# Patient Record
Sex: MALE | Race: WHITE | NOT HISPANIC OR LATINO | Employment: FULL TIME | URBAN - NONMETROPOLITAN AREA
[De-identification: names, ages, dates, MRNs, and addresses within clinical notes are randomized per-mention and may not be internally consistent; named-entity substitution may affect disease eponyms.]

---

## 2017-01-03 ENCOUNTER — OFFICE VISIT (OUTPATIENT)
Dept: INTERNAL MEDICINE | Facility: CLINIC | Age: 27
End: 2017-01-03

## 2017-01-03 VITALS
SYSTOLIC BLOOD PRESSURE: 122 MMHG | RESPIRATION RATE: 12 BRPM | HEIGHT: 66 IN | WEIGHT: 175 LBS | DIASTOLIC BLOOD PRESSURE: 80 MMHG | HEART RATE: 94 BPM | OXYGEN SATURATION: 97 % | BODY MASS INDEX: 28.12 KG/M2

## 2017-01-03 DIAGNOSIS — N45.1 EPIDIDYMITIS, RIGHT: ICD-10-CM

## 2017-01-03 DIAGNOSIS — F98.8 ATTENTION DEFICIT DISORDER: ICD-10-CM

## 2017-01-03 DIAGNOSIS — N50.819 EPIDIDYMAL PAIN: Primary | ICD-10-CM

## 2017-01-03 PROCEDURE — 99214 OFFICE O/P EST MOD 30 MIN: CPT | Performed by: FAMILY MEDICINE

## 2017-01-03 NOTE — MR AVS SNAPSHOT
Colin CISNEROS Bryon   1/3/2017 4:15 PM   Office Visit    Provider:  Lauren Gold MD   Department:  [facility] PRIMARY CARE   Dept Phone:  [phone]                Your Full Care Plan              Today's Medication Changes          These changes are accurate as of: 1/3/17  5:43 PM.  If you have any questions, ask your nurse or doctor.               Medication(s)that have changed:     lisdexamfetamine 40 MG capsule   Commonly known as:  VYVANSE   Take 1 capsule by mouth Every Morning.   What changed:    - medication strength  - how much to take   Changed by:  Lauren Gold MD            Where to Get Your Medications      You can get these medications from any pharmacy     Bring a paper prescription for each of these medications     lisdexamfetamine 40 MG capsule                  Your Updated Medication List          This list is accurate as of: 1/3/17  5:43 PM.  Always use your most recent med list.                lisdexamfetamine 40 MG capsule   Commonly known as:  VYVANSE   Take 1 capsule by mouth Every Morning.               We Performed the Following     Ambulatory Referral to Urology       You Were Diagnosed With        Codes Comments    Epididymal pain    -  Primary ICD-10-CM: N50.819  ICD-9-CM: 608.9     Epididymitis, right     ICD-10-CM: N45.1  ICD-9-CM: 604.90     Attention deficit disorder     ICD-10-CM: F98.8  ICD-9-CM: 314.00       Instructions     None    Patient Instructions History      [employer] Signup     Our records indicate that you have an active Intrinsity account.    You can view your After Visit Summary by going to KnowFu and logging in with your [employer] username and password.  If you don't have a [employer] username and password but a parent or guardian has access to your record, the parent or guardian should login with their own [employer] username and password and access your record to view the After Visit Summary.    If you  "have questions, you can email [email or web address] or call [phone] to talk to our MyChart staff.  Remember, [employer] is NOT to be used for urgent needs.  For medical emergencies, dial 911.               Other Info from Your Visit           Allergies     No Known Allergies      Reason for Visit     Med Management     epididymitis           Vital Signs     Blood Pressure Pulse Respirations Height Weight Oxygen Saturation    122/80 94 12 66\" (167.6 cm) 175 lb (79.4 kg) 97%    Body Mass Index Smoking Status                28.25 kg/m2 Never Smoker          Problems and Diagnoses Noted     ADD (attention deficit disorder)    Disorder of male genital organs    -  Primary    Epididymitis, right          "

## 2017-01-03 NOTE — PROGRESS NOTES
SUBJECTIVE: Colin Slater is a 26 y.o. male seen for a follow up visit;        Follow up visit. Went to [facility] in the summer for epididymitis. Took antibx, it got better, flared up again about 2 weeks after finishing antibx. 2nd episode was not as bad as the first, took another round of antibx, resolved. Since then, occas will have soreness of right testicle that lasts a few days, no swelling. Will take ibuprofen and it helps. He used to skateboard, had multiple injuries to the groin. Exercise makes the soreness better. He does not have any masses or lumps, just feels uncomfortable, doesn't feel right.     ADD: Discuss lowering Vyvanse dosage. Thinks the tension headaches he had been describing may be motion sickness, not room spinning dizziness - more vertigo feeling.  The medicine is working for the most part but he is having some trouble sleeping - has weird work schedule that sometimes interferes with his medication dose and his sleep.     The following portions of the patient's history were reviewed and updated as appropriate: He  has a past medical history of Asthma.  He has Attention deficit disorder on his pertinent problem list.  He  has a past surgical history that includes Myringotomy w/ tubes; Septoplasty; and [place] tooth extraction.  His family history includes Cancer in his paternal grandmother; Diabetes in his father; Early death in his maternal grandfather; Heart disease in his father and paternal grandmother; Hyperlipidemia in his father and paternal grandmother; Hypertension in his father; Lung cancer in his paternal grandmother; No Known Problems in his paternal grandfather; Thyroid disease in his sister.  He  reports that he has never smoked. He has never used smokeless tobacco. He reports that he drinks about 7.2 oz of alcohol per week  He reports that he does not use illicit drugs..    Review of Systems   Constitutional: Negative.    Genitourinary: Positive for testicular  "pain. Negative for decreased urine volume, discharge, genital sores, hematuria, penile pain, penile swelling, scrotal swelling and urgency.         OBJECTIVE:  Visit Vitals   • /80   • Pulse 94   • Resp 12   • Ht 66\" (167.6 cm)   • Wt 175 lb (79.4 kg)   • SpO2 97%   • BMI 28.25 kg/m2        Physical Exam   Constitutional: He appears well-developed and well-nourished. No distress.   Psychiatric: His speech is normal and behavior is normal. Thought content normal. His mood appears anxious.           ASSESSMENT:   Diagnosis Plan   1. Epididymal pain  Ambulatory Referral to Urology   2. Epididymitis, right  Ambulatory Referral to Urology   3. Attention deficit disorder  lisdexamfetamine (VYVANSE) 40 MG capsule                 "

## 2017-01-12 ENCOUNTER — TELEPHONE (OUTPATIENT)
Dept: INTERNAL MEDICINE | Facility: CLINIC | Age: 27
End: 2017-01-12

## 2017-01-12 NOTE — TELEPHONE ENCOUNTER
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 States that if possible, he would need this by 1pm tomorrow. Patient would like to pick this up, if approved. Any other questions or concerns, please contact patient. Thank you.

## 2017-01-12 NOTE — TELEPHONE ENCOUNTER
Spoke with pt. He just needs a short letter as supporting documentation that he is starting new meds, and there may be dose changes until a stable dose is reached. Because of new med and dosage changes, it could affect him academically. He would like a call back when letter is ready.     He is also working with vocational rehab at [facility], they need copies of office notes to all the visits relating to ADD and meds. I advised him to talk to the  about getting those records. He will do this.

## 2017-01-20 ENCOUNTER — OFFICE VISIT (OUTPATIENT)
Dept: UROLOGY | Facility: CLINIC | Age: 27
End: 2017-01-20

## 2017-01-20 VITALS
OXYGEN SATURATION: 99 % | WEIGHT: 175 LBS | BODY MASS INDEX: 28.12 KG/M2 | TEMPERATURE: 97.9 F | HEART RATE: 95 BPM | HEIGHT: 66 IN | DIASTOLIC BLOOD PRESSURE: 80 MMHG | SYSTOLIC BLOOD PRESSURE: 125 MMHG

## 2017-01-20 DIAGNOSIS — N45.1 EPIDIDYMITIS: Primary | ICD-10-CM

## 2017-01-20 LAB
BILIRUB BLD-MCNC: NEGATIVE MG/DL
CLARITY, POC: CLEAR
COLOR UR: YELLOW
GLUCOSE UR STRIP-MCNC: NEGATIVE MG/DL
KETONES UR QL: NEGATIVE
LEUKOCYTE EST, POC: NEGATIVE
NITRITE UR-MCNC: NEGATIVE MG/ML
PH UR: 6 [PH] (ref 5–8)
PROT UR STRIP-MCNC: ABNORMAL MG/DL
RBC # UR STRIP: NEGATIVE /UL
SP GR UR: 1.02 (ref 1–1.03)
UROBILINOGEN UR QL: NORMAL

## 2017-01-20 PROCEDURE — 81003 URINALYSIS AUTO W/O SCOPE: CPT | Performed by: UROLOGY

## 2017-01-20 PROCEDURE — 99204 OFFICE O/P NEW MOD 45 MIN: CPT | Performed by: UROLOGY

## 2017-01-20 NOTE — PROGRESS NOTES
Chief Complaint  Consult ( SENT PATIENT TO BE SEEN FOR EPIDIDYMITITS.)        MAI Slater is a 26 y.o. male who is referred for evaluation of his testicles.  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  He was also treated in July for epididymitis thought secondary to a sexually transmitted disease since he had had unprotected intercourse and a slight urethral discharge.  He is in the [employer] and states they have checked him for STDs subsequently.  Early he continues to have a minor nagging pain in the right testicle that he is concerned about.  He has numerous other questions such as side effects on his libido and erectile function from medicine being used to treat his attention deficit disorder.  He is concerned about his fertility as well as the possibility of testicular torsion.  He's informed about the possibility of sterile reflux epididymitis from sudden increase in his abdominal pressure associated with a full bladder.  He denies any voiding difficulties and describes an AUA symptom index today of only 4.    Vitals:    01/20/17 1428   BP: 125/80   Pulse: 95   Temp: 97.9 °F (36.6 °C)   SpO2: 99%       Past Medical History  Past Medical History   Diagnosis Date   • Asthma        Past Surgical History  Past Surgical History   Procedure Laterality Date   • Myringotomy w/ tubes     • Septoplasty     • [place] tooth extraction         Medications  has a current medication list which includes the following prescription(s): lisdexamfetamine.      Allergies  No Known Allergies    Social History  Social History     Social History Narrative       Family History  He has no family history of bladder or kidney cancer  He has no family history of kidney stones      AUA Symptom Score:      Review of Systems  Review of Systems  Positive for fatigue and dizziness appetite changes but neg in other categories.  Physical Exam  Physical Exam   Constitutional: He is  oriented to person, place, and time. He appears well-developed and well-nourished.   HENT:   Head: Normocephalic and atraumatic.   Neck: Normal range of motion.   Pulmonary/Chest: Effort normal. No respiratory distress.   Abdominal: Soft. He exhibits no mass. There is no tenderness. No hernia.   Genitourinary: Rectum normal, prostate normal and penis normal.   Musculoskeletal: Normal range of motion.   Neurological: He is alert and oriented to person, place, and time.   Skin: Skin is warm and dry.   Psychiatric: He has a normal mood and affect. His behavior is normal.   Vitals reviewed.      Labs Recent and today in the office:  Results for orders placed or performed in visit on 01/20/17   POC Urinalysis Dipstick, Automated   Result Value Ref Range    Color Yellow Yellow, Straw, Dark Yellow, Dinah    Clarity, UA Clear Clear    Glucose, UA Negative Negative, 1000 mg/dL (3+) mg/dL    Bilirubin Negative Negative    Ketones, UA Negative Negative    Specific Gravity  1.025 1.005 - 1.030    Blood, UA Negative Negative    pH, Urine 6.0 5.0 - 8.0    Protein, POC 1+ (A) Negative mg/dL    Urobilinogen, UA Normal Normal    Leukocytes Negative Negative    Nitrite, UA Negative Negative         Assessment & Plan  His urine today is clear and negative for both white cells and blood.  His physical exam is normal including no masses or tenderness in the Testicles or epididymis on either side.  The globus minor of the right epididymis is identified as the source of his discomfort but it is objectively normal today.    A vigorous massage of the prostate produces no prosthetic secretions that I had hoped to examine under the microscope.    In short there is no evidence of pathology today but I have recommended a scrotal ultrasound for further evaluation.  Anytime he has a swelling or other pathology that are objectively present of recommend he return immediately for repeat exam and it 6 weeks regardless.  He's instructed in ways to  avoid sterile reflux epididymitis and obviously needs to avoid unprotected intercourse with new sexual contacts.  He is concerned about infertility and is informed there is a slightly increased risk with bouts of epididymitis.    I note he is on Vyvanse which should be used with caution with patient's with a history of substance abuse.

## 2017-01-20 NOTE — MR AVS SNAPSHOT
Colin CISNEROS Bryon   1/20/2017 2:30 PM   Office Visit    Dept Phone:  [phone]   Encounter #:  [id number]    Provider:  Jasiel Husain MD   Department:  [facility] UROLOGY                Your Full Care Plan              Your Updated Medication List          This list is accurate as of: 1/20/17  2:49 PM.  Always use your most recent med list.                lisdexamfetamine 40 MG capsule   Commonly known as:  VYVANSE   Take 1 capsule by mouth Every Morning.               We Performed the Following     POC Urinalysis Dipstick, Automated       You Were Diagnosed With        Codes Comments    Epididymitis    -  Primary ICD-10-CM: N45.1  ICD-9-CM: 604.90       Instructions     None    Patient Instructions History      Upcoming Appointments     Visit Type Date Time Department    NEW PATIENT 1/20/2017  2:30 PM [facility] UROLOGY [place]    FOLLOW UP 3/6/2017  3:15 PM E UROLOGY [place]    FOLLOW UP 4/3/2017  3:45 PM E [facility]      WigWag Signup     Our records indicate that you have an active [employer] account.    You can view your After Visit Summary by going to [employer] and logging in with your [employer] username and password.  If you don't have a W[employer] username and password but a parent or guardian has access to your record, the parent or guardian should login with their own W[employer] username and password and access your record to view the After Visit Summary.    If you have questions, you can email [email or web address] or call [phone] to talk to our WigW[employer] staff.  Remember, W[employer] is NOT to be used for urgent needs.  For medical emergencies, dial 911.               Other Info from Your Visit           Your Appointments     Mar 06, 2017  3:15 PM EST   Follow Up with Jasiel Husain MD   [facility] UROLOGY (--)    [address] [address] [phone]           Arrive 15 minutes  "prior to appointment.            Apr 03, 2017  3:45 PM EDT   Follow Up with Lauren Gold MD   [facility] PRIMARY CARE (--)    [address]  [facility] [address] [phone]           Arrive 15 minutes prior to appointment.              Allergies     No Known Allergies      Reason for Visit     Consult  SENT PATIENT TO BE SEEN FOR EPIDIDYMITITS.      Vital Signs     Blood Pressure Pulse Temperature Height Weight Oxygen Saturation    125/80 95 97.9 °F (36.6 °C) (Temporal Artery ) 66\" (167.6 cm) 175 lb (79.4 kg) 99%    Body Mass Index Smoking Status                28.25 kg/m2 Never Smoker          Problems and Diagnoses Noted     Epididymitis    -  Primary      Results     POC Urinalysis Dipstick, Automated      Component Value Standard Range & Units    Color Yellow Yellow, Straw, Dark Yellow, Dinah    Clarity, UA Clear Clear    Glucose, UA Negative Negative, 1000 mg/dL (3+) mg/dL    Bilirubin Negative Negative    Ketones, UA Negative Negative    Specific Gravity  1.025 1.005 - 1.030    Blood, UA Negative Negative    pH, Urine 6.0 5.0 - 8.0    Protein, POC 1+ Negative mg/dL    Urobilinogen, UA Normal Normal    Leukocytes Negative Negative    Nitrite, UA Negative Negative                    "

## 2017-01-25 ENCOUNTER — HOSPITAL ENCOUNTER (OUTPATIENT)
Dept: ULTRASOUND IMAGING | Facility: HOSPITAL | Age: 27
Discharge: HOME | End: 2017-01-25
Attending: UROLOGY

## 2017-01-27 ENCOUNTER — HOSPITAL ENCOUNTER (OUTPATIENT)
Dept: ULTRASOUND IMAGING | Facility: HOSPITAL | Age: 27
Discharge: HOME | End: 2017-01-27
Attending: UROLOGY | Admitting: UROLOGY

## 2017-01-27 PROCEDURE — 76870 US EXAM SCROTUM: CPT

## 2017-02-03 DIAGNOSIS — F98.8 ATTENTION DEFICIT DISORDER: ICD-10-CM

## 2017-02-03 NOTE — TELEPHONE ENCOUNTER
----- Message from Tanya Edge sent at 2/3/2017  9:24 AM EST -----  THE PATIENT NEEDS A REFILL OF VYVANSE. HE CAN BE REACHED [phone].

## 2017-03-09 DIAGNOSIS — F98.8 ATTENTION DEFICIT DISORDER: ICD-10-CM

## 2017-03-27 ENCOUNTER — OFFICE VISIT (OUTPATIENT)
Dept: INTERNAL MEDICINE | Facility: CLINIC | Age: 27
End: 2017-03-27

## 2017-03-27 VITALS
DIASTOLIC BLOOD PRESSURE: 80 MMHG | BODY MASS INDEX: 30.37 KG/M2 | OXYGEN SATURATION: 98 % | SYSTOLIC BLOOD PRESSURE: 132 MMHG | HEART RATE: 91 BPM | HEIGHT: 66 IN | WEIGHT: 189 LBS | RESPIRATION RATE: 12 BRPM

## 2017-03-27 DIAGNOSIS — F32.1 MODERATE SINGLE CURRENT EPISODE OF MAJOR DEPRESSIVE DISORDER (HCC): ICD-10-CM

## 2017-03-27 DIAGNOSIS — F98.8 ATTENTION DEFICIT DISORDER: Primary | ICD-10-CM

## 2017-03-27 PROCEDURE — 99214 OFFICE O/P EST MOD 30 MIN: CPT | Performed by: FAMILY MEDICINE

## 2017-03-27 RX ORDER — DEXTROAMPHETAMINE SACCHARATE, AMPHETAMINE ASPARTATE MONOHYDRATE, DEXTROAMPHETAMINE SULFATE AND AMPHETAMINE SULFATE 6.25; 6.25; 6.25; 6.25 MG/1; MG/1; MG/1; MG/1
25 CAPSULE, EXTENDED RELEASE ORAL EVERY MORNING
Qty: 30 CAPSULE | Refills: 0 | Status: SHIPPED | OUTPATIENT
Start: 2017-03-27 | End: 2017-03-27 | Stop reason: SDUPTHER

## 2017-03-27 RX ORDER — DEXTROAMPHETAMINE SACCHARATE, AMPHETAMINE ASPARTATE MONOHYDRATE, DEXTROAMPHETAMINE SULFATE AND AMPHETAMINE SULFATE 6.25; 6.25; 6.25; 6.25 MG/1; MG/1; MG/1; MG/1
25 CAPSULE, EXTENDED RELEASE ORAL EVERY MORNING
Qty: 30 CAPSULE | Refills: 0 | Status: SHIPPED | OUTPATIENT
Start: 2017-03-27 | End: 2017-04-03 | Stop reason: SDUPTHER

## 2017-03-27 RX ORDER — ESCITALOPRAM OXALATE 5 MG/1
5 TABLET ORAL DAILY
Qty: 30 TABLET | Refills: 1 | Status: SHIPPED | OUTPATIENT
Start: 2017-03-27 | End: 2017-04-13

## 2017-03-31 ENCOUNTER — TELEPHONE (OUTPATIENT)
Dept: INTERNAL MEDICINE | Facility: CLINIC | Age: 27
End: 2017-03-31

## 2017-03-31 DIAGNOSIS — F98.8 ATTENTION DEFICIT DISORDER: ICD-10-CM

## 2017-03-31 NOTE — TELEPHONE ENCOUNTER
Patient states he thinks the Adderall is working fine but is causing him to be a little jittery. Would like to know if the dose could be lowered a little or if this is something that could be lowered quite a bit and taken twice a day.

## 2017-03-31 NOTE — TELEPHONE ENCOUNTER
----- Message from Magdalena Rodriguez sent at 3/31/2017  1:28 PM EDT -----  PLEASE CALL PATIENT HE HAS SOME ISSUES WITH HIS MEDICATION AND WOULD LIKE TO DECREASE THE DOSE.     [phone]

## 2017-04-03 RX ORDER — DEXTROAMPHETAMINE SACCHARATE, AMPHETAMINE ASPARTATE MONOHYDRATE, DEXTROAMPHETAMINE SULFATE AND AMPHETAMINE SULFATE 3.75; 3.75; 3.75; 3.75 MG/1; MG/1; MG/1; MG/1
14 CAPSULE, EXTENDED RELEASE ORAL EVERY MORNING
Qty: 14 CAPSULE | Refills: 0 | Status: SHIPPED | OUTPATIENT
Start: 2017-04-03 | End: 2017-04-24 | Stop reason: DRUGHIGH

## 2017-04-03 RX ORDER — DEXTROAMPHETAMINE SACCHARATE, AMPHETAMINE ASPARTATE MONOHYDRATE, DEXTROAMPHETAMINE SULFATE AND AMPHETAMINE SULFATE 5; 5; 5; 5 MG/1; MG/1; MG/1; MG/1
20 CAPSULE, EXTENDED RELEASE ORAL EVERY MORNING
Qty: 14 CAPSULE | Refills: 0 | Status: SHIPPED | OUTPATIENT
Start: 2017-04-03 | End: 2017-04-03 | Stop reason: SDUPTHER

## 2017-04-12 ENCOUNTER — OFFICE VISIT (OUTPATIENT)
Dept: INTERNAL MEDICINE | Facility: CLINIC | Age: 27
End: 2017-04-12

## 2017-04-12 VITALS
TEMPERATURE: 98.1 F | BODY MASS INDEX: 29.83 KG/M2 | HEIGHT: 66 IN | OXYGEN SATURATION: 97 % | SYSTOLIC BLOOD PRESSURE: 134 MMHG | HEART RATE: 77 BPM | DIASTOLIC BLOOD PRESSURE: 82 MMHG | WEIGHT: 185.6 LBS

## 2017-04-12 DIAGNOSIS — F98.8 ATTENTION DEFICIT DISORDER: ICD-10-CM

## 2017-04-12 PROCEDURE — 99213 OFFICE O/P EST LOW 20 MIN: CPT | Performed by: FAMILY MEDICINE

## 2017-04-12 RX ORDER — VENLAFAXINE HYDROCHLORIDE 37.5 MG/1
37.5 CAPSULE, EXTENDED RELEASE ORAL DAILY
Qty: 30 CAPSULE | Refills: 0 | Status: SHIPPED | OUTPATIENT
Start: 2017-04-12 | End: 2017-04-24 | Stop reason: SDUPTHER

## 2017-04-12 RX ORDER — DEXTROAMPHETAMINE SACCHARATE, AMPHETAMINE ASPARTATE MONOHYDRATE, DEXTROAMPHETAMINE SULFATE AND AMPHETAMINE SULFATE 5; 5; 5; 5 MG/1; MG/1; MG/1; MG/1
20 CAPSULE, EXTENDED RELEASE ORAL EVERY MORNING
Qty: 14 CAPSULE | Refills: 0 | Status: SHIPPED | OUTPATIENT
Start: 2017-04-12 | End: 2017-04-24 | Stop reason: SDUPTHER

## 2017-04-13 NOTE — PROGRESS NOTES
Subjective   Colin Slater is a 27 y.o. male.     History of Present Illness     Patient normally sees Dr. Gold for his anxiety and ADHD.  They have been working together on medicines for ADHD.  Recently his Adderall XR dose was decreased from 25 mg to 15 mg which he thinks this may been too big of a drop.  Having more fatigue.  Mood is also still down, does not have interest in doing activities.  Anhedonia.  No suicidal thoughts.  Has been on Lexapro for about 3 weeks, has not been on other medications.  For his ADHD, he has failed Vyvanse and Ritalin.  He has been researching medicines for depression and anxiety and he asked if a norepinephrine modulator would be an option.      Review of Systems   Constitutional: Positive for fatigue.   Cardiovascular: Negative for chest pain.   Psychiatric/Behavioral: Positive for decreased concentration and sleep disturbance. Negative for suicidal ideas. The patient is nervous/anxious.        Objective   Physical Exam   Constitutional: He is oriented to person, place, and time. Vital signs are normal. He appears well-developed and well-nourished. He is active. He does not appear ill.   HENT:   Head: Normocephalic and atraumatic.   Right Ear: Hearing normal.   Left Ear: Hearing normal.   Nose: Nose normal.   Eyes: EOM and lids are normal. Pupils are equal, round, and reactive to light.   Cardiovascular: Normal rate, regular rhythm and normal heart sounds.    Pulmonary/Chest: Effort normal and breath sounds normal.   Neurological: He is alert and oriented to person, place, and time. No cranial nerve deficit. Gait normal.   Skin: Skin is warm. He is not diaphoretic. No cyanosis. Nails show no clubbing.   Psychiatric: He has a normal mood and affect. His behavior is normal. Judgment and thought content normal. His speech is not delayed and not tangential.   Pressured speech but not rapid He is attentive.   Nursing note and vitals reviewed.      Assessment/Plan   Colin was seen  today for adhd and depression.    Diagnoses and all orders for this visit:    Attention deficit disorder  -     amphetamine-dextroamphetamine XR (ADDERALL XR) 20 MG 24 hr capsule; Take 1 capsule by mouth Every Morning.    Other orders  -     venlafaxine XR (EFFEXOR XR) 37.5 MG 24 hr capsule; Take 1 capsule by mouth Daily.     stop Lexapro, start Effexor.  I agree to increase his Adderall dose back up a bit.  He will follow-up with his primary care provider within 2 weeks to discuss how he's doing.  Stop Effexor for any thoughts of suicide or homicide, call office.

## 2017-04-24 ENCOUNTER — OFFICE VISIT (OUTPATIENT)
Dept: INTERNAL MEDICINE | Facility: CLINIC | Age: 27
End: 2017-04-24

## 2017-04-24 VITALS
DIASTOLIC BLOOD PRESSURE: 80 MMHG | HEIGHT: 66 IN | RESPIRATION RATE: 12 BRPM | HEART RATE: 80 BPM | SYSTOLIC BLOOD PRESSURE: 122 MMHG | WEIGHT: 186 LBS | BODY MASS INDEX: 29.89 KG/M2

## 2017-04-24 DIAGNOSIS — F98.8 ATTENTION DEFICIT DISORDER: ICD-10-CM

## 2017-04-24 DIAGNOSIS — F32.1 MODERATE SINGLE CURRENT EPISODE OF MAJOR DEPRESSIVE DISORDER (HCC): Primary | ICD-10-CM

## 2017-04-24 PROCEDURE — 99213 OFFICE O/P EST LOW 20 MIN: CPT | Performed by: FAMILY MEDICINE

## 2017-04-24 RX ORDER — VENLAFAXINE HYDROCHLORIDE 75 MG/1
75 CAPSULE, EXTENDED RELEASE ORAL DAILY
Qty: 30 CAPSULE | Refills: 1 | Status: SHIPPED | OUTPATIENT
Start: 2017-04-24 | End: 2017-05-25 | Stop reason: SDUPTHER

## 2017-04-24 RX ORDER — DEXTROAMPHETAMINE SACCHARATE, AMPHETAMINE ASPARTATE MONOHYDRATE, DEXTROAMPHETAMINE SULFATE AND AMPHETAMINE SULFATE 5; 5; 5; 5 MG/1; MG/1; MG/1; MG/1
20 CAPSULE, EXTENDED RELEASE ORAL EVERY MORNING
Qty: 30 CAPSULE | Refills: 0 | Status: SHIPPED | OUTPATIENT
Start: 2017-04-24 | End: 2017-05-26 | Stop reason: SDUPTHER

## 2017-05-19 ENCOUNTER — TELEPHONE (OUTPATIENT)
Dept: INTERNAL MEDICINE | Facility: CLINIC | Age: 27
End: 2017-05-19

## 2017-05-24 DIAGNOSIS — F32.1 MODERATE SINGLE CURRENT EPISODE OF MAJOR DEPRESSIVE DISORDER (HCC): ICD-10-CM

## 2017-05-24 DIAGNOSIS — F98.8 ATTENTION DEFICIT DISORDER: ICD-10-CM

## 2017-05-24 RX ORDER — DEXTROAMPHETAMINE SACCHARATE, AMPHETAMINE ASPARTATE MONOHYDRATE, DEXTROAMPHETAMINE SULFATE AND AMPHETAMINE SULFATE 5; 5; 5; 5 MG/1; MG/1; MG/1; MG/1
20 CAPSULE, EXTENDED RELEASE ORAL EVERY MORNING
Qty: 30 CAPSULE | Refills: 0 | Status: CANCELLED | OUTPATIENT
Start: 2017-05-24

## 2017-05-25 ENCOUNTER — TELEPHONE (OUTPATIENT)
Dept: INTERNAL MEDICINE | Facility: CLINIC | Age: 27
End: 2017-05-25

## 2017-05-25 DIAGNOSIS — F32.1 MODERATE SINGLE CURRENT EPISODE OF MAJOR DEPRESSIVE DISORDER (HCC): ICD-10-CM

## 2017-05-25 RX ORDER — VENLAFAXINE HYDROCHLORIDE 75 MG/1
75 CAPSULE, EXTENDED RELEASE ORAL DAILY
Qty: 90 CAPSULE | Refills: 1 | Status: SHIPPED | OUTPATIENT
Start: 2017-05-25 | End: 2019-02-22

## 2017-05-26 DIAGNOSIS — F98.8 ATTENTION DEFICIT DISORDER: ICD-10-CM

## 2017-05-26 RX ORDER — DEXTROAMPHETAMINE SACCHARATE, AMPHETAMINE ASPARTATE MONOHYDRATE, DEXTROAMPHETAMINE SULFATE AND AMPHETAMINE SULFATE 5; 5; 5; 5 MG/1; MG/1; MG/1; MG/1
20 CAPSULE, EXTENDED RELEASE ORAL EVERY MORNING
Qty: 30 CAPSULE | Refills: 0 | Status: SHIPPED | OUTPATIENT
Start: 2017-05-26 | End: 2017-07-03 | Stop reason: SDUPTHER

## 2017-05-30 DIAGNOSIS — F98.8 ATTENTION DEFICIT DISORDER: ICD-10-CM

## 2017-05-30 RX ORDER — VENLAFAXINE HYDROCHLORIDE 75 MG/1
75 CAPSULE, EXTENDED RELEASE ORAL DAILY
Qty: 30 CAPSULE | Refills: 1 | OUTPATIENT
Start: 2017-05-30

## 2017-06-01 NOTE — TELEPHONE ENCOUNTER
Spoke with pt about Adderall. He has not filled the 20 mg XR that was written last week. He is requesting 25 mg XR because he has discovered when he takes the 25 mg along with the Venlafaxine, he can achieve orgasm. He will bring in 20 mg XR and give it to us.

## 2017-06-02 ENCOUNTER — TELEPHONE (OUTPATIENT)
Dept: INTERNAL MEDICINE | Facility: CLINIC | Age: 27
End: 2017-06-02

## 2017-06-02 DIAGNOSIS — F32.1 MODERATE SINGLE CURRENT EPISODE OF MAJOR DEPRESSIVE DISORDER (HCC): Primary | ICD-10-CM

## 2017-06-02 DIAGNOSIS — F98.8 ATTENTION DEFICIT DISORDER: ICD-10-CM

## 2017-06-02 NOTE — TELEPHONE ENCOUNTER
Patient called today regarding message he sent in my chart on 5/30, it would not let me addend the message. He would like a call back when possible.     He also went on to state that he apologized for being difficult, he knows that he can be hard to deal with when there is change involved. But that he has had consistent communication problems and if this persisted, he may look into changing to another office.

## 2017-06-02 NOTE — TELEPHONE ENCOUNTER
"On 5/19 patient requested a greater than 30 day prescription of his Schedule II medication adderal XR 20 mg.  He was informed via telephone by my MA that I had refused this request due to controlled prescription state law.  When he came to the office to  the prescription on 5/26 he refused to leave because his request was not agreed to.  During my 3:15 new patient office visit I was interrupted 4 different times due to his various demands and continued refusal to leave, despite my MA having already explained to him prior to his coming that I would not be making a \"special permission\" for more than 30 days of his controlled medication.  The  had to intercede because he would not accept my refusal.  He finally accepted the 30 day prescription and left.      Two days ago he sent a message stating that he had taken some leftover 25 mg dosage of the same medication and that because of a side effect of one medication and this pills ability to overcome that side effect (my phrasing - not his) he wanted to switch back to this past dose.  The past dose was changed at his request in April because it was making him \"too jittery\".     In all, his ADD medications have required medication dose adjustment or medication switches nine times in the past year, this would be the tenth.      I wrote Mr. Slater a letter that he will be referred to psychiatry for his depression and ADD as I do not think I am successfully treating his conditions.  I advised him I will continue to prescribe his current medications and doses with no further adjustments.    "

## 2017-06-05 RX ORDER — DEXTROAMPHETAMINE SACCHARATE, AMPHETAMINE ASPARTATE MONOHYDRATE, DEXTROAMPHETAMINE SULFATE AND AMPHETAMINE SULFATE 6.25; 6.25; 6.25; 6.25 MG/1; MG/1; MG/1; MG/1
25 CAPSULE, EXTENDED RELEASE ORAL EVERY MORNING
Qty: 30 CAPSULE | Refills: 0 | OUTPATIENT
Start: 2017-06-05

## 2017-06-05 NOTE — TELEPHONE ENCOUNTER
Advised that this is actually a concerning side effect of a dose that is too high, cannot increase dose.

## 2017-07-03 ENCOUNTER — TELEPHONE (OUTPATIENT)
Dept: INTERNAL MEDICINE | Facility: CLINIC | Age: 27
End: 2017-07-03

## 2017-07-03 DIAGNOSIS — F98.8 ATTENTION DEFICIT DISORDER: ICD-10-CM

## 2017-07-03 RX ORDER — DEXTROAMPHETAMINE SACCHARATE, AMPHETAMINE ASPARTATE MONOHYDRATE, DEXTROAMPHETAMINE SULFATE AND AMPHETAMINE SULFATE 5; 5; 5; 5 MG/1; MG/1; MG/1; MG/1
20 CAPSULE, EXTENDED RELEASE ORAL EVERY MORNING
Qty: 30 CAPSULE | Refills: 0 | Status: SHIPPED | OUTPATIENT
Start: 2017-07-03 | End: 2020-02-24

## 2017-07-05 ENCOUNTER — TELEPHONE (OUTPATIENT)
Dept: INTERNAL MEDICINE | Facility: CLINIC | Age: 27
End: 2017-07-05

## 2017-07-05 DIAGNOSIS — F98.8 ATTENTION DEFICIT DISORDER: ICD-10-CM

## 2017-07-05 RX ORDER — DEXTROAMPHETAMINE SACCHARATE, AMPHETAMINE ASPARTATE MONOHYDRATE, DEXTROAMPHETAMINE SULFATE AND AMPHETAMINE SULFATE 5; 5; 5; 5 MG/1; MG/1; MG/1; MG/1
20 CAPSULE, EXTENDED RELEASE ORAL EVERY MORNING
Qty: 30 CAPSULE | Refills: 0 | Status: CANCELLED | OUTPATIENT
Start: 2017-07-05

## 2017-07-05 NOTE — TELEPHONE ENCOUNTER
Pt called Monday 7-3-17 for refill. Rx was signed this morning by Dr. Gold. 3Jam message sent to pt this morning letting him know rx is ready to be picked up.

## 2018-01-16 ENCOUNTER — APPOINTMENT (OUTPATIENT)
Dept: ULTRASOUND IMAGING | Facility: HOSPITAL | Age: 28
End: 2018-01-16

## 2018-01-16 ENCOUNTER — HOSPITAL ENCOUNTER (EMERGENCY)
Facility: HOSPITAL | Age: 28
Discharge: HOME | End: 2018-01-16
Attending: EMERGENCY MEDICINE | Admitting: EMERGENCY MEDICINE

## 2018-01-16 VITALS
BODY MASS INDEX: 28.25 KG/M2 | RESPIRATION RATE: 20 BRPM | TEMPERATURE: 98.3 F | DIASTOLIC BLOOD PRESSURE: 91 MMHG | SYSTOLIC BLOOD PRESSURE: 141 MMHG | OXYGEN SATURATION: 98 % | HEART RATE: 88 BPM | WEIGHT: 180 LBS | HEIGHT: 67 IN

## 2018-01-16 DIAGNOSIS — N45.1 EPIDIDYMITIS, LEFT: Primary | ICD-10-CM

## 2018-01-16 LAB
BACTERIA UR QL AUTO: ABNORMAL /HPF
BILIRUB UR QL STRIP: NEGATIVE
CLARITY UR: ABNORMAL
COLOR UR: YELLOW
GLUCOSE UR STRIP-MCNC: NEGATIVE MG/DL
HGB UR QL STRIP.AUTO: ABNORMAL
HYALINE CASTS UR QL AUTO: ABNORMAL /LPF
KETONES UR QL STRIP: NEGATIVE
LEUKOCYTE ESTERASE UR QL STRIP.AUTO: ABNORMAL
NITRITE UR QL STRIP: NEGATIVE
PH UR STRIP.AUTO: 6 [PH] (ref 5–8)
PROT UR QL STRIP: ABNORMAL
RBC # UR: ABNORMAL /HPF
REF LAB TEST METHOD: ABNORMAL
SP GR UR STRIP: 1.02 (ref 1–1.03)
SQUAMOUS #/AREA URNS HPF: ABNORMAL /HPF
UROBILINOGEN UR QL STRIP: ABNORMAL
WBC UR QL AUTO: ABNORMAL /HPF

## 2018-01-16 PROCEDURE — 87086 URINE CULTURE/COLONY COUNT: CPT | Performed by: PHYSICIAN ASSISTANT

## 2018-01-16 PROCEDURE — 99283 EMERGENCY DEPT VISIT LOW MDM: CPT

## 2018-01-16 PROCEDURE — 96372 THER/PROPH/DIAG INJ SC/IM: CPT

## 2018-01-16 PROCEDURE — 76870 US EXAM SCROTUM: CPT

## 2018-01-16 PROCEDURE — 25010000002 CEFTRIAXONE PER 250 MG: Performed by: PHYSICIAN ASSISTANT

## 2018-01-16 PROCEDURE — 87591 N.GONORRHOEAE DNA AMP PROB: CPT | Performed by: PHYSICIAN ASSISTANT

## 2018-01-16 PROCEDURE — 81001 URINALYSIS AUTO W/SCOPE: CPT | Performed by: PHYSICIAN ASSISTANT

## 2018-01-16 PROCEDURE — 87491 CHLMYD TRACH DNA AMP PROBE: CPT | Performed by: PHYSICIAN ASSISTANT

## 2018-01-16 RX ORDER — DOXYCYCLINE HYCLATE 50 MG/1
100 CAPSULE ORAL ONCE
Status: DISCONTINUED | OUTPATIENT
Start: 2018-01-16 | End: 2018-01-16

## 2018-01-16 RX ORDER — LIDOCAINE HYDROCHLORIDE 10 MG/ML
2.1 INJECTION, SOLUTION EPIDURAL; INFILTRATION; INTRACAUDAL; PERINEURAL ONCE
Status: COMPLETED | OUTPATIENT
Start: 2018-01-16 | End: 2018-01-16

## 2018-01-16 RX ORDER — DOXYCYCLINE 100 MG/1
100 CAPSULE ORAL 2 TIMES DAILY
Qty: 28 CAPSULE | Refills: 0 | OUTPATIENT
Start: 2018-01-16 | End: 2018-12-15 | Stop reason: HOSPADM

## 2018-01-16 RX ORDER — NAPROXEN 500 MG/1
500 TABLET ORAL 2 TIMES DAILY PRN
Qty: 14 TABLET | Refills: 0 | OUTPATIENT
Start: 2018-01-16 | End: 2018-12-15 | Stop reason: HOSPADM

## 2018-01-16 RX ORDER — CEFTRIAXONE 1 G/1
1000 INJECTION, POWDER, FOR SOLUTION INTRAMUSCULAR; INTRAVENOUS ONCE
Status: COMPLETED | OUTPATIENT
Start: 2018-01-16 | End: 2018-01-16

## 2018-01-16 RX ORDER — DOXYCYCLINE 100 MG/1
100 CAPSULE ORAL ONCE
Status: COMPLETED | OUTPATIENT
Start: 2018-01-16 | End: 2018-01-16

## 2018-01-16 RX ORDER — NAPROXEN 500 MG/1
500 TABLET ORAL ONCE
Status: COMPLETED | OUTPATIENT
Start: 2018-01-16 | End: 2018-01-16

## 2018-01-16 RX ADMIN — CEFTRIAXONE SODIUM 1000 MG: 1 INJECTION, POWDER, FOR SOLUTION INTRAMUSCULAR; INTRAVENOUS at 22:37

## 2018-01-16 RX ADMIN — NAPROXEN 500 MG: 500 TABLET ORAL at 21:17

## 2018-01-16 RX ADMIN — DOXYCYCLINE 100 MG: 100 CAPSULE ORAL at 22:37

## 2018-01-16 RX ADMIN — LIDOCAINE HYDROCHLORIDE 2.1 ML: 10 INJECTION, SOLUTION EPIDURAL; INFILTRATION; INTRACAUDAL; PERINEURAL at 22:37

## 2018-01-17 NOTE — ED PROVIDER NOTES
Subjective   HPI Comments: 27-year-old male with history of recurrent epididymitis.  Had 2 cases in 2017.  He is here with progressively worsening moderate to severe achy type pain to his left testicle over the past week.  He also notices progressive swelling to the left testicle as well.  Also notices some tender lymph nodes in his left inguinal region.  No penile discharge or urinary complaints.  Some radiation of the pain to the left lower quadrant.  No fever, nausea or vomiting.  No history of inguinal hernias.    Aggravating factors: Palpation of the testicle.  Alleviating factors and treatment prior to arrival: Ibuprofen.  The patient states that he has seen Dr. Husain in the past.  No STD exposure.      History provided by:  Patient  History limited by: nothing.   used: No        Review of Systems   Constitutional: Negative.    HENT: Negative.    Eyes: Negative.    Respiratory: Negative.    Cardiovascular: Negative.  Negative for chest pain.   Gastrointestinal: Positive for abdominal pain.   Endocrine: Negative.    Genitourinary: Positive for scrotal swelling and testicular pain. Negative for dysuria.   Musculoskeletal: Negative.    Skin: Negative.    Allergic/Immunologic: Negative.    Neurological: Negative.    Hematological: Negative.    Psychiatric/Behavioral: Negative.    All other systems reviewed and are negative.      Past Medical History:   Diagnosis Date   • ADD (attention deficit disorder)    • ADHD (attention deficit hyperactivity disorder)    • Asthma        No Known Allergies    Past Surgical History:   Procedure Laterality Date   • EAR TUBES     • MYRINGOTOMY W/ TUBES     • NASAL SEPTUM SURGERY     • SEPTOPLASTY     • WISDOM TOOTH EXTRACTION     • WISDOM TOOTH EXTRACTION         Family History   Problem Relation Age of Onset   • Diabetes Father    • Heart disease Father    • Hyperlipidemia Father    • Hypertension Father    • Thyroid disease Sister    • Early death Maternal  Grandfather    • Lung cancer Paternal Grandmother    • Cancer Paternal Grandmother    • Heart disease Paternal Grandmother    • Hyperlipidemia Paternal Grandmother    • No Known Problems Paternal Grandfather        Social History     Social History   • Marital status: Single     Spouse name: N/A   • Number of children: N/A   • Years of education: N/A     Social History Main Topics   • Smoking status: Never Smoker   • Smokeless tobacco: Never Used   • Alcohol use 7.2 oz/week     6 Cans of beer, 6 Shots of liquor per week   • Drug use: No   • Sexual activity: Yes     Partners: Female     Other Topics Concern   • None     Social History Narrative           Objective   Physical Exam   Constitutional: He is oriented to person, place, and time. He appears well-developed and well-nourished. No distress.   HENT:   Head: Normocephalic and atraumatic.   Right Ear: External ear normal.   Left Ear: External ear normal.   Eyes: EOM are normal. Pupils are equal, round, and reactive to light.   Neck: Normal range of motion. Neck supple.   Cardiovascular: Normal rate, regular rhythm and normal heart sounds.    Pulmonary/Chest: Effort normal and breath sounds normal. No stridor. He has no wheezes. He exhibits no tenderness.   Abdominal: Soft. He exhibits no distension. There is no rebound and no guarding.   Minimal tenderness of the left lower quadrant   Genitourinary:   Genitourinary Comments: The left testicle is moderately swollen and tender to palpation.  The overlying skin does not appear to be infected.  Normal-appearing circumcised penis.  The left inguinal region has some tender, mildly swollen lymph nodes.  No inguinal hernias present.   Musculoskeletal: Normal range of motion. He exhibits no edema.   Neurological: He is alert and oriented to person, place, and time.   Skin: Skin is warm and dry. No rash noted. He is not diaphoretic.   Psychiatric: He has a normal mood and affect. His behavior is normal. Judgment and  thought content normal.   Nursing note and vitals reviewed.      Procedures         ED Course  ED Course                  MDM  Number of Diagnoses or Management Options  Epididymitis, left: new and requires workup     Amount and/or Complexity of Data Reviewed  Clinical lab tests: ordered and reviewed  Tests in the radiology section of CPT®: ordered and reviewed  Discuss the patient with other providers: yes    Risk of Complications, Morbidity, and/or Mortality  Presenting problems: moderate  Diagnostic procedures: low  Management options: moderate  General comments: We will treat for epididymitis with STD antibiotics.  The patient understands to refrain or abstain from sex until antibiotics are complete.  Return to the ER if worse.  Follow-up with Dr. Husain.  Dr. Chavez aware of and agrees with treatment plan.    Patient Progress  Patient progress: stable      Final diagnoses:   Epididymitis, left            Yinka Serrano PA-C  01/16/18 7238

## 2018-01-17 NOTE — DISCHARGE INSTRUCTIONS
Abstain from sex or use condoms until antibiotics are complete.    Return to the ER for markedly worsening testicular swelling, high fevers, vomiting, new or worsening symptoms.  Follow-up with your doctor and Dr. Husain, urologist, in 2-3 days for reexamination, further workup, treatment and evaluation.

## 2018-01-18 ENCOUNTER — OFFICE VISIT (OUTPATIENT)
Dept: UROLOGY | Facility: CLINIC | Age: 28
End: 2018-01-18

## 2018-01-18 VITALS
TEMPERATURE: 98.2 F | HEART RATE: 87 BPM | DIASTOLIC BLOOD PRESSURE: 89 MMHG | BODY MASS INDEX: 28.25 KG/M2 | HEIGHT: 67 IN | OXYGEN SATURATION: 98 % | SYSTOLIC BLOOD PRESSURE: 139 MMHG | WEIGHT: 180 LBS

## 2018-01-18 DIAGNOSIS — N45.1 EPIDIDYMITIS: Primary | ICD-10-CM

## 2018-01-18 LAB
BACTERIA SPEC AEROBE CULT: NO GROWTH
C TRACH RRNA SPEC DONR QL NAA+PROBE: POSITIVE
N GONORRHOEA DNA SPEC QL NAA+PROBE: NEGATIVE

## 2018-01-18 PROCEDURE — 99214 OFFICE O/P EST MOD 30 MIN: CPT | Performed by: UROLOGY

## 2018-01-18 NOTE — PROGRESS NOTES
Chief Complaint  epididmyitis (seen in the ER two days ago.)        MAI Slater is a 27 y.o. male who was recently in the emergency room for left epididymitis and returns today for follow-up.  One year ago he had an episode of epididymitis on the right side but today is on the left.  He does a lot of physical training trying to get excited to  school.  He was treated for sexual transmitted disease last year and again through the emergency room although there is no history of unusual exposure or other  signs and symptoms of STD.  Last year he had a normal scrotal ultrasound at the time of his complaint but today his ultrasound showed an obvious left epididymal orchitis.    Vitals:    01/18/18 1511   BP: 139/89   Pulse: 87   Temp: 98.2 °F (36.8 °C)   SpO2: 98%       Past Medical History  Past Medical History:   Diagnosis Date   • ADD (attention deficit disorder)    • ADHD (attention deficit hyperactivity disorder)    • Asthma        Past Surgical History  Past Surgical History:   Procedure Laterality Date   • EAR TUBES     • MYRINGOTOMY W/ TUBES     • NASAL SEPTUM SURGERY     • SEPTOPLASTY     • WISDOM TOOTH EXTRACTION     • WISDOM TOOTH EXTRACTION         Medications  has a current medication list which includes the following prescription(s): amphetamine-dextroamphetamine, amphetamine-dextroamphetamine xr, doxycycline, naproxen, venlafaxine xr, and zolpidem.      Allergies  No Known Allergies    Social History  Social History     Social History Narrative       Family History  He has no family history of bladder or kidney cancer  He has no family history of kidney stones      AUA Symptom Score:      Review of Systems  Review of Systems    Physical Exam  Physical Exam   Constitutional: He is oriented to person, place, and time. He appears well-developed and well-nourished.   HENT:   Head: Normocephalic and atraumatic.   Neck: Normal range of motion.   Pulmonary/Chest: Effort normal. No respiratory  distress.   Abdominal: Soft. He exhibits no distension and no mass. There is no tenderness. No hernia.   Genitourinary: Testes normal and penis normal.         Musculoskeletal: Normal range of motion.   Lymphadenopathy:     He has no cervical adenopathy.   Neurological: He is alert and oriented to person, place, and time.   Skin: Skin is warm and dry.   Psychiatric: He has a normal mood and affect. His behavior is normal.   Vitals reviewed.      Labs Recent and today in the office:  Results for orders placed or performed during the hospital encounter of 01/16/18   Urine Culture - Urine, Urine, Clean Catch   Result Value Ref Range    Urine Culture No growth    Urinalysis With / Culture If Indicated - Urine, Clean Catch   Result Value Ref Range    Color, UA Yellow Yellow, Straw    Appearance, UA Cloudy (A) Clear    pH, UA 6.0 5.0 - 8.0    Specific Gravity, UA 1.025 1.005 - 1.030    Glucose, UA Negative Negative    Ketones, UA Negative Negative    Bilirubin, UA Negative Negative    Blood, UA Moderate (2+) (A) Negative    Protein, UA 30 mg/dL (1+) (A) Negative    Leuk Esterase, UA Trace (A) Negative    Nitrite, UA Negative Negative    Urobilinogen, UA 0.2 E.U./dL 0.2 - 1.0 E.U./dL   Urinalysis, Microscopic Only - Urine, Clean Catch   Result Value Ref Range    RBC, UA 6-12 (A) None Seen /HPF    WBC, UA Too Numerous to Count (A) None Seen /HPF    Bacteria, UA None Seen None Seen /HPF    Squamous Epithelial Cells, UA None Seen None Seen, 0-2 /HPF    Hyaline Casts, UA None Seen None Seen /LPF    Methodology Manual Light Microscopy          Assessment & Plan  Left epididymitis: He is currently taking Naprosyn and doxycycline to the emergency room.  I've encouraged him to limit his physical activity is much bed rest as possible and take sitz baths.  He has a physical fitness test coming up and this should be postponed until the pain and swelling in his scrotum has diminished considerably.  He is encouraged to always practice  safe sex to avoid STD.

## 2019-02-22 ENCOUNTER — OFFICE VISIT (OUTPATIENT)
Dept: INTERNAL MEDICINE | Facility: CLINIC | Age: 29
End: 2019-02-22

## 2019-02-22 VITALS
TEMPERATURE: 98.4 F | HEIGHT: 67 IN | BODY MASS INDEX: 29.19 KG/M2 | SYSTOLIC BLOOD PRESSURE: 156 MMHG | OXYGEN SATURATION: 98 % | WEIGHT: 186 LBS | HEART RATE: 92 BPM | DIASTOLIC BLOOD PRESSURE: 89 MMHG

## 2019-02-22 DIAGNOSIS — Z86.19 HISTORY OF MONONUCLEOSIS: ICD-10-CM

## 2019-02-22 DIAGNOSIS — R03.0 ELEVATED BP WITHOUT DIAGNOSIS OF HYPERTENSION: ICD-10-CM

## 2019-02-22 DIAGNOSIS — R53.82 CHRONIC FATIGUE: Primary | ICD-10-CM

## 2019-02-22 DIAGNOSIS — Z83.438 FAMILY HISTORY OF HYPERLIPIDEMIA: ICD-10-CM

## 2019-02-22 PROCEDURE — 99214 OFFICE O/P EST MOD 30 MIN: CPT | Performed by: PHYSICIAN ASSISTANT

## 2019-02-22 RX ORDER — VENLAFAXINE HYDROCHLORIDE 37.5 MG/1
37.5 CAPSULE, EXTENDED RELEASE ORAL DAILY
COMMUNITY
End: 2019-05-30 | Stop reason: ALTCHOICE

## 2019-02-22 NOTE — PROGRESS NOTES
"Subjective     Chief Complaint   Patient presents with   • Fatigue     hard to wake up incoherent        History of Present Illness     Colin Slater is a 28 y.o. male presenting with complaints of fatigue, worsening over the past few weeks.  States he has been sleeping very hard at nighttime and it has been very hard for him to wake up in the mornings.  Has been sleeping through alarms and feels very confused and \"out of it\" when he finally does wake up.  He has been tired the rest of the day as well.  He denies any changes to medication, is on both Adderall and Effexor to help with depression and ADD.  Sees psych.  Denies any changes to his eating habits other than may be eating a little bit healthier recently.  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  He does snore some but not every night.  He does not wake himself up snoring.  Of note his blood pressure was mildly elevated today.  Father has hypertension as well.    Overall mood is doing well on the Effexor.  His Adderall mostly controls his ADD symptoms and allows him to get through his everyday life pretty well.  He denies any SI or HI, AVH.  Denies anxiety.  Denies trouble actually falling asleep at bedtime.    The following portions of the patient's history were reviewed and updated as appropriate: current medications, allergies, PMH.    Review of Systems   Constitutional: Positive for fatigue. Negative for appetite change, chills, fever and unexpected weight change.   HENT: Negative for congestion, ear pain, hearing loss, nosebleeds, sinus pressure, sore throat, tinnitus and trouble swallowing.    Eyes: Negative for photophobia, discharge and visual disturbance.   Respiratory: Negative for cough, chest tightness, shortness of breath and wheezing.    Cardiovascular: Negative for chest pain, palpitations and leg swelling.   Gastrointestinal: Negative for abdominal distention, abdominal pain, blood in stool, constipation, " "diarrhea, nausea and vomiting.   Endocrine: Negative for cold intolerance, heat intolerance, polydipsia, polyphagia and polyuria.   Genitourinary: Negative for difficulty urinating, dysuria, flank pain, frequency, hematuria and urgency.   Musculoskeletal: Negative.  Negative for arthralgias, back pain, joint swelling, myalgias, neck pain and neck stiffness.   Skin: Negative for color change, pallor, rash and wound.   Allergic/Immunologic: Negative for environmental allergies, food allergies and immunocompromised state.   Neurological: Negative for dizziness, weakness, numbness and headaches.   Hematological: Negative for adenopathy. Does not bruise/bleed easily.   Psychiatric/Behavioral: Positive for sleep disturbance. Negative for dysphoric mood, hallucinations and suicidal ideas. The patient is not nervous/anxious.        Objective     Vitals:    02/22/19 1721   BP: 156/89   Pulse: 92   Temp: 98.4 °F (36.9 °C)   SpO2: 98%   Weight: 84.4 kg (186 lb)   Height: 170.2 cm (67.01\")       Physical Exam   Constitutional: Appears well-developed and well-nourished.   HENT:   Right Ear: Tympanic membrane and external ear normal.   Left Ear: Tympanic membrane and external ear normal.   Nose: Nose normal.   Mouth/Throat: Oropharynx is clear and moist.   Eyes: EOM are normal. Pupils are equal, round, and reactive to light.   Neck: Normal range of motion. Neck supple.   Cardiovascular: Normal rate, regular rhythm and normal heart sounds.    Pulmonary/Chest: Effort normal and breath sounds normal.   Abdominal: Soft. Bowel sounds are normal. There is no CVA tenderness.   Musculoskeletal: Normal range of motion.   Lymphadenopathy: No cervical adenopathy noted.   Neurological: Alert and oriented to person, place, and time.   Skin: Skin is warm and dry.   Psychiatric: Exhibits a normal mood and affect.     Assessment/Plan     Diagnoses and all orders for this visit:    Chronic fatigue  -     CBC & Differential  -     Comprehensive " Metabolic Panel  -     TSH  -     T4, free  -     Vitamin D 25 hydroxy  -     Vitamin B12  -     Alexa-Barr Virus VCA Antibody Panel    We will start with labs today.  If labs are unrevealing and problem persists, I recommend a sleep study to assess for PRINCESS.  Patient's blood pressure is mildly elevated today and he does snore on occasion.    History of mononucleosis  -     Alexa-Barr Virus VCA Antibody Panel    Family history of hyperlipidemia  -     Lipid panel    Elevated BP without diagnosis of hypertension    Monitor at this time.  Patient is very active at his job and eats a fairly healthy diet.  Does have family history of high blood pressure.    Xin Hinojosa PA-C  02/22/2019         Please note that portions of this note were completed with a voice recognition program. Efforts were made to edit dictation, but occasionally words are mistranscribed.

## 2019-02-22 NOTE — PATIENT INSTRUCTIONS
Remind them multiple labs- may have multiple orders.    We will start with labs. If labs aren't helpful, we will pursue sleep study to look for sleep apnea.

## 2019-03-04 LAB
CHOLEST SERPL-MCNC: 170 MG/DL (ref 0–199)
HDLC SERPL-MCNC: 25 MG/DL (ref 40–60)
LDLC SERPL CALC-MCNC: 110 MG/DL (ref 0–99)
TRIGL SERPL-MCNC: 175 MG/DL
VLDLC SERPL CALC-MCNC: 35 MG/DL

## 2019-03-05 LAB
25(OH)D3+25(OH)D2 SERPL-MCNC: 23.4 NG/ML (ref 30–100)
ALBUMIN SERPL-MCNC: 4.6 G/DL (ref 3.5–5.5)
ALBUMIN/GLOB SERPL: 1.7 {RATIO} (ref 1.2–2.2)
ALP SERPL-CCNC: 100 IU/L (ref 39–117)
ALT SERPL-CCNC: 21 IU/L (ref 0–44)
AST SERPL-CCNC: 20 IU/L (ref 0–40)
BASOPHILS # BLD AUTO: 0 X10E3/UL (ref 0–0.2)
BASOPHILS NFR BLD AUTO: 0 %
BILIRUB SERPL-MCNC: 0.4 MG/DL (ref 0–1.2)
BUN SERPL-MCNC: 11 MG/DL (ref 6–20)
BUN/CREAT SERPL: 12 (ref 9–20)
CALCIUM SERPL-MCNC: 9.3 MG/DL (ref 8.7–10.2)
CHLORIDE SERPL-SCNC: 108 MMOL/L (ref 96–106)
CO2 SERPL-SCNC: 19 MMOL/L (ref 20–29)
CREAT SERPL-MCNC: 0.93 MG/DL (ref 0.76–1.27)
EBV EA IGG SER-ACNC: 12.3 U/ML (ref 0–8.9)
EBV NA IGG SER IA-ACNC: 136 U/ML (ref 0–17.9)
EBV VCA IGG SER IA-ACNC: 122 U/ML (ref 0–17.9)
EBV VCA IGM SER IA-ACNC: <36 U/ML (ref 0–35.9)
EOSINOPHIL # BLD AUTO: 0.2 X10E3/UL (ref 0–0.4)
EOSINOPHIL NFR BLD AUTO: 4 %
ERYTHROCYTE [DISTWIDTH] IN BLOOD BY AUTOMATED COUNT: 13.1 % (ref 12.3–15.4)
GLOBULIN SER CALC-MCNC: 2.7 G/DL (ref 1.5–4.5)
GLUCOSE SERPL-MCNC: 84 MG/DL (ref 65–99)
HBA1C MFR BLD: 5.1 % (ref 4.8–5.6)
HCT VFR BLD AUTO: 47.7 % (ref 37.5–51)
HGB BLD-MCNC: 16 G/DL (ref 13–17.7)
IMM GRANULOCYTES # BLD AUTO: 0 X10E3/UL (ref 0–0.1)
IMM GRANULOCYTES NFR BLD AUTO: 0 %
LYMPHOCYTES # BLD AUTO: 2.2 X10E3/UL (ref 0.7–3.1)
LYMPHOCYTES NFR BLD AUTO: 39 %
MCH RBC QN AUTO: 29.6 PG (ref 26.6–33)
MCHC RBC AUTO-ENTMCNC: 33.5 G/DL (ref 31.5–35.7)
MCV RBC AUTO: 88 FL (ref 79–97)
MONOCYTES # BLD AUTO: 0.6 X10E3/UL (ref 0.1–0.9)
MONOCYTES NFR BLD AUTO: 11 %
NEUTROPHILS # BLD AUTO: 2.6 X10E3/UL (ref 1.4–7)
NEUTROPHILS NFR BLD AUTO: 46 %
PLATELET # BLD AUTO: 239 X10E3/UL (ref 150–379)
POTASSIUM SERPL-SCNC: 4.5 MMOL/L (ref 3.5–5.2)
PROT SERPL-MCNC: 7.3 G/DL (ref 6–8.5)
RBC # BLD AUTO: 5.4 X10E6/UL (ref 4.14–5.8)
SERVICE CMNT-IMP: ABNORMAL
SODIUM SERPL-SCNC: 144 MMOL/L (ref 134–144)
T4 FREE SERPL-MCNC: 1.25 NG/DL (ref 0.82–1.77)
TSH SERPL DL<=0.005 MIU/L-ACNC: 1.74 UIU/ML (ref 0.45–4.5)
VIT B12 SERPL-MCNC: 462 PG/ML (ref 232–1245)
WBC # BLD AUTO: 5.6 X10E3/UL (ref 3.4–10.8)

## 2019-03-06 ENCOUNTER — OFFICE VISIT (OUTPATIENT)
Dept: INTERNAL MEDICINE | Facility: CLINIC | Age: 29
End: 2019-03-06

## 2019-03-06 VITALS
HEART RATE: 107 BPM | OXYGEN SATURATION: 97 % | SYSTOLIC BLOOD PRESSURE: 130 MMHG | WEIGHT: 183 LBS | BODY MASS INDEX: 28.66 KG/M2 | TEMPERATURE: 98.2 F | DIASTOLIC BLOOD PRESSURE: 90 MMHG

## 2019-03-06 DIAGNOSIS — S39.012A STRAIN OF LUMBAR REGION, INITIAL ENCOUNTER: Primary | ICD-10-CM

## 2019-03-06 DIAGNOSIS — J35.8 CRYPTIC TONSIL: ICD-10-CM

## 2019-03-06 DIAGNOSIS — R06.83 SNORING: ICD-10-CM

## 2019-03-06 PROCEDURE — 99213 OFFICE O/P EST LOW 20 MIN: CPT | Performed by: PHYSICIAN ASSISTANT

## 2019-03-06 RX ORDER — CYCLOBENZAPRINE HCL 10 MG
10 TABLET ORAL 3 TIMES DAILY PRN
Qty: 30 TABLET | Refills: 0 | Status: SHIPPED | OUTPATIENT
Start: 2019-03-06 | End: 2019-05-30

## 2019-03-06 NOTE — PROGRESS NOTES
Subjective     Chief Complaint   Patient presents with   • Back Injury     yesterday while moving       History of Present Illness     Colin AMELIA Slater is a 28 y.o. male presenting with complaints of lower back discomfort.  Patient has been moving so he has been doing a lot of heavy lifting.  His neck and back feel very stiff.  Did not sleep well last night.  Did receive some relief with aspirin.  Patient denies any saddle anesthesia, issue with bowel or bladder, radiation of pain down legs or any numbness tingling or burning.    We had also discussed his chronic fatigue at his last visit.  Labs were unrevealing other than mild vitamin D deficiency.  Patient does have a history of an injury to the nose and a surgery to his nose.  He also feels he has large tonsils and frequently deals with tonsil stones.  I am concerned that this could be contributing to a degree of obstructive sleep apnea.    The following portions of the patient's history were reviewed and updated as appropriate: current medications, allergies, PMH.    Review of Systems   Constitutional: Positive for fatigue. Negative for appetite change, chills, fever and unexpected weight change.   HENT: Negative for congestion, ear pain, hearing loss, nosebleeds, sinus pressure, sore throat, tinnitus and trouble swallowing.    Eyes: Negative for photophobia, discharge and visual disturbance.   Respiratory: Negative for cough, chest tightness, shortness of breath and wheezing.    Cardiovascular: Negative for chest pain, palpitations and leg swelling.   Gastrointestinal: Negative for abdominal distention, abdominal pain, blood in stool, constipation, diarrhea, nausea and vomiting.   Endocrine: Negative for cold intolerance, heat intolerance, polydipsia, polyphagia and polyuria.   Genitourinary: Negative for difficulty urinating, dysuria, flank pain, frequency, hematuria and urgency.   Musculoskeletal: Positive for back pain. Negative for arthralgias, joint swelling,  myalgias, neck pain and neck stiffness.   Skin: Negative for color change, pallor, rash and wound.   Allergic/Immunologic: Negative for environmental allergies, food allergies and immunocompromised state.   Neurological: Negative for dizziness, weakness, numbness and headaches.   Hematological: Negative for adenopathy. Does not bruise/bleed easily.   Psychiatric/Behavioral: Negative for dysphoric mood, hallucinations, sleep disturbance and suicidal ideas. The patient is not nervous/anxious.        Objective     Vitals:    03/06/19 1332   BP: 130/90   Pulse: 107   Temp: 98.2 °F (36.8 °C)   SpO2: 97%   Weight: 83 kg (183 lb)     Physical Exam   Constitutional: He is oriented to person, place, and time. He appears well-developed and well-nourished.   HENT:   Right Ear: Tympanic membrane normal.   Left Ear: Tympanic membrane normal.   Eyes: EOM are normal. Pupils are equal, round, and reactive to light.   Neck: Normal range of motion. Neck supple.   Cardiovascular: Normal rate, regular rhythm and normal heart sounds.   Pulmonary/Chest: Effort normal and breath sounds normal.   Abdominal: Soft. Bowel sounds are normal. There is no CVA tenderness.   Musculoskeletal: Normal range of motion.   Lymphadenopathy:     He has no cervical adenopathy.   Neurological: He is alert and oriented to person, place, and time.   Skin: Skin is warm and dry.   Psychiatric: He has a normal mood and affect.         Assessment/Plan     Diagnoses and all orders for this visit:    Strain of lumbar region, initial encounter  -     cyclobenzaprine (FLEXERIL) 10 MG tablet; Take 1 tablet by mouth 3 (Three) Times a Day As Needed for Muscle Spasms.    Heat TID, gentle ROM and stretching.   Continue NSAIDs, we will add on prn muscle relaxer.    Snoring  -     Ambulatory Referral to ENT (Otolaryngology)    Concern for sleep apnea. Patient would like to see an ENT due to history of surgery to nose, large tonsils, etc.  Consider sleep study.    Cryptic  tonsil  -     Ambulatory Referral to ENT (Otolaryngology)      Xin Hinojosa PA-C  03/06/2019         Please note that portions of this note were completed with a voice recognition program. Efforts were made to edit dictation, but occasionally words are mistranscribed.

## 2019-03-06 NOTE — PATIENT INSTRUCTIONS
Vitamin D and Vitamin B12    Heat a few times a day and stretching     May continue with aspirin as needed

## 2019-03-14 ENCOUNTER — TELEPHONE (OUTPATIENT)
Dept: INTERNAL MEDICINE | Facility: CLINIC | Age: 29
End: 2019-03-14

## 2019-03-14 NOTE — TELEPHONE ENCOUNTER
Patient was seen by Xin on 3/6/19. He states he was seen for back pain. He would  like to see if he could get a work note that could release him from work for a couple of days. He states that his back pain hasn't gotten any better. Patient would like a call back. Please advise.

## 2019-03-14 NOTE — TELEPHONE ENCOUNTER
He may have a note if needed x 2 days. Anything more I'd prefer he come in for an appointment to discuss other options like PT or imaging, etc.

## 2019-05-30 ENCOUNTER — OFFICE VISIT (OUTPATIENT)
Dept: INTERNAL MEDICINE | Facility: CLINIC | Age: 29
End: 2019-05-30

## 2019-05-30 ENCOUNTER — TELEPHONE (OUTPATIENT)
Dept: INTERNAL MEDICINE | Facility: CLINIC | Age: 29
End: 2019-05-30

## 2019-05-30 VITALS
OXYGEN SATURATION: 95 % | TEMPERATURE: 98.3 F | SYSTOLIC BLOOD PRESSURE: 120 MMHG | DIASTOLIC BLOOD PRESSURE: 90 MMHG | BODY MASS INDEX: 29.55 KG/M2 | HEIGHT: 67 IN | WEIGHT: 188.3 LBS | HEART RATE: 97 BPM

## 2019-05-30 DIAGNOSIS — J35.8 CRYPTIC TONSIL: ICD-10-CM

## 2019-05-30 DIAGNOSIS — R06.83 SNORING: Primary | ICD-10-CM

## 2019-05-30 DIAGNOSIS — J01.90 ACUTE NON-RECURRENT SINUSITIS, UNSPECIFIED LOCATION: Primary | ICD-10-CM

## 2019-05-30 PROCEDURE — 99213 OFFICE O/P EST LOW 20 MIN: CPT | Performed by: FAMILY MEDICINE

## 2019-05-30 RX ORDER — AMOXICILLIN AND CLAVULANATE POTASSIUM 875; 125 MG/1; MG/1
1 TABLET, FILM COATED ORAL EVERY 12 HOURS SCHEDULED
Qty: 20 TABLET | Refills: 0 | Status: SHIPPED | OUTPATIENT
Start: 2019-05-30 | End: 2019-06-07 | Stop reason: SINTOL

## 2019-05-30 RX ORDER — METHYLPREDNISOLONE 4 MG/1
TABLET ORAL
Qty: 21 EACH | Refills: 0 | Status: SHIPPED | OUTPATIENT
Start: 2019-05-30 | End: 2019-06-07

## 2019-05-30 NOTE — TELEPHONE ENCOUNTER
Patient needs new referral for ENT and would like for us to schedule it. Last one was closed saying he would but he would not like to.

## 2019-06-03 PROBLEM — J01.90 ACUTE NON-RECURRENT SINUSITIS: Status: ACTIVE | Noted: 2019-06-03

## 2019-06-03 NOTE — ASSESSMENT & PLAN NOTE
Patient's diffuse symptoms are likely secondary to sinusitis. Will treat with augmentin and medrol.  He has been instructed to take motrin/tylenol for fever/pain.

## 2019-06-07 ENCOUNTER — OFFICE VISIT (OUTPATIENT)
Dept: INTERNAL MEDICINE | Facility: CLINIC | Age: 29
End: 2019-06-07

## 2019-06-07 VITALS
SYSTOLIC BLOOD PRESSURE: 143 MMHG | HEART RATE: 108 BPM | OXYGEN SATURATION: 100 % | HEIGHT: 67 IN | RESPIRATION RATE: 16 BRPM | TEMPERATURE: 97.9 F | BODY MASS INDEX: 28.88 KG/M2 | WEIGHT: 184 LBS | DIASTOLIC BLOOD PRESSURE: 79 MMHG

## 2019-06-07 DIAGNOSIS — G47.9 SLEEP DISORDER: ICD-10-CM

## 2019-06-07 DIAGNOSIS — J01.90 ACUTE BACTERIAL SINUSITIS: Primary | ICD-10-CM

## 2019-06-07 DIAGNOSIS — B96.89 ACUTE BACTERIAL SINUSITIS: Primary | ICD-10-CM

## 2019-06-07 DIAGNOSIS — R06.83 SNORING: ICD-10-CM

## 2019-06-07 DIAGNOSIS — R53.82 CHRONIC FATIGUE: ICD-10-CM
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
## 2019-06-07 PROCEDURE — 99214 OFFICE O/P EST MOD 30 MIN: CPT | Performed by: INTERNAL MEDICINE

## 2019-06-07 RX ORDER — METHYLPREDNISOLONE 4 MG/1
TABLET ORAL
Qty: 21 EACH | Refills: 0 | Status: SHIPPED | OUTPATIENT
Start: 2019-06-07 | End: 2020-02-24

## 2019-06-07 RX ORDER — DOXYCYCLINE 100 MG/1
100 CAPSULE ORAL 2 TIMES DAILY
Qty: 14 CAPSULE | Refills: 0 | Status: SHIPPED | OUTPATIENT
Start: 2019-06-07 | End: 2020-02-24

## 2019-06-07 NOTE — PATIENT INSTRUCTIONS
Fatigue  If you have fatigue, you feel tired all the time and have a lack of energy or a lack of motivation. Fatigue may make it difficult to start or complete tasks because of exhaustion. In general, occasional or mild fatigue is often a normal response to activity or life. However, long-lasting (chronic) or extreme fatigue may be a symptom of a medical condition.  Follow these instructions at home:  General instructions  · Watch your fatigue for any changes.  · Go to bed and get up at the same time every day.  · Avoid fatigue by pacing yourself during the day and getting enough sleep at night.  · Maintain a healthy weight.  Medicines  · Take over-the-counter and prescription medicines only as told by your health care provider.  · Take a multivitamin, if told by your health care provider.   · Do not use herbal or dietary supplements unless they are approved by your health care provider.  Activity  · Exercise regularly, as told by your health care provider.  · Use or practice techniques to help you relax, such as yoga, ella chi, meditation, or massage therapy.  Eating and drinking  · Avoid heavy meals in the evening.  · Eat a well-balanced diet, which includes lean proteins, whole grains, plenty of fruits and vegetables, and low-fat dairy products.  · Avoid consuming too much caffeine.  · Avoid the use of alcohol.  · Drink enough fluid to keep your urine pale yellow.  Lifestyle  · Change situations that cause you stress. Try to keep your work and personal schedule in balance.  · Do not use any products that contain nicotine or tobacco, such as cigarettes and e-cigarettes. If you need help quitting, ask your health care provider.  · Do not use drugs.  Contact a health care provider if:  · Your fatigue does not get better.  · You have a fever.  · You suddenly lose or gain weight.  · You have headaches.  · You have trouble falling asleep or sleeping through the night.  · You feel angry, guilty, anxious, or sad.  · You  are unable to have a bowel movement (constipation).  · Your skin is dry.  · You have swelling in your legs or another part of your body.  Get help right away if:  · You feel confused.  · Your vision is blurry.  · You feel faint or you pass out.  · You have a severe headache.  · You have severe pain in your abdomen, your back, or the area between your waist and hips (pelvis).  · You have chest pain, shortness of breath, or an irregular or fast heartbeat.  · You are unable to urinate, or you urinate less than normal.  · You have abnormal bleeding, such as bleeding from the rectum, vagina, nose, lungs, or nipples.  · You vomit blood.  · You have thoughts about hurting yourself or others.  If you ever feel like you may hurt yourself or others, or have thoughts about taking your own life, get help right away. You can go to your nearest emergency department or call:  · Your local emergency services (911 in the [place]  · A suicide crisis helpline, such as the National Suicide Prevention Lifeline at 1-769.214.9412. This is open 24 hours a day.    Summary  · If you have fatigue, you feel tired all the time and have a lack of energy or a lack of motivation.  · Fatigue may make it difficult to start or complete tasks because of exhaustion.  · Long-lasting (chronic) or extreme fatigue may be a symptom of a medical condition.  · Exercise regularly, as told by your health care provider.  · Change situations that cause you stress. Try to keep your work and personal schedule in balance.  This information is not intended to replace advice given to you by your health care provider. Make sure you discuss any questions you have with your health care provider.  Document Released: 10/14/2008 Document Revised: 09/12/2018 Document Reviewed: 09/12/2018  [employer] Interactive Patient Education © 2019 [employer]

## 2019-06-07 NOTE — PROGRESS NOTES
Chief Complaint   Patient presents with   • Sinusitis     patient states he is feeling worse, still having chest congestion, but symtoms have moved more toward his head, patiient also having abdominal bloating since starting the Augmentin, fewer bowel movement, and lots of gas and burping   • Fatigue     patient having chronic fatigue x 2 years, worse the last few months, wakes up some days feeling like he is drunk   • Nose issues     patient had a Rhinoplasty done in 2015 after receiving a  broken nose, now patient having difficulty breathing       Subjective     History of Present Illness   Colin Slater is a 29 y.o. male presenting for follow up.  Patient continues to have sinus symptoms noted on recent visit last week.  He mentioned that he had a rhinoplasty in 2015 by Arthur Patrick - plastic surgeon in [place] and has had sinus issues and difficulty breathing since that procedure.  He was treated recently with augmentin and steroids but feel bloated and constipated as a result.  He stopped the augmentin.     Patient mentions that he has been having persistent fatigue for the last few months. Frequently wakes up fatigued.  He used to be in the  and has been used to waking up and being ready for the day immediately.  Has been feeling sleepy through the day.  This feeling makes his memory seem to be worse.  Symptoms are affecting his ability to function in the  at the risk of a dishonorable discharge. Upon further questioning, he mentioned that he did have 5-6 tick bites in march and April.       The following portions of the patient's history were reviewed and updated as appropriate: allergies, current medications, past family history, past medical history, past social history, past surgical history and problem list.    Review of Systems   Constitutional: Negative for chills, fatigue and fever.   HENT: Positive for congestion and sinus pressure. Negative for ear pain, rhinorrhea and sore throat.     Eyes: Negative for visual disturbance.   Respiratory: Positive for cough. Negative for chest tightness, shortness of breath and wheezing.    Cardiovascular: Negative for chest pain, palpitations and leg swelling.   Gastrointestinal: Negative for abdominal pain, blood in stool, constipation, diarrhea, nausea and vomiting.   Endocrine: Negative for polydipsia and polyuria.   Genitourinary: Negative for dysuria and hematuria.   Musculoskeletal: Negative for arthralgias and back pain.   Skin: Negative for rash.   Neurological: Negative for dizziness, light-headedness, numbness and headaches.   Psychiatric/Behavioral: Negative for dysphoric mood and sleep disturbance. The patient is not nervous/anxious.        No Known Allergies    Past Medical History:   Diagnosis Date   • ADD (attention deficit disorder)    • ADHD (attention deficit hyperactivity disorder)    • Asthma    • Depression        Social History     Socioeconomic History   • Marital status: Single     Spouse name: Not on file   • Number of children: Not on file   • Years of education: Not on file   • Highest education level: Not on file   Tobacco Use   • Smoking status: Never Smoker   • Smokeless tobacco: Never Used   Substance and Sexual Activity   • Alcohol use: Yes     Alcohol/week: 7.2 oz     Types: 6 Cans of beer, 6 Shots of liquor per week     Comment: rarely   • Drug use: No   • Sexual activity: Yes     Partners: Female        Past Surgical History:   Procedure Laterality Date   • EAR TUBES     • MYRINGOTOMY W/ TUBES     • NASAL SEPTUM SURGERY     • SEPTOPLASTY     • WISDOM TOOTH EXTRACTION     • WISDOM TOOTH EXTRACTION         Family History   Problem Relation Age of Onset   • Diabetes Mother    • Diabetes Father    • Heart disease Father    • Hyperlipidemia Father    • Hypertension Father    • Thyroid disease Sister    • Early death Maternal Grandfather    • Lung cancer Paternal Grandmother    • Cancer Paternal Grandmother    • Heart disease  "Paternal Grandmother    • Hyperlipidemia Paternal Grandmother    • No Known Problems Paternal Grandfather          Current Outpatient Medications:   •  amphetamine-dextroamphetamine XR (ADDERALL XR) 20 MG 24 hr capsule, Take 1 capsule by mouth Every Morning. (Patient taking differently: Take 25 mg by mouth Every Morning.), Disp: 30 capsule, Rfl: 0  •  doxycycline (MONODOX) 100 MG capsule, Take 1 capsule by mouth 2 (Two) Times a Day., Disp: 14 capsule, Rfl: 0  •  methylPREDNISolone (MEDROL, ALEX,) 4 MG tablet, Take as directed on package instructions., Disp: 21 each, Rfl: 0    Objective   /79 (BP Location: Left arm)   Pulse 108   Temp 97.9 °F (36.6 °C) (Oral)   Resp 16   Ht 170.2 cm (67\")   Wt 83.5 kg (184 lb)   SpO2 100%   BMI 28.82 kg/m²     Physical Exam   Constitutional: He is oriented to person, place, and time. He appears well-developed and well-nourished.   HENT:   Head: Normocephalic and atraumatic.   Nose: Nose normal.   Mouth/Throat: Oropharynx is clear and moist. No oropharyngeal exudate.   Sinus tenderness, boggy turbinates.   Eyes: Conjunctivae are normal.   Neck: Normal range of motion. Neck supple.   Cardiovascular: Normal rate, regular rhythm and normal heart sounds.   Pulmonary/Chest: Effort normal and breath sounds normal. No respiratory distress. He has no wheezes.   Musculoskeletal: Normal range of motion.   Neurological: He is alert and oriented to person, place, and time.   Skin: No rash noted.   Psychiatric: He has a normal mood and affect. His behavior is normal.   Nursing note and vitals reviewed.      Assessment/Plan   Colin was seen today for sinusitis, fatigue and nose issues.    Diagnoses and all orders for this visit:    Acute bacterial sinusitis  -     doxycycline (MONODOX) 100 MG capsule; Take 1 capsule by mouth 2 (Two) Times a Day.  -     methylPREDNISolone (MEDROL, ALEX,) 4 MG tablet; Take as directed on package instructions.    Chronic fatigue  -     Ambulatory Referral " to Sleep Medicine  -     [facility] (IgG / M)  -     Lyme Disease, PCR  -     Sedimentation Rate  -     C-reactive protein    Snoring  -     Ambulatory Referral to Sleep Medicine    Sleep disorder  -     Ambulatory Referral to Sleep Medicine    Tick bite, initial encounter  -     [facility] (IgG / M)  -     Lyme Disease, PCR  -     Sedimentation Rate  -     C-reactive protein          Discussion Summary:  Patient is a 29 y.o. male presenting for multiple concerns.     1. Acute bacterial Sinusitis  - start doxycycline  - ENT referral for evaluation of left nare which collapses on inhallation    2. Sleep difficulties, Concerns for PRINCESS  - sleep referral placed.     3. Chronic fatigue , recent tick bite  - check for lyme and RMSF.  Doxycycline should empirically cover.   - check inflammatory markers  - recent labs ordered in march were essentially normal.     Light duty advised for the next 3 days.         Follow up:  Return in about 3 months (around 9/7/2019).

## 2019-06-12 NOTE — PROGRESS NOTES
Let the patient know that lyme testing is pending but the [place] and Inflammatory testing were negative.  Please ensure his sinus symptoms have improved.

## 2019-06-13 LAB
B BURGDOR DNA SPEC QL NAA+PROBE: NEGATIVE
CRP SERPL-MCNC: <0.5 MG/DL (ref 0–1)
R RICKETTSI IGG SER QL IA: NEGATIVE
R RICKETTSI IGM SER-ACNC: 0.4 INDEX (ref 0–0.89)

## 2019-07-25 ENCOUNTER — OFFICE VISIT (OUTPATIENT)
Dept: PULMONOLOGY | Facility: CLINIC | Age: 29
End: 2019-07-25

## 2019-07-25 VITALS
HEART RATE: 96 BPM | BODY MASS INDEX: 29.35 KG/M2 | WEIGHT: 187 LBS | OXYGEN SATURATION: 96 % | RESPIRATION RATE: 16 BRPM | HEIGHT: 67 IN | DIASTOLIC BLOOD PRESSURE: 82 MMHG | SYSTOLIC BLOOD PRESSURE: 134 MMHG

## 2019-07-25 DIAGNOSIS — J45.20 MILD INTERMITTENT ASTHMA WITHOUT COMPLICATION: ICD-10-CM

## 2019-07-25 DIAGNOSIS — R06.83 SNORING: Primary | ICD-10-CM

## 2019-07-25 DIAGNOSIS — G47.33 OBSTRUCTIVE SLEEP APNEA: ICD-10-CM

## 2019-07-25 DIAGNOSIS — G47.8 SLEEP TALKING: ICD-10-CM

## 2019-07-25 DIAGNOSIS — G47.19 EXCESSIVE DAYTIME SLEEPINESS: ICD-10-CM

## 2019-07-25 PROCEDURE — 99244 OFF/OP CNSLTJ NEW/EST MOD 40: CPT | Performed by: INTERNAL MEDICINE

## 2019-07-25 PROCEDURE — 95012 NITRIC OXIDE EXP GAS DETER: CPT | Performed by: INTERNAL MEDICINE

## 2019-07-25 RX ORDER — ALBUTEROL SULFATE 90 UG/1
2 AEROSOL, METERED RESPIRATORY (INHALATION) EVERY 4 HOURS PRN
Qty: 1 INHALER | Refills: 5 | Status: SHIPPED | OUTPATIENT
Start: 2019-07-25 | End: 2020-02-24

## 2019-07-25 RX ORDER — LISDEXAMFETAMINE DIMESYLATE 30 MG/1
CAPSULE ORAL EVERY MORNING
COMMUNITY
Start: 2019-06-22 | End: 2020-08-24 | Stop reason: HOSPADM

## 2019-07-25 NOTE — PROGRESS NOTES
CONSULT NOTE    Requested by:   Chato Roca DO Adams, Taryn Ashley, PA-C      Chief Complaint   Patient presents with   • Consult   • Sleeping Problem       Subjective:  Colin Slater is a 29 y.o. male.     History of Present Illness   Patient was sent in today for evaluation of sleep disturbance. Patient says that for the past few years, he has had trouble with mild snoring.     Patient says that he feels tired in the morning after waking up. he is also complaining of usually feeling sleepy while watching TV and sometimes while reading a book.    The patient has been told that he occasionally talks in his sleep.     he is complaining of occasional headaches.    Patient's sleep schedule was reviewed. he drinks 2-4 cups/cans of caffeinated drinks per day.     he does not have a family history of PRINCESS.     He apparently had asthma as a child. He says that when he was 16, he got better. He still occasionally has issues with tightness in his chest especially when he is exposed to strong smells.       The following portions of the patient's history were reviewed and updated as appropriate: allergies, current medications, past family history, past medical history, past social history and past surgical history.    Review of Systems   Constitutional: Positive for fatigue. Negative for chills and fever.   HENT: Negative for sinus pressure, sneezing and sore throat.    Respiratory: Positive for shortness of breath. Negative for cough, chest tightness and wheezing.    Cardiovascular: Positive for palpitations. Negative for leg swelling.   Psychiatric/Behavioral: Positive for sleep disturbance.   All other systems reviewed and are negative.      Past Medical History:   Diagnosis Date   • ADD (attention deficit disorder)    • ADHD (attention deficit hyperactivity disorder)    • Asthma    • Depression        Social History     Tobacco Use   • Smoking status: Never Smoker   • Smokeless tobacco: Never Used   Substance Use  "Topics   • Alcohol use: Yes     Alcohol/week: 7.2 oz     Types: 6 Cans of beer, 6 Shots of liquor per week     Comment: rarely         Objective:  Visit Vitals  /82   Pulse 96   Resp 16   Ht 170.2 cm (67.01\")   Wt 84.8 kg (187 lb)   SpO2 96%   BMI 29.28 kg/m²       Physical Exam   Constitutional: He is oriented to person, place, and time. He appears well-developed and well-nourished.   HENT:   Head: Atraumatic.   Crowded oropharynx.    Eyes: EOM are normal.   Neck: Normal range of motion. Neck supple. No JVD present. No thyromegaly present.   Cardiovascular: Normal rate and regular rhythm.   No murmur heard.  Pulmonary/Chest: Effort normal. No respiratory distress. He has no wheezes. He has no rales.   Musculoskeletal:   Gait was normal.   Neurological: He is alert and oriented to person, place, and time.   Skin: Skin is warm and dry.   Psychiatric: He has a normal mood and affect. His behavior is normal.   Vitals reviewed.      Assessment/Plan:  Colin was seen today for consult and sleeping problem.    Diagnoses and all orders for this visit:    Snoring  -     Polysomnography 4 or More Parameters; Future    Obstructive sleep apnea  -     Polysomnography 4 or More Parameters; Future    Excessive daytime sleepiness  -     Polysomnography 4 or More Parameters; Future    Sleep talking  -     Polysomnography 4 or More Parameters; Future    Mild intermittent asthma without complication  -     Nitric Oxide  -     Peak Flow    Other orders  -     albuterol sulfate HFA (VENTOLIN HFA) 108 (90 Base) MCG/ACT inhaler; Inhale 2 puffs Every 4 (Four) Hours As Needed for Wheezing or Shortness of Air.        Return in about 3 months (around 10/25/2019) for Sleep/[place], Give pt sleep questionairre.    DISCUSSION(if any):  Laboratory workup was also reviewed which showed   Lab Results   Component Value Date    CO2 19 (L) 03/04/2019     ===========================  ===========================    Sleep questionnaire was provided " to the patient.    The pathophysiology of sleep apnea was discussed, with him.     We will encourage him to schedule the sleep study soon.     The patient will definitely need to be considered for an in lab study due to sleep talking among other reasons.  It should be noted that a home sleep study is likely to underestimate the true AHI and is unable to assess sleep stages and abnormal sleep behaviors etc. The patient has understood.    The patient is agreeable to try CPAP/BiPAP, if needed.     He was advised to not take his Vyvanse, on the morning before doing his sleep study, if at all possible.     Patient was educated on good sleep hygiene measures and voiced understanding of the same.     Patient was given reading material regarding sleep apnea.    Patient was advised to start using rescue inhaler for when necessary purposes    Patient was also advised to keep a log of the use of rescue inhaler.     FeNO level was 16 today.    Peak flow was 650 LPM today.      Dictated utilizing Dragon dictation.    This document was electronically signed by Betzy Luke MD on 07/25/19 at 11:03 AM

## 2020-02-24 ENCOUNTER — OFFICE VISIT (OUTPATIENT)
Dept: INTERNAL MEDICINE | Facility: CLINIC | Age: 30
End: 2020-02-24

## 2020-02-24 VITALS
BODY MASS INDEX: 29.66 KG/M2 | HEIGHT: 67 IN | WEIGHT: 189 LBS | TEMPERATURE: 98.5 F | DIASTOLIC BLOOD PRESSURE: 80 MMHG | SYSTOLIC BLOOD PRESSURE: 130 MMHG | HEART RATE: 84 BPM | OXYGEN SATURATION: 98 %

## 2020-02-24 DIAGNOSIS — J34.3 HYPERTROPHY OF BOTH INFERIOR NASAL TURBINATES: ICD-10-CM

## 2020-02-24 DIAGNOSIS — R04.0 FREQUENT NOSEBLEEDS: Primary | ICD-10-CM

## 2020-02-24 DIAGNOSIS — M79.662 PAIN IN LEFT LOWER LEG: ICD-10-CM

## 2020-02-24 PROBLEM — F32.1 MODERATE SINGLE CURRENT EPISODE OF MAJOR DEPRESSIVE DISORDER (HCC): Status: RESOLVED | Noted: 2017-04-24 | Resolved: 2020-02-24

## 2020-02-24 LAB
ALBUMIN SERPL-MCNC: 4.6 G/DL (ref 3.5–5.2)
ALBUMIN/GLOB SERPL: 1.9 G/DL
ALP SERPL-CCNC: 97 U/L (ref 39–117)
ALT SERPL-CCNC: 17 U/L (ref 1–41)
APTT PPP: 37 SECONDS (ref 24.5–37.2)
AST SERPL-CCNC: 13 U/L (ref 1–40)
BASOPHILS # BLD AUTO: 0.03 10*3/MM3 (ref 0–0.2)
BASOPHILS NFR BLD AUTO: 0.5 % (ref 0–1.5)
BILIRUB SERPL-MCNC: 0.3 MG/DL (ref 0.2–1.2)
BUN SERPL-MCNC: 12 MG/DL (ref 6–20)
BUN/CREAT SERPL: 11.2 (ref 7–25)
CALCIUM SERPL-MCNC: 9.5 MG/DL (ref 8.6–10.5)
CHLORIDE SERPL-SCNC: 102 MMOL/L (ref 98–107)
CO2 SERPL-SCNC: 23.9 MMOL/L (ref 22–29)
CREAT SERPL-MCNC: 1.07 MG/DL (ref 0.76–1.27)
EOSINOPHIL # BLD AUTO: 0.19 10*3/MM3 (ref 0–0.4)
EOSINOPHIL NFR BLD AUTO: 3.2 % (ref 0.3–6.2)
ERYTHROCYTE [DISTWIDTH] IN BLOOD BY AUTOMATED COUNT: 12.3 % (ref 12.3–15.4)
GLOBULIN SER CALC-MCNC: 2.4 GM/DL
GLUCOSE SERPL-MCNC: 137 MG/DL (ref 65–99)
HCT VFR BLD AUTO: 47.6 % (ref 37.5–51)
HGB BLD-MCNC: 16.5 G/DL (ref 13–17.7)
IMM GRANULOCYTES # BLD AUTO: 0.02 10*3/MM3 (ref 0–0.05)
IMM GRANULOCYTES NFR BLD AUTO: 0.3 % (ref 0–0.5)
INR PPP: 0.99 (ref 0.9–1.1)
LYMPHOCYTES # BLD AUTO: 1.94 10*3/MM3 (ref 0.7–3.1)
LYMPHOCYTES NFR BLD AUTO: 33 % (ref 19.6–45.3)
MCH RBC QN AUTO: 30.4 PG (ref 26.6–33)
MCHC RBC AUTO-ENTMCNC: 34.7 G/DL (ref 31.5–35.7)
MCV RBC AUTO: 87.8 FL (ref 79–97)
MONOCYTES # BLD AUTO: 0.76 10*3/MM3 (ref 0.1–0.9)
MONOCYTES NFR BLD AUTO: 12.9 % (ref 5–12)
NEUTROPHILS # BLD AUTO: 2.93 10*3/MM3 (ref 1.7–7)
NEUTROPHILS NFR BLD AUTO: 50.1 % (ref 42.7–76)
NRBC BLD AUTO-RTO: 0 /100 WBC (ref 0–0.2)
PLATELET # BLD AUTO: 236 10*3/MM3 (ref 140–450)
POTASSIUM SERPL-SCNC: 4.6 MMOL/L (ref 3.5–5.2)
PROT SERPL-MCNC: 7 G/DL (ref 6–8.5)
PROTHROMBIN TIME: 13.5 SECONDS (ref 12–15.1)
RBC # BLD AUTO: 5.42 10*6/MM3 (ref 4.14–5.8)
SODIUM SERPL-SCNC: 138 MMOL/L (ref 136–145)
WBC # BLD AUTO: 5.87 10*3/MM3 (ref 3.4–10.8)

## 2020-02-24 PROCEDURE — 99214 OFFICE O/P EST MOD 30 MIN: CPT | Performed by: PHYSICIAN ASSISTANT

## 2020-02-24 NOTE — PROGRESS NOTES
Colin Slater is a 29 y.o. male.     Subjective   History of Present Illness   Here today with concern of blood-tinged rhinorrhea and postnasal drip for around 2 months. When he wakes in the morning there is old blood in his nostrils. He is having some headaches.  No fever, chills, dizziness, weakness, sore throat, dysphasia, sinus pain or ear pain.  When he runs or lifts weights he is unable to breathe through his nose due to feeling like there is swelling inside his nose. He has used Nasocort within the last 2 weeks which helped for a few days then stopped so he uses it sporadically. He has not tried anything else for symptoms.  He endorses periodic mild night sweats over the last 3-4 years which have not worsened.  No adenopathy.  No easy bruising.  He was previously referred for sleep study due to fatigue for the last 3-4 years but was unable to complete due to losing insurance. He denies any snoring.  He is known to have had deviated nasal septum corrected around 2015.    Today he also reports intermittent pain in the medial aspect of his left shin for the last 3-4 days. The pain is bothersome later in the day and much better in the morning. The pain is noticeable if he applies increased pressure to the bottom of the left foot/  He has been exercising more than normal including some kickboxing, but he denies any known injury. No swelling or discoloration. He has not tried anythign to help symptoms.         The following portions of the patient's history were reviewed and updated as appropriate: allergies, current medications, past family history, past medical history, past social history, past surgical history and problem list.    Review of Systems   Constitutional: Negative for appetite change, chills, fatigue, fever and unexpected weight change.   HENT: Positive for congestion, nosebleeds, postnasal drip and rhinorrhea. Negative for drooling, ear pain, facial swelling, mouth sores, sinus pressure, sinus pain,  sneezing, sore throat, trouble swallowing and voice change.         Difficulty breathing through nose during exercise   Eyes: Negative for visual disturbance.   Respiratory: Negative for cough, chest tightness, shortness of breath and wheezing.    Cardiovascular: Negative for chest pain, palpitations and leg swelling.   Gastrointestinal: Negative for abdominal pain, blood in stool, diarrhea, nausea and vomiting.   Endocrine: Negative for cold intolerance, heat intolerance, polydipsia, polyphagia and polyuria.   Musculoskeletal: Positive for arthralgias. Negative for back pain, gait problem, joint swelling, myalgias and neck stiffness.   Allergic/Immunologic: Negative for immunocompromised state.   Neurological: Positive for headaches. Negative for dizziness, seizures, syncope, weakness and light-headedness.   Hematological: Negative for adenopathy. Does not bruise/bleed easily.   Psychiatric/Behavioral: Negative for agitation, confusion, dysphoric mood, self-injury and suicidal ideas. The patient is not nervous/anxious.          Objective    Physical Exam   Constitutional: He is oriented to person, place, and time. He appears well-developed and well-nourished. No distress.   Overweight   HENT:   Head: Normocephalic and atraumatic.   Right Ear: External ear normal.   Mouth/Throat: Oropharynx is clear and moist. No oropharyngeal exudate.   Hypertrophy and erythema of inferior nasal turbinates, left greater than right.  No visible source of bleeding in nares.  Minimal white nasal congestion present.      Right TM normal. Left TM scarring.      No sinus tenderness.   Eyes: Pupils are equal, round, and reactive to light. Conjunctivae and EOM are normal. No scleral icterus.   Neck: Normal range of motion. Neck supple.   Cardiovascular: Normal rate, regular rhythm and normal heart sounds. Exam reveals no gallop and no friction rub.   No murmur heard.  Pulmonary/Chest: Effort normal and breath sounds normal. No respiratory  "distress. He has no wheezes. He has no rales. He exhibits no tenderness.   Abdominal: Soft. Bowel sounds are normal. He exhibits no mass. There is no tenderness. No hernia.   Musculoskeletal: Normal range of motion. He exhibits no edema or deformity.        Left lower leg: He exhibits bony tenderness. He exhibits no tenderness, no swelling, no edema, no deformity and no laceration.        Legs:  Lymphadenopathy:     He has no cervical adenopathy.   Neurological: He is alert and oriented to person, place, and time. He displays normal reflexes. No cranial nerve deficit or sensory deficit. He exhibits normal muscle tone. Coordination normal.   Skin: Skin is warm and dry. Capillary refill takes less than 2 seconds. No rash noted. He is not diaphoretic. No erythema. No pallor.   Psychiatric: He has a normal mood and affect. His behavior is normal. Judgment and thought content normal.   Nursing note and vitals reviewed.        /80   Pulse 84   Temp 98.5 °F (36.9 °C)   Ht 170.2 cm (67.01\")   Wt 85.7 kg (189 lb)   SpO2 98%   BMI 29.59 kg/m²     Nursing note and vitals reviewed.          Assessment/Plan   Colin was seen today for nose bleeds.    Diagnoses and all orders for this visit:    Frequent nosebleeds  -     CBC & Differential  -     Comprehensive Metabolic Panel  -     Protime-INR  -     aPTT  -     Ambulatory Referral to ENT (Otolaryngology)    Hypertrophy of both inferior nasal turbinates  -     Ambulatory Referral to ENT (Otolaryngology)    Begin saline nasal rinses frequently throughout the day.  Nasal sprays containing corticosteroids for the time being given inability to visualize source of bleeding.      Pain in left lower leg  Likely an injury from kickboxing exacerbated by increased running. He was advised to ice the area at least once daily for 10 minutes and avoid striking the area until symptoms have completely resolved.  If symptoms worsen or persist an x-ray will be ordered for further " evaluation.      Call or return to clinic if symptoms worsen or persist.

## 2020-02-27 ENCOUNTER — TELEPHONE (OUTPATIENT)
Dept: INTERNAL MEDICINE | Facility: CLINIC | Age: 30
End: 2020-02-27

## 2020-02-27 NOTE — TELEPHONE ENCOUNTER
PT CALLED STATES THAT HE WAS SUPPOSED TO COME IN TODAY TO  A DOCUMENT. HE IS WANTING TO SEE IF IT COULD BE SCANNED AND SENT TO HIS MYCHART. PLEASE FOLLOW UP WITH HIM AS SOON AS POSSIBLE.  [phone]

## 2020-04-29 ENCOUNTER — NURSE TRIAGE (OUTPATIENT)
Dept: CALL CENTER | Facility: HOSPITAL | Age: 30
End: 2020-04-29

## 2020-04-29 NOTE — TELEPHONE ENCOUNTER
Pateint plans to follow advice but employer is also requesting to have screening test done so patient was given information for local test site in [place]    Reason for Disposition  • 1] COVID-19 infection diagnosed or suspected AND [2] mild symptoms (fever, cough) AND [2] no trouble breathing or other complications    Additional Information  • Negative: SEVERE difficulty breathing (e.g., struggling for each breath, speaks in single words)  • Negative: Difficult to awaken or acting confused (e.g., disoriented, slurred speech)  • Negative: Bluish (or gray) lips or face now  • Negative: Shock suspected (e.g., cold/pale/clammy skin, too weak to stand, low BP, rapid pulse)  • Negative: Sounds like a life-threatening emergency to the triager  • Negative: [1] COVID-19 suspected (e.g., cough, fever, shortness of breath) AND [2] public health department recommends testing  • Negative: [1] COVID-19 exposure AND [2] no symptoms  • Negative: COVID-19 and Breastfeeding, questions about  • Negative: SEVERE or constant chest pain (Exception: mild central chest pain, present only when coughing)  • Negative: MODERATE difficulty breathing (e.g., speaks in phrases, SOB even at rest, pulse 100-120)  • Negative: Patient sounds very sick or weak to the triager  • Negative: MILD difficulty breathing (e.g., minimal/no SOB at rest, SOB with walking, pulse <100)  • Negative: Chest pain  • Negative: Fever > 103 F (39.4 C)  • Negative: [1] Fever > 101 F (38.3 C) AND [2] age > 60  • Negative: [1] Fever > 100.0 F (37.8 C) AND [2] bedridden (e.g., nursing home patient, CVA, chronic illness, recovering from surgery)  • Negative: HIGH RISK patient (e.g., age > 64 years, diabetes, heart or lung disease, weak immune system)  • Negative: Fever present > 3 days (72 hours)  • Negative: [1] Fever returns after gone for over 24 hours AND [2] symptoms worse or not improved  • Negative: [1] Continuous (nonstop) coughing interferes with work or school AND  "[2] no improvement using cough treatment per protocol  • Negative: Cough present > 3 weeks    Answer Assessment - Initial Assessment Questions  1. COVID-19 DIAGNOSIS: \"Who made your Coronavirus (COVID-19) diagnosis?\" \"Was it confirmed by a positive lab test?\" If not diagnosed by a HCP, ask \"Are there lots of cases (community spread) where you live?\" (See public health department website, if unsure)    * MAJOR community spread: high number of cases; numbers of cases are increasing; many people hospitalized.    * MINOR community spread: low number of cases; not increasing; few or no people hospitalized      major  2. ONSET: \"When did the COVID-19 symptoms start?\"       2 days ago 4/27  3. WORST SYMPTOM: \"What is your worst symptom?\" (e.g., cough, fever, shortness of breath, muscle aches)      Feels achy, joint pain, feverish at night particularly  4. COUGH: \"How bad is the cough?\"        No cough  5. FEVER: \"Do you have a fever?\" If so, ask: \"What is your temperature, how was it measured, and when did it start?\"      Feels feverish, night sweats are bad but unable to take temperature at home  6. RESPIRATORY STATUS: \"Describe your breathing?\" (e.g., shortness of breath, wheezing, unable to speak)       No soa  7. BETTER-SAME-WORSE: \"Are you getting better, staying the same or getting worse compared to yesterday?\"  If getting worse, ask, \"In what way?\"      same  8. HIGH RISK DISEASE: \"Do you have any chronic medical problems?\" (e.g., asthma, heart or lung disease, weak immune system, etc.)      none  9. PREGNANCY: \"Is there any chance you are pregnant?\" \"When was your last menstrual period?\"      n/a  10. OTHER SYMPTOMS: \"Do you have any other symptoms?\"  (e.g., runny nose, headache, sore throat, loss of smell)        Some sinus congestion but mild    Protocols used: CORONAVIRUS (COVID-19) DIAGNOSED OR SUSPECTED-ADULT-AH      "

## 2020-08-24 ENCOUNTER — ANESTHESIA EVENT (OUTPATIENT)
Dept: PERIOP | Facility: HOSPITAL | Age: 30
End: 2020-08-24

## 2020-08-24 ENCOUNTER — APPOINTMENT (OUTPATIENT)
Dept: CT IMAGING | Facility: HOSPITAL | Age: 30
End: 2020-08-24

## 2020-08-24 ENCOUNTER — HOSPITAL ENCOUNTER (OUTPATIENT)
Facility: HOSPITAL | Age: 30
Discharge: HOME | End: 2020-08-24
Attending: EMERGENCY MEDICINE | Admitting: SURGERY

## 2020-08-24 ENCOUNTER — ANESTHESIA (OUTPATIENT)
Dept: PERIOP | Facility: HOSPITAL | Age: 30
End: 2020-08-24

## 2020-08-24 VITALS
WEIGHT: 188.93 LBS | DIASTOLIC BLOOD PRESSURE: 72 MMHG | HEART RATE: 52 BPM | BODY MASS INDEX: 29.65 KG/M2 | RESPIRATION RATE: 16 BRPM | TEMPERATURE: 98 F | OXYGEN SATURATION: 98 % | SYSTOLIC BLOOD PRESSURE: 137 MMHG | HEIGHT: 67 IN

## 2020-08-24 DIAGNOSIS — K35.30 ACUTE APPENDICITIS WITH LOCALIZED PERITONITIS, WITHOUT PERFORATION, ABSCESS, OR GANGRENE: ICD-10-CM

## 2020-08-24 DIAGNOSIS — K35.80 ACUTE APPENDICITIS, UNSPECIFIED ACUTE APPENDICITIS TYPE: Primary | ICD-10-CM

## 2020-08-24 LAB
ALBUMIN SERPL-MCNC: 4.8 G/DL (ref 3.5–5.2)
ALBUMIN/GLOB SERPL: 1.7 G/DL
ALP SERPL-CCNC: 87 U/L (ref 39–117)
ALT SERPL W P-5'-P-CCNC: 33 U/L (ref 1–41)
ANION GAP SERPL CALCULATED.3IONS-SCNC: 11.7 MMOL/L (ref 5–15)
AST SERPL-CCNC: 24 U/L (ref 1–40)
BACTERIA UR QL AUTO: NORMAL /HPF
BASOPHILS # BLD AUTO: 0.07 10*3/MM3 (ref 0–0.2)
BASOPHILS NFR BLD AUTO: 0.3 % (ref 0–1.5)
BILIRUB SERPL-MCNC: 0.4 MG/DL (ref 0–1.2)
BILIRUB UR QL STRIP: NEGATIVE
BUN SERPL-MCNC: 23 MG/DL (ref 6–20)
BUN/CREAT SERPL: 21.1 (ref 7–25)
CALCIUM SPEC-SCNC: 10.5 MG/DL (ref 8.6–10.5)
CHLORIDE SERPL-SCNC: 100 MMOL/L (ref 98–107)
CLARITY UR: CLEAR
CO2 SERPL-SCNC: 24.3 MMOL/L (ref 22–29)
COLOR UR: YELLOW
CREAT SERPL-MCNC: 1.09 MG/DL (ref 0.76–1.27)
DEPRECATED RDW RBC AUTO: 37.9 FL (ref 37–54)
EOSINOPHIL # BLD AUTO: 0.06 10*3/MM3 (ref 0–0.4)
EOSINOPHIL NFR BLD AUTO: 0.3 % (ref 0.3–6.2)
ERYTHROCYTE [DISTWIDTH] IN BLOOD BY AUTOMATED COUNT: 11.8 % (ref 12.3–15.4)
GFR SERPL CREATININE-BSD FRML MDRD: 81 ML/MIN/1.73
GLOBULIN UR ELPH-MCNC: 2.8 GM/DL
GLUCOSE SERPL-MCNC: 146 MG/DL (ref 65–99)
GLUCOSE UR STRIP-MCNC: NEGATIVE MG/DL
HCT VFR BLD AUTO: 45.4 % (ref 37.5–51)
HGB BLD-MCNC: 16.1 G/DL (ref 13–17.7)
HGB UR QL STRIP.AUTO: NEGATIVE
HOLD SPECIMEN: NORMAL
HOLD SPECIMEN: NORMAL
HYALINE CASTS UR QL AUTO: NORMAL /LPF
IMM GRANULOCYTES # BLD AUTO: 0.08 10*3/MM3 (ref 0–0.05)
IMM GRANULOCYTES NFR BLD AUTO: 0.4 % (ref 0–0.5)
KETONES UR QL STRIP: NEGATIVE
LEUKOCYTE ESTERASE UR QL STRIP.AUTO: NEGATIVE
LIPASE SERPL-CCNC: 23 U/L (ref 13–60)
LYMPHOCYTES # BLD AUTO: 2.08 10*3/MM3 (ref 0.7–3.1)
LYMPHOCYTES NFR BLD AUTO: 10.1 % (ref 19.6–45.3)
MCH RBC QN AUTO: 30.8 PG (ref 26.6–33)
MCHC RBC AUTO-ENTMCNC: 35.5 G/DL (ref 31.5–35.7)
MCV RBC AUTO: 87 FL (ref 79–97)
MONOCYTES # BLD AUTO: 1.53 10*3/MM3 (ref 0.1–0.9)
MONOCYTES NFR BLD AUTO: 7.4 % (ref 5–12)
NEUTROPHILS NFR BLD AUTO: 16.83 10*3/MM3 (ref 1.7–7)
NEUTROPHILS NFR BLD AUTO: 81.5 % (ref 42.7–76)
NITRITE UR QL STRIP: NEGATIVE
NRBC BLD AUTO-RTO: 0 /100 WBC (ref 0–0.2)
PH UR STRIP.AUTO: 5.5 [PH] (ref 5–8)
PLATELET # BLD AUTO: 256 10*3/MM3 (ref 140–450)
PMV BLD AUTO: 10.2 FL (ref 6–12)
POTASSIUM SERPL-SCNC: 4.2 MMOL/L (ref 3.5–5.2)
PROT SERPL-MCNC: 7.6 G/DL (ref 6–8.5)
PROT UR QL STRIP: NEGATIVE
RBC # BLD AUTO: 5.22 10*6/MM3 (ref 4.14–5.8)
RBC # UR: NORMAL /HPF
REF LAB TEST METHOD: NORMAL
SARS-COV-2 RNA PNL SPEC NAA+PROBE: NOT DETECTED
SODIUM SERPL-SCNC: 136 MMOL/L (ref 136–145)
SP GR UR STRIP: 1.02 (ref 1–1.03)
SQUAMOUS #/AREA URNS HPF: NORMAL /HPF
UROBILINOGEN UR QL STRIP: NORMAL
WBC # BLD AUTO: 20.65 10*3/MM3 (ref 3.4–10.8)
WBC UR QL AUTO: NORMAL /HPF
WHOLE BLOOD HOLD SPECIMEN: NORMAL
WHOLE BLOOD HOLD SPECIMEN: NORMAL

## 2020-08-24 PROCEDURE — 96365 THER/PROPH/DIAG IV INF INIT: CPT

## 2020-08-24 PROCEDURE — 25010000002 FENTANYL CITRATE (PF) 100 MCG/2ML SOLUTION: Performed by: NURSE ANESTHETIST, CERTIFIED REGISTERED

## 2020-08-24 PROCEDURE — 80053 COMPREHEN METABOLIC PANEL: CPT | Performed by: EMERGENCY MEDICINE

## 2020-08-24 PROCEDURE — G0378 HOSPITAL OBSERVATION PER HR: HCPCS

## 2020-08-24 PROCEDURE — 83690 ASSAY OF LIPASE: CPT | Performed by: EMERGENCY MEDICINE

## 2020-08-24 PROCEDURE — 81001 URINALYSIS AUTO W/SCOPE: CPT | Performed by: EMERGENCY MEDICINE

## 2020-08-24 PROCEDURE — 74177 CT ABD & PELVIS W/CONTRAST: CPT

## 2020-08-24 PROCEDURE — 25010000003 AMPICILLIN-SULBACTAM PER 1.5 G: Performed by: SURGERY

## 2020-08-24 PROCEDURE — 25010000002 HEPARIN (PORCINE) PER 1000 UNITS: Performed by: SURGERY

## 2020-08-24 PROCEDURE — 25010000002 DEXAMETHASONE PER 1 MG: Performed by: NURSE ANESTHETIST, CERTIFIED REGISTERED

## 2020-08-24 PROCEDURE — 25010000002 HYDROMORPHONE 1 MG/ML SOLUTION

## 2020-08-24 PROCEDURE — 25010000002 IOPAMIDOL 61 % SOLUTION: Performed by: EMERGENCY MEDICINE

## 2020-08-24 PROCEDURE — 25010000002 ONDANSETRON PER 1 MG: Performed by: EMERGENCY MEDICINE

## 2020-08-24 PROCEDURE — 25010000002 HYDROMORPHONE 1 MG/ML SOLUTION: Performed by: SURGERY

## 2020-08-24 PROCEDURE — 25010000002 ONDANSETRON PER 1 MG: Performed by: SURGERY

## 2020-08-24 PROCEDURE — 96361 HYDRATE IV INFUSION ADD-ON: CPT

## 2020-08-24 PROCEDURE — 25010000002 PROPOFOL 200 MG/20ML EMULSION: Performed by: NURSE ANESTHETIST, CERTIFIED REGISTERED

## 2020-08-24 PROCEDURE — 25010000002 PIPERACILLIN SOD-TAZOBACTAM PER 1 G: Performed by: EMERGENCY MEDICINE

## 2020-08-24 PROCEDURE — 99204 OFFICE O/P NEW MOD 45 MIN: CPT | Performed by: SURGERY

## 2020-08-24 PROCEDURE — 99284 EMERGENCY DEPT VISIT MOD MDM: CPT

## 2020-08-24 PROCEDURE — 25010000002 KETOROLAC TROMETHAMINE PER 15 MG: Performed by: EMERGENCY MEDICINE

## 2020-08-24 PROCEDURE — 25010000003 LIDOCAINE 1 % SOLUTION: Performed by: SURGERY

## 2020-08-24 PROCEDURE — 96375 TX/PRO/DX INJ NEW DRUG ADDON: CPT

## 2020-08-24 PROCEDURE — 87635 SARS-COV-2 COVID-19 AMP PRB: CPT | Performed by: EMERGENCY MEDICINE

## 2020-08-24 PROCEDURE — 25010000002 MIDAZOLAM PER 1MG: Performed by: NURSE ANESTHETIST, CERTIFIED REGISTERED

## 2020-08-24 PROCEDURE — 85025 COMPLETE CBC W/AUTO DIFF WBC: CPT | Performed by: EMERGENCY MEDICINE

## 2020-08-24 PROCEDURE — 44970 LAPAROSCOPY APPENDECTOMY: CPT | Performed by: SURGERY

## 2020-08-24 DEVICE — LIGACLIP 10-M/L, 10MM ENDOSCOPIC ROTATING MULTIPLE CLIP APPLIERS
Type: IMPLANTABLE DEVICE | Site: ABDOMEN | Status: FUNCTIONAL
Brand: LIGACLIP

## 2020-08-24 DEVICE — ENDOPATH ETS45 2.5MM RELOADS (VASCULAR/THIN)
Type: IMPLANTABLE DEVICE | Site: ABDOMEN | Status: FUNCTIONAL
Brand: ENDOPATH

## 2020-08-24 DEVICE — ETS45 RELOAD STANDARD 45MM
Type: IMPLANTABLE DEVICE | Site: ABDOMEN | Status: FUNCTIONAL
Brand: ENDOPATH

## 2020-08-24 RX ORDER — ACETAMINOPHEN 500 MG
1000 TABLET ORAL EVERY 6 HOURS SCHEDULED
Status: DISCONTINUED | OUTPATIENT
Start: 2020-08-24 | End: 2020-08-24 | Stop reason: HOSPADM

## 2020-08-24 RX ORDER — MAGNESIUM HYDROXIDE 1200 MG/15ML
LIQUID ORAL AS NEEDED
Status: DISCONTINUED | OUTPATIENT
Start: 2020-08-24 | End: 2020-08-24 | Stop reason: HOSPADM

## 2020-08-24 RX ORDER — HEPARIN SODIUM 5000 [USP'U]/ML
5000 INJECTION, SOLUTION INTRAVENOUS; SUBCUTANEOUS EVERY 8 HOURS SCHEDULED
Status: DISCONTINUED | OUTPATIENT
Start: 2020-08-24 | End: 2020-08-24 | Stop reason: HOSPADM

## 2020-08-24 RX ORDER — KETOROLAC TROMETHAMINE 30 MG/ML
30 INJECTION, SOLUTION INTRAMUSCULAR; INTRAVENOUS ONCE
Status: COMPLETED | OUTPATIENT
Start: 2020-08-24 | End: 2020-08-24

## 2020-08-24 RX ORDER — ONDANSETRON 2 MG/ML
4 INJECTION INTRAMUSCULAR; INTRAVENOUS ONCE
Status: COMPLETED | OUTPATIENT
Start: 2020-08-24 | End: 2020-08-24

## 2020-08-24 RX ORDER — LORAZEPAM 2 MG/ML
1 INJECTION INTRAMUSCULAR
Status: DISCONTINUED | OUTPATIENT
Start: 2020-08-24 | End: 2020-08-24 | Stop reason: HOSPADM

## 2020-08-24 RX ORDER — MORPHINE SULFATE 4 MG/ML
4 INJECTION, SOLUTION INTRAMUSCULAR; INTRAVENOUS ONCE
Status: DISCONTINUED | OUTPATIENT
Start: 2020-08-24 | End: 2020-08-24

## 2020-08-24 RX ORDER — HEPARIN SODIUM 5000 [USP'U]/ML
5000 INJECTION, SOLUTION INTRAVENOUS; SUBCUTANEOUS EVERY 8 HOURS SCHEDULED
Status: DISCONTINUED | OUTPATIENT
Start: 2020-08-24 | End: 2020-08-24

## 2020-08-24 RX ORDER — MIDAZOLAM HYDROCHLORIDE 2 MG/2ML
INJECTION, SOLUTION INTRAMUSCULAR; INTRAVENOUS AS NEEDED
Status: DISCONTINUED | OUTPATIENT
Start: 2020-08-24 | End: 2020-08-24 | Stop reason: SURG

## 2020-08-24 RX ORDER — ONDANSETRON 2 MG/ML
4 INJECTION INTRAMUSCULAR; INTRAVENOUS EVERY 6 HOURS PRN
Status: DISCONTINUED | OUTPATIENT
Start: 2020-08-24 | End: 2020-08-24 | Stop reason: HOSPADM

## 2020-08-24 RX ORDER — MEPERIDINE HYDROCHLORIDE 25 MG/ML
12.5 INJECTION INTRAMUSCULAR; INTRAVENOUS; SUBCUTANEOUS
Status: DISCONTINUED | OUTPATIENT
Start: 2020-08-24 | End: 2020-08-24 | Stop reason: HOSPADM

## 2020-08-24 RX ORDER — LIDOCAINE HYDROCHLORIDE 20 MG/ML
INJECTION, SOLUTION INTRAVENOUS AS NEEDED
Status: DISCONTINUED | OUTPATIENT
Start: 2020-08-24 | End: 2020-08-24 | Stop reason: SURG

## 2020-08-24 RX ORDER — TRAMADOL HYDROCHLORIDE 50 MG/1
50 TABLET ORAL EVERY 6 HOURS PRN
Status: DISCONTINUED | OUTPATIENT
Start: 2020-08-24 | End: 2020-08-24 | Stop reason: HOSPADM

## 2020-08-24 RX ORDER — SODIUM CHLORIDE 0.9 % (FLUSH) 0.9 %
10 SYRINGE (ML) INJECTION AS NEEDED
Status: DISCONTINUED | OUTPATIENT
Start: 2020-08-24 | End: 2020-08-24 | Stop reason: HOSPADM

## 2020-08-24 RX ORDER — ROCURONIUM BROMIDE 10 MG/ML
INJECTION, SOLUTION INTRAVENOUS AS NEEDED
Status: DISCONTINUED | OUTPATIENT
Start: 2020-08-24 | End: 2020-08-24 | Stop reason: SURG

## 2020-08-24 RX ORDER — NEOSTIGMINE METHYLSULFATE 5 MG/5 ML
SYRINGE (ML) INTRAVENOUS AS NEEDED
Status: DISCONTINUED | OUTPATIENT
Start: 2020-08-24 | End: 2020-08-24 | Stop reason: SURG

## 2020-08-24 RX ORDER — ONDANSETRON 2 MG/ML
4 INJECTION INTRAMUSCULAR; INTRAVENOUS ONCE AS NEEDED
Status: DISCONTINUED | OUTPATIENT
Start: 2020-08-24 | End: 2020-08-24 | Stop reason: HOSPADM

## 2020-08-24 RX ORDER — SODIUM CHLORIDE, SODIUM LACTATE, POTASSIUM CHLORIDE, CALCIUM CHLORIDE 600; 310; 30; 20 MG/100ML; MG/100ML; MG/100ML; MG/100ML
125 INJECTION, SOLUTION INTRAVENOUS CONTINUOUS
Status: DISCONTINUED | OUTPATIENT
Start: 2020-08-24 | End: 2020-08-24 | Stop reason: HOSPADM

## 2020-08-24 RX ORDER — LIDOCAINE HYDROCHLORIDE 10 MG/ML
INJECTION, SOLUTION INFILTRATION; PERINEURAL AS NEEDED
Status: DISCONTINUED | OUTPATIENT
Start: 2020-08-24 | End: 2020-08-24 | Stop reason: HOSPADM

## 2020-08-24 RX ORDER — SODIUM CHLORIDE 0.9 % (FLUSH) 0.9 %
3 SYRINGE (ML) INJECTION EVERY 12 HOURS SCHEDULED
Status: DISCONTINUED | OUTPATIENT
Start: 2020-08-24 | End: 2020-08-24 | Stop reason: HOSPADM

## 2020-08-24 RX ORDER — IBUPROFEN 600 MG/1
600 TABLET ORAL EVERY 6 HOURS PRN
Status: DISCONTINUED | OUTPATIENT
Start: 2020-08-24 | End: 2020-08-24 | Stop reason: HOSPADM

## 2020-08-24 RX ORDER — PROPOFOL 10 MG/ML
INJECTION, EMULSION INTRAVENOUS AS NEEDED
Status: DISCONTINUED | OUTPATIENT
Start: 2020-08-24 | End: 2020-08-24 | Stop reason: SURG

## 2020-08-24 RX ORDER — HYDROCODONE BITARTRATE AND ACETAMINOPHEN 7.5; 325 MG/1; MG/1
1 TABLET ORAL EVERY 4 HOURS PRN
Qty: 12 TABLET | Refills: 0 | Status: SHIPPED | OUTPATIENT
Start: 2020-08-24 | End: 2020-09-15

## 2020-08-24 RX ORDER — FENTANYL CITRATE 50 UG/ML
INJECTION, SOLUTION INTRAMUSCULAR; INTRAVENOUS AS NEEDED
Status: DISCONTINUED | OUTPATIENT
Start: 2020-08-24 | End: 2020-08-24 | Stop reason: SURG

## 2020-08-24 RX ORDER — DEXAMETHASONE SODIUM PHOSPHATE 4 MG/ML
INJECTION, SOLUTION INTRA-ARTICULAR; INTRALESIONAL; INTRAMUSCULAR; INTRAVENOUS; SOFT TISSUE AS NEEDED
Status: DISCONTINUED | OUTPATIENT
Start: 2020-08-24 | End: 2020-08-24 | Stop reason: SURG

## 2020-08-24 RX ORDER — BUPIVACAINE HYDROCHLORIDE 2.5 MG/ML
INJECTION, SOLUTION EPIDURAL; INFILTRATION; INTRACAUDAL AS NEEDED
Status: DISCONTINUED | OUTPATIENT
Start: 2020-08-24 | End: 2020-08-24 | Stop reason: HOSPADM

## 2020-08-24 RX ORDER — SODIUM CHLORIDE, SODIUM LACTATE, POTASSIUM CHLORIDE, CALCIUM CHLORIDE 600; 310; 30; 20 MG/100ML; MG/100ML; MG/100ML; MG/100ML
50 INJECTION, SOLUTION INTRAVENOUS CONTINUOUS
Status: DISCONTINUED | OUTPATIENT
Start: 2020-08-24 | End: 2020-08-24

## 2020-08-24 RX ORDER — HEPARIN SODIUM 5000 [USP'U]/ML
5000 INJECTION, SOLUTION INTRAVENOUS; SUBCUTANEOUS ONCE
Status: COMPLETED | OUTPATIENT
Start: 2020-08-24 | End: 2020-08-24

## 2020-08-24 RX ADMIN — GLYCOPYRROLATE 0.4 MG: 0.2 INJECTION, SOLUTION INTRAMUSCULAR; INTRAVENOUS at 12:50

## 2020-08-24 RX ADMIN — MIDAZOLAM HYDROCHLORIDE 2 MG: 1 INJECTION, SOLUTION INTRAMUSCULAR; INTRAVENOUS at 12:19

## 2020-08-24 RX ADMIN — HEPARIN SODIUM 5000 UNITS: 5000 INJECTION INTRAVENOUS; SUBCUTANEOUS at 11:51

## 2020-08-24 RX ADMIN — LIDOCAINE HYDROCHLORIDE 60 MG: 20 INJECTION, SOLUTION INTRAVENOUS at 12:19

## 2020-08-24 RX ADMIN — ONDANSETRON 4 MG: 2 INJECTION INTRAMUSCULAR; INTRAVENOUS at 01:19

## 2020-08-24 RX ADMIN — FENTANYL CITRATE 100 MCG: 50 INJECTION INTRAMUSCULAR; INTRAVENOUS at 12:50

## 2020-08-24 RX ADMIN — KETOROLAC TROMETHAMINE 30 MG: 30 INJECTION, SOLUTION INTRAMUSCULAR at 01:20

## 2020-08-24 RX ADMIN — PROPOFOL 400 MG: 10 INJECTION, EMULSION INTRAVENOUS at 12:19

## 2020-08-24 RX ADMIN — HYDROMORPHONE HYDROCHLORIDE 0.5 MG: 1 INJECTION, SOLUTION INTRAMUSCULAR; INTRAVENOUS; SUBCUTANEOUS at 14:07

## 2020-08-24 RX ADMIN — ONDANSETRON 4 MG: 2 INJECTION INTRAMUSCULAR; INTRAVENOUS at 12:19

## 2020-08-24 RX ADMIN — SODIUM CHLORIDE, POTASSIUM CHLORIDE, SODIUM LACTATE AND CALCIUM CHLORIDE 50 ML/HR: 600; 310; 30; 20 INJECTION, SOLUTION INTRAVENOUS at 10:33

## 2020-08-24 RX ADMIN — DEXAMETHASONE SODIUM PHOSPHATE 8 MG: 4 INJECTION, SOLUTION INTRAMUSCULAR; INTRAVENOUS at 12:19

## 2020-08-24 RX ADMIN — AMPICILLIN SODIUM AND SULBACTAM SODIUM 3 G: 2; 1 INJECTION, POWDER, FOR SOLUTION INTRAVENOUS at 12:19

## 2020-08-24 RX ADMIN — TAZOBACTAM SODIUM AND PIPERACILLIN SODIUM 3.38 G: 375; 3 INJECTION, SOLUTION INTRAVENOUS at 04:34

## 2020-08-24 RX ADMIN — ROCURONIUM BROMIDE 50 MG: 10 INJECTION INTRAVENOUS at 12:19

## 2020-08-24 RX ADMIN — FENTANYL CITRATE 100 MCG: 50 INJECTION INTRAMUSCULAR; INTRAVENOUS at 12:19

## 2020-08-24 RX ADMIN — Medication 0.5 MG: at 14:07

## 2020-08-24 RX ADMIN — SODIUM CHLORIDE, POTASSIUM CHLORIDE, SODIUM LACTATE AND CALCIUM CHLORIDE 125 ML/HR: 600; 310; 30; 20 INJECTION, SOLUTION INTRAVENOUS at 05:34

## 2020-08-24 RX ADMIN — Medication 3 MG: at 12:50

## 2020-08-24 RX ADMIN — HYDROMORPHONE HYDROCHLORIDE 0.5 MG: 1 INJECTION, SOLUTION INTRAMUSCULAR; INTRAVENOUS; SUBCUTANEOUS at 15:30

## 2020-08-24 RX ADMIN — IOPAMIDOL 100 ML: 612 INJECTION, SOLUTION INTRAVENOUS at 02:01

## 2020-08-24 NOTE — ANESTHESIA POSTPROCEDURE EVALUATION
Patient: Colin Slater    Procedure Summary     Date:  08/24/20 Room / Location:  [facility] OR  /  [facility] OR    Anesthesia Start:  1218 Anesthesia Stop:  1313    Procedure:  APPENDECTOMY LAPAROSCOPIC (N/A Abdomen) Diagnosis:       Acute appendicitis, unspecified acute appendicitis type      (Acute appendicitis, unspecified acute appendicitis type [K35.80])    Surgeon:  Indy Mcdaniel MD Provider:  Lyndon Sahu CRNA    Anesthesia Type:  general ASA Status:  2 - Emergent          Anesthesia Type: general    Vitals  Vitals Value Taken Time   /57 8/24/2020  1:13 PM   Temp     Pulse 56 8/24/2020  1:13 PM   Resp     SpO2 100 % 8/24/2020  1:13 PM   Vitals shown include unvalidated device data.        Post Anesthesia Care and Evaluation    Patient location during evaluation: PACU  Patient participation: complete - patient participated  Level of consciousness: awake and alert and awake  Pain score: 3  Pain management: adequate  Airway patency: patent  Anesthetic complications: No anesthetic complications  PONV Status: controlled  Cardiovascular status: acceptable, hemodynamically stable and stable  Respiratory status: acceptable and nasal cannula  Hydration status: acceptable

## 2020-08-24 NOTE — ANESTHESIA PREPROCEDURE EVALUATION
Anesthesia Evaluation     Patient summary reviewed and Nursing notes reviewed   no history of anesthetic complications:  NPO Solid Status: > 8 hours  NPO Liquid Status: > 8 hours           Airway   Mallampati: II  TM distance: >3 FB  Neck ROM: full  Dental      Pulmonary    (+) asthma,  (-) not a smoker  Cardiovascular         Neuro/Psych  (+) headaches, psychiatric history Depression and ADHD,     GI/Hepatic/Renal/Endo      Musculoskeletal     Abdominal    Substance History   (+) alcohol use,      OB/GYN          Other        ROS/Med Hx Other: Labs reviewed                  Anesthesia Plan    ASA 2 - emergent     general   (Risks and benefits discussed including risk of aspiration, recall and dental damage. All patient questions answered.    Will continue with plan of care.)  intravenous induction     Anesthetic plan, all risks, benefits, and alternatives have been provided, discussed and informed consent has been obtained with: patient.

## 2020-08-24 NOTE — ANESTHESIA PROCEDURE NOTES
Airway  Urgency: elective    Date/Time: 8/24/2020 12:19 PM  Airway not difficult    General Information and Staff    Patient location during procedure: OR  CRNA: Lyndon Sahu CRNA    Indications and Patient Condition  Indications for airway management: airway protection    Preoxygenated: yes  Mask difficulty assessment: 1 - vent by mask    Final Airway Details  Final airway type: endotracheal airway      Successful airway: ETT  Cuffed: yes   Successful intubation technique: direct laryngoscopy  Facilitating devices/methods: intubating stylet  Endotracheal tube insertion site: oral  Blade: Lindo  Blade size: 2  ETT size (mm): 7.5  Cormack-Lehane Classification: grade I - full view of glottis  Placement verified by: chest auscultation, capnometry and palpation of cuff   Measured from: lips  ETT/EBT  to lips (cm): 22  Number of attempts at approach: 1    Additional Comments  Airway placed without problems. Dentition and Lips as pre-induction. ETT cuff inflated to minimal occlusive pressure.

## 2020-08-28 LAB
LAB AP CASE REPORT: NORMAL
PATH REPORT.FINAL DX SPEC: NORMAL

## 2020-09-01 ENCOUNTER — OFFICE VISIT (OUTPATIENT)
Dept: SURGERY | Facility: CLINIC | Age: 30
End: 2020-09-01

## 2020-09-01 VITALS
OXYGEN SATURATION: 99 % | WEIGHT: 188.93 LBS | HEART RATE: 81 BPM | SYSTOLIC BLOOD PRESSURE: 118 MMHG | TEMPERATURE: 98 F | HEIGHT: 67 IN | BODY MASS INDEX: 29.65 KG/M2 | DIASTOLIC BLOOD PRESSURE: 74 MMHG

## 2020-09-01 DIAGNOSIS — Z90.49 S/P LAPAROSCOPIC APPENDECTOMY: Primary | ICD-10-CM

## 2020-09-01 PROCEDURE — 99024 POSTOP FOLLOW-UP VISIT: CPT | Performed by: SURGERY

## 2020-09-04 NOTE — OP NOTE
PROCEDURE DATE: 8/24/2020    SURGEON: Indy Mcdaniel MD, FACS    PREOPERATIVE DIAGNOSIS: Acute appendicitis with localized peritonitis    POSTOPERATIVE DIAGNOSIS: Same    PROCEDURE: Laparoscopic appendectomy    ANESTHESIA: GETA    EBL: Minimal    SPECIMENS: Appendix    INDICATIONS FOR THE PROCEDURE: Mr. Slater is a 30-year-old gentleman who presented to the emergency department early this morning with signs and symptoms consistent with acute appendicitis.  The diagnosis was confirmed on CT imaging, and he was counseled regarding the need for laparoscopic appendectomy for definitive management.  We have discussed the risks, benefits, and alternatives the proposed surgical procedure, and he has provided informed consent.    DESCRIPTION OF THE PROCEDURE: On the day of surgery, the patient was evaluated on the regular nursing floor following admission, and later brought to the preoperative holding area to be prepared for the planned surgical procedure.  Once these preparations were adequately made, the patient was then taken to the operating room and placed supine on the operating table where a timeout is performed using the WHO checklist.  The patient received appropriate scheduled antibiotics as well as subcutaneous heparin for DVT prophylaxis.    Following the satisfactory induction of general anesthesia, a Santo catheter was inserted for decompression of the patient's bladder.  The patient's abdomen was then prepped and draped in the usual sterile fashion.  A vertical incision was made just above the umbilicus and was carried through the soft tissue to the level of the fascia.  The fascia was grasped and elevated between Kocher clamps and incised sharply with a knife.  Entry was confirmed into the peritoneum visually and there was no bowel visible in the vicinity of this incision.  Two stay sutures of 0 Vicryl were placed in either side of the fascia and a Gomes cannula was inserted under direct visualization.   The sutures were used to secure the cannula.    The abdomen was insufflated with carbon dioxide to a pressure of 15 mmHg and the laparoscope was introduced.  The abdomen was inspected and no injuries were noted from initial trocar placement.  Following this, 2 additional 5 mm ports were placed in the left lateral and suprapubic positions under direct visualization.  The patient was placed into the Trendelenburg position with the left side down and the appendix was identified in the right lower quadrant.  It was noted to be acutely inflamed.  The mesoappendix was grasped and used to retract the appendix anteriorly towards the abdominal wall.  A [place] dissector was used to make a window in the avascular plane between the appendix and the mesoappendix.  Once this window was created, the linear cutting stapler was introduced through the umbilical port and a single fire of the stapler was used to transect the appendix at its junction with the cecum.  An additional fire of the linear cutting stapler was used to transect the mesoappendix.  The staple lines were inspected and hemostasis was found to be acceptable.  The appendix was then placed into an Endo Catch bag.  The operative field was then irrigated and inspected for hemostasis.  This was found to be excellent.  A brief survey of the remainder of the peritoneal cavity revealed no additional abnormalities. The 5mm abdominal trocars were removed under direct visualization and no bleeding was noted.  The laparoscope was withdrawn and the abdomen was desufflated.  The Gomes cannula was removed out of the umbilical port site followed by the appendix which was completely contained within the Endo Catch bag.  This was passed off the field as specimen.  The fascia of the umbilical port site was then closed using a 0 Vicryl suture placed in a figure-of-eight fashion ×2.  The skin of all incisions was closed using a 4-0 Vicryl suture placed in a subcuticular fashion.   0.25% Marcaine was injected into the wounds for postoperative analgesia.  Mastisol and steri strips were applied to the wounds and covered with dry sterile dressings.    There were no immediate complications noted and the procedure was well tolerated by the patient.  All sponge, needle,  and instrument  counts were correct at the conclusion of procedure.  Dr. Mcdaniel was present scrubbed for the procedure and its entirety.  The patient was awakened from anesthesia and taken to the recovery room in good condition.

## 2020-09-15 PROBLEM — K35.80 ACUTE APPENDICITIS: Status: RESOLVED | Noted: 2020-08-24 | Resolved: 2020-09-15

## 2020-10-01 ENCOUNTER — OFFICE VISIT (OUTPATIENT)
Dept: INTERNAL MEDICINE | Facility: CLINIC | Age: 30
End: 2020-10-01

## 2020-10-01 VITALS
RESPIRATION RATE: 15 BRPM | HEART RATE: 116 BPM | TEMPERATURE: 101.6 F | BODY MASS INDEX: 29.98 KG/M2 | SYSTOLIC BLOOD PRESSURE: 158 MMHG | HEIGHT: 67 IN | OXYGEN SATURATION: 100 % | WEIGHT: 191 LBS | DIASTOLIC BLOOD PRESSURE: 55 MMHG

## 2020-10-01 DIAGNOSIS — J02.9 ACUTE PHARYNGITIS, UNSPECIFIED ETIOLOGY: Primary | ICD-10-CM

## 2020-10-01 DIAGNOSIS — R50.9 FEVER, UNSPECIFIED FEVER CAUSE: ICD-10-CM

## 2020-10-01 DIAGNOSIS — R03.0 ELEVATED BLOOD PRESSURE READING: ICD-10-CM

## 2020-10-01 LAB
EXPIRATION DATE: NORMAL
HETEROPH AB SER QL LA: NEGATIVE
INTERNAL CONTROL: NORMAL
Lab: NORMAL

## 2020-10-01 PROCEDURE — 86308 HETEROPHILE ANTIBODY SCREEN: CPT | Performed by: NURSE PRACTITIONER

## 2020-10-01 PROCEDURE — 99214 OFFICE O/P EST MOD 30 MIN: CPT | Performed by: NURSE PRACTITIONER

## 2020-10-01 RX ORDER — AMOXICILLIN AND CLAVULANATE POTASSIUM 875; 125 MG/1; MG/1
1 TABLET, FILM COATED ORAL 2 TIMES DAILY
Qty: 20 TABLET | Refills: 0 | Status: SHIPPED | OUTPATIENT
Start: 2020-10-01 | End: 2020-10-11

## 2020-10-15 ENCOUNTER — TELEPHONE (OUTPATIENT)
Dept: SURGERY | Facility: CLINIC | Age: 30
End: 2020-10-15

## 2020-10-15 NOTE — TELEPHONE ENCOUNTER
Mr. Slater is 7.5 weeks out from a laparoscopic appendectomy.  From my perspective, he is no longer on activity restrictions.

## 2020-10-15 NOTE — TELEPHONE ENCOUNTER
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 He wanted to know if he could have an excuse. He stated he was still having some tenderness with a lot of activity. Please advise.   "

## 2021-05-03 ENCOUNTER — OFFICE VISIT (OUTPATIENT)
Dept: INTERNAL MEDICINE | Facility: CLINIC | Age: 31
End: 2021-05-03

## 2021-05-03 VITALS
WEIGHT: 195 LBS | HEIGHT: 67 IN | OXYGEN SATURATION: 98 % | DIASTOLIC BLOOD PRESSURE: 78 MMHG | TEMPERATURE: 97.5 F | BODY MASS INDEX: 30.61 KG/M2 | SYSTOLIC BLOOD PRESSURE: 146 MMHG | HEART RATE: 97 BPM

## 2021-05-03 DIAGNOSIS — R00.2 HEART PALPITATIONS: Primary | ICD-10-CM

## 2021-05-03 DIAGNOSIS — R55 SYNCOPE, UNSPECIFIED SYNCOPE TYPE: ICD-10-CM

## 2021-05-03 DIAGNOSIS — R29.818 SUSPECTED SLEEP APNEA: ICD-10-CM

## 2021-05-03 PROCEDURE — 99215 OFFICE O/P EST HI 40 MIN: CPT | Performed by: PHYSICIAN ASSISTANT

## 2021-05-03 PROCEDURE — 93000 ELECTROCARDIOGRAM COMPLETE: CPT | Performed by: PHYSICIAN ASSISTANT

## 2021-05-03 NOTE — PROGRESS NOTES
"Chief Complaint  Fatigue (heart palpitations, stress)    Subjective          Colin Slater presents to [facility]  History of Present Illness  Here today with concern of near syncope. Yesterday he awoke around 10 minutes before his alarm and felt as though his heart was working harder than normal. he then felt his heart rate speeding up then going back to normal after around 30 seconds after taking 4 deep breaths. He then got up and took a few steps before his vision blacked out and he slumped against a wall. Vision returned after a couple of seconds. No head injury. Had some chest discomfort at the time and his left armpit felt cold and tingly for a few seconds. He then felt abnormally tired so went back to bed and slept deeply for 11 hours. He slept 4-5 hours prior to event which is normal for him. He denies possible dehydration.  He does drink energy drinks due to chronic fatigue.  No recent GI issues. 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  Friday (2 days prior) he had 4 beers which is double normal consumption. Taking vyvanse 20 mg daily which is reduced from 50 mg daily in the past and he has always tolerated it well.  Markedly deviated nasal septum requiring surgery soon due to contributor to suspected sleep apnea. He had a sleep study around 1 year ago but never heard any official results. The ENT he is seeing needs records from the sleep study before getting surgery approved.           Objective   Vital Signs:   /78   Pulse 97   Temp 97.5 °F (36.4 °C)   Ht 170.2 cm (67.01\")   Wt 88.5 kg (195 lb)   SpO2 98%   BMI 30.53 kg/m²     Physical Exam  Vitals and nursing note reviewed.   Constitutional:       General: He is not in acute distress.     Appearance: He is well-developed. He is not ill-appearing, toxic-appearing or diaphoretic.   HENT:      Head: Normocephalic and atraumatic.      Right Ear: Tympanic membrane, ear canal and external ear " normal. There is no impacted cerumen.      Left Ear: Tympanic membrane, ear canal and external ear normal. There is no impacted cerumen.      Nose: Congestion (mild) present.      Comments: Markedly deviated nasal septum toward right, moderate erythema bilateral nares.      Mouth/Throat:      Mouth: Mucous membranes are moist.      Pharynx: No oropharyngeal exudate or posterior oropharyngeal erythema.   Eyes:      General: No scleral icterus.     Extraocular Movements: Extraocular movements intact.      Conjunctiva/sclera: Conjunctivae normal.      Pupils: Pupils are equal, round, and reactive to light.   Cardiovascular:      Rate and Rhythm: Normal rate. Rhythm irregular.  No extrasystoles are present.     Chest Wall: PMI is not displaced. No thrill.      Heart sounds: Normal heart sounds. No murmur heard.   No friction rub. No gallop.       Comments: Slight variation in rhythm with inspiration and expiration, likely sinus arrhythmia.   Pulmonary:      Effort: Pulmonary effort is normal. No respiratory distress.      Breath sounds: Normal breath sounds. No wheezing, rhonchi or rales.   Chest:      Chest wall: No tenderness.   Abdominal:      General: Bowel sounds are normal.      Palpations: Abdomen is soft.      Tenderness: There is no abdominal tenderness. There is no right CVA tenderness, left CVA tenderness, guarding or rebound.   Musculoskeletal:         General: No tenderness or deformity. Normal range of motion.      Cervical back: Normal range of motion and neck supple. No rigidity or tenderness.      Right lower leg: No edema.      Left lower leg: No edema.   Lymphadenopathy:      Cervical: No cervical adenopathy.   Skin:     General: Skin is warm and dry.      Capillary Refill: Capillary refill takes less than 2 seconds.      Coloration: Skin is not pale.      Findings: No erythema or rash.   Neurological:      Mental Status: He is alert and oriented to person, place, and time.      Cranial Nerves: No  cranial nerve deficit.      Sensory: No sensory deficit.      Motor: No abnormal muscle tone.      Coordination: Coordination normal.      Gait: Gait normal.      Deep Tendon Reflexes: Reflexes normal.   Psychiatric:         Mood and Affect: Mood normal.         Behavior: Behavior normal.         Thought Content: Thought content normal.         Judgment: Judgment normal.            ECG 12 Lead    Date/Time: 5/3/2021 12:26 PM  Performed by: Mirna Santo PA  Authorized by: Mirna Santo PA   Comparison: not compared with previous ECG   Previous ECG: no previous ECG available  Rhythm: sinus arrhythmia  Rhythm comments: arrhythmia appears to be respiratory  Rate: normal  BPM: 73  Conduction: conduction normal  ST Segments: ST segments normal  T Waves: T waves normal  QRS axis: normal  Other: no other findings    Clinical impression: normal ECG              Assessment and Plan    Diagnoses and all orders for this visit:    1. Heart palpitations (Primary)  -     CBC (No Diff)  -     Comprehensive Metabolic Panel  -     TSH Rfx On Abnormal To Free T4  -     ECG 12 Lead - unremarkable.     2. Syncope, unspecified syncope type  -     CBC (No Diff)  -     Comprehensive Metabolic Panel  -     TSH Rfx On Abnormal To Free T4  -     ECG 12 Lead    3. Suspected sleep apnea  Requesting record of sleep study from [facility]       ER with return of any similar symptoms. Suspect sleep apnea is cause of symptoms.       I spent 51 minutes caring for Colin on this date of service. This time includes time spent by me in the following activities:preparing for the visit, obtaining and/or reviewing a separately obtained history, performing a medically appropriate examination and/or evaluation , counseling and educating the patient/family/caregiver, ordering medications, tests, or procedures, referring and communicating with other health care professionals , documenting information in the medical record and  independently interpreting results and communicating that information with the patient/family/caregiver  Follow Up   Return if symptoms worsen or fail to improve.  Patient was given instructions and counseling regarding his condition or for health maintenance advice. Please see specific information pulled into the AVS if appropriate.

## 2021-05-04 LAB
ALBUMIN SERPL-MCNC: 4.8 G/DL (ref 3.5–5.2)
ALBUMIN/GLOB SERPL: 2.2 G/DL
ALP SERPL-CCNC: 96 U/L (ref 39–117)
ALT SERPL-CCNC: 27 U/L (ref 1–41)
AST SERPL-CCNC: 17 U/L (ref 1–40)
BILIRUB SERPL-MCNC: 0.3 MG/DL (ref 0–1.2)
BUN SERPL-MCNC: 15 MG/DL (ref 6–20)
BUN/CREAT SERPL: 15.3 (ref 7–25)
CALCIUM SERPL-MCNC: 10.2 MG/DL (ref 8.6–10.5)
CHLORIDE SERPL-SCNC: 104 MMOL/L (ref 98–107)
CO2 SERPL-SCNC: 27.8 MMOL/L (ref 22–29)
CREAT SERPL-MCNC: 0.98 MG/DL (ref 0.76–1.27)
ERYTHROCYTE [DISTWIDTH] IN BLOOD BY AUTOMATED COUNT: 12.1 % (ref 12.3–15.4)
GLOBULIN SER CALC-MCNC: 2.2 GM/DL
GLUCOSE SERPL-MCNC: 83 MG/DL (ref 65–99)
HCT VFR BLD AUTO: 51.5 % (ref 37.5–51)
HGB BLD-MCNC: 17.1 G/DL (ref 13–17.7)
MCH RBC QN AUTO: 31.2 PG (ref 26.6–33)
MCHC RBC AUTO-ENTMCNC: 33.2 G/DL (ref 31.5–35.7)
MCV RBC AUTO: 94 FL (ref 79–97)
PLATELET # BLD AUTO: 231 10*3/MM3 (ref 140–450)
POTASSIUM SERPL-SCNC: 5.1 MMOL/L (ref 3.5–5.2)
PROT SERPL-MCNC: 7 G/DL (ref 6–8.5)
RBC # BLD AUTO: 5.48 10*6/MM3 (ref 4.14–5.8)
SODIUM SERPL-SCNC: 140 MMOL/L (ref 136–145)
TSH SERPL DL<=0.005 MIU/L-ACNC: 2.56 UIU/ML (ref 0.27–4.2)
WBC # BLD AUTO: 6.8 10*3/MM3 (ref 3.4–10.8)

## 2021-05-05 ENCOUNTER — TELEPHONE (OUTPATIENT)
Dept: INTERNAL MEDICINE | Facility: CLINIC | Age: 31
End: 2021-05-05

## 2021-05-05 NOTE — TELEPHONE ENCOUNTER
PATIENT CALLED CHECKING ON HIS NOTE AND WANTED TO ADD THAT HIS JOB IS REFUSING THE NOTE DUE TO THE FACT HE WENT ON 05/03/2021 AND NOT ON 05/02/2021 AND THE NOTE STATED HE MAY BE EXCUSED FOR 05/02/2021 SO IF THE NOTE CAN BE CHANGED TO STATING PATIENT IS EXCUSED FOR 05/02/2021 AND 05/03/2021 WOULD BE GREATLY APPRECIATED.

## 2021-05-05 NOTE — TELEPHONE ENCOUNTER
Caller: Colin Slater    Relationship: Self    Best call back number: [phone]    What was the call regarding: PATIENT WOULD LIKE A CALL BACK REGARDING GETTING A NOTE FOR HIS WORK THAT SHOW'S THAT HE WAS SEEN IN THE OFFICE 05/03/2021 AND MADE HIS APPOINTMENT 05/03/2021 DUE TO THE OFFICE BEING CLOSED ON Sunday's THE DAY HE CALLED OUT OF WORK.     PATIENT STATED THAT HE DID GET A DOCTOR NOTE WHEN HE WAS SEEN IN THE OFFICE 05/03/2021 BUT HE IS GETTING PUSH BACK FROM HIS EMPLOYER THAT HIS DOCTOR NOTE IS NOT WORDED CORRECTLY WITH THE THE NOTE STATING THAT PATIENT (MAY) BE COVERED AND THE NOTE DON'T SHOW WHEN HE MADE HIS APPOINTMENT FOR 05/03/2021    Do you require a callback: YES

## 2021-07-31 ENCOUNTER — OFFICE VISIT (OUTPATIENT)
Dept: INTERNAL MEDICINE | Facility: CLINIC | Age: 31
End: 2021-07-31

## 2021-07-31 ENCOUNTER — LAB (OUTPATIENT)
Dept: LAB | Facility: HOSPITAL | Age: 31
End: 2021-07-31

## 2021-07-31 VITALS
RESPIRATION RATE: 15 BRPM | OXYGEN SATURATION: 98 % | BODY MASS INDEX: 30.7 KG/M2 | WEIGHT: 191 LBS | TEMPERATURE: 98.6 F | DIASTOLIC BLOOD PRESSURE: 87 MMHG | HEIGHT: 66 IN | SYSTOLIC BLOOD PRESSURE: 134 MMHG | HEART RATE: 104 BPM

## 2021-07-31 DIAGNOSIS — J06.9 ACUTE URI: ICD-10-CM

## 2021-07-31 DIAGNOSIS — Z11.52 ENCOUNTER FOR SCREENING FOR COVID-19: Primary | ICD-10-CM

## 2021-07-31 DIAGNOSIS — R05.9 COUGH: ICD-10-CM

## 2021-07-31 PROCEDURE — U0004 COV-19 TEST NON-CDC HGH THRU: HCPCS | Performed by: NURSE PRACTITIONER

## 2021-07-31 PROCEDURE — 99213 OFFICE O/P EST LOW 20 MIN: CPT | Performed by: NURSE PRACTITIONER

## 2021-07-31 RX ORDER — AMOXICILLIN AND CLAVULANATE POTASSIUM 875; 125 MG/1; MG/1
1 TABLET, FILM COATED ORAL 2 TIMES DAILY
Qty: 20 TABLET | Refills: 0 | Status: SHIPPED | OUTPATIENT
Start: 2021-07-31 | End: 2021-08-10

## 2021-07-31 NOTE — PROGRESS NOTES
Chief Complaint / Reason:      Chief Complaint   Patient presents with   • Cough     x 2 days   • Generalized Body Aches     x 2 days. headaches. has been exposed to covid. i was in [facility]. for two weeks       Subjective     HPI  Patient presents today with complaints of cough for several days and generalized body aches.  He is also had headache and has more than likely been exposed to Covid as he was in [place] for 2 weeks for training.  He denies loss of taste or smell that he is aware of and denies any fever chills he has tried taking over-the-counter medication which include [place] cold and sinus with minimal relief he does have a history of seasonal allergies.  He states the cough is productive and is yellowish-greenish in color.  He does have a picture of it on his phone and it appears to be thick secretions but no blood was noted.  He states that he does use nasal spray he has had history of ear tubes and nasal septum surgery.  History taken from: patient    PMH/FH/Social History were reviewed and updated appropriately in the electronic medical record.   Past Medical History:   Diagnosis Date   • Acute appendicitis 8/24/2020   • ADD (attention deficit disorder)    • ADHD (attention deficit hyperactivity disorder)    • Asthma    • Depression    • Moderate single current episode of major depressive disorder (CMS/[facility]) 4/24/2017     Past Surgical History:   Procedure Laterality Date   • APPENDECTOMY N/A 8/24/2020    Procedure: APPENDECTOMY LAPAROSCOPIC;  Surgeon: Indy Mcdaniel MD;  Location: [facility];  Service: General;  Laterality: N/A;   • EAR TUBES     • MYRINGOTOMY W/ TUBES     • NASAL SEPTUM SURGERY     • SEPTOPLASTY     • WISDOM TOOTH EXTRACTION     • WISDOM TOOTH EXTRACTION       Social History     Socioeconomic History   • Marital status: Single     Spouse name: Not on file   • Number of children: Not on file   • Years of education: Not on file   • Highest education level: Not on file    Tobacco Use   • Smoking status: Current Some Day Smoker     Years: 8.00     Types: Cigars   • Smokeless tobacco: Never Used   • Tobacco comment: very rarely   Vaping Use   • Vaping Use: Never used   Substance and Sexual Activity   • Alcohol use: Yes     Alcohol/week: 12.0 standard drinks     Types: 6 Cans of beer, 6 Shots of liquor per week     Comment: rarely   • Drug use: No   • Sexual activity: Yes     Partners: Female     Family History   Problem Relation Age of Onset   • Diabetes Mother    • Diabetes Father    • Heart disease Father    • Hyperlipidemia Father    • Hypertension Father    • Thyroid disease Sister    • Early death Maternal Grandfather    • Lung cancer Paternal Grandmother    • Cancer Paternal Grandmother    • Heart disease Paternal Grandmother    • Hyperlipidemia Paternal Grandmother    • No Known Problems Paternal Grandfather        Review of Systems:   Review of Systems   Constitutional: Positive for fatigue. Negative for chills and fever.   HENT: Positive for congestion and sinus pressure. Negative for ear pain, postnasal drip and sore throat.    Eyes: Negative.  Negative for pain.   Respiratory: Positive for cough. Negative for shortness of breath.    Cardiovascular: Negative.    Gastrointestinal: Negative.    Musculoskeletal: Positive for myalgias.   Allergic/Immunologic: Positive for environmental allergies.   Neurological: Positive for headache. Negative for dizziness.   Psychiatric/Behavioral: Positive for stress.         All other systems were reviewed and are negative.  Exceptions are noted in the subjective or above.      Objective     Vital Signs  Vitals:    07/31/21 1315   BP: 134/87   Pulse: 104   Resp: 15   Temp: 98.6 °F (37 °C)   SpO2: 98%       Body mass index is 30.83 kg/m².    Physical Exam  Vitals and nursing note reviewed.   Constitutional:       General: He is not in acute distress.     Appearance: He is well-developed.   HENT:      Head: Normocephalic and atraumatic.       Right Ear: Ear canal and external ear normal. Tympanic membrane is erythematous and bulging.      Left Ear: Ear canal and external ear normal. Tympanic membrane is erythematous and bulging.      Nose: Mucosal edema, congestion and rhinorrhea present.      Right Sinus: Maxillary sinus tenderness and frontal sinus tenderness present.      Left Sinus: Maxillary sinus tenderness and frontal sinus tenderness present.      Mouth/Throat:      Mouth: Mucous membranes are dry.      Pharynx: Posterior oropharyngeal erythema present. No oropharyngeal exudate.   Eyes:      Conjunctiva/sclera: Conjunctivae normal.      Right eye: Right conjunctiva is not injected.      Left eye: Left conjunctiva is not injected.      Pupils: Pupils are equal, round, and reactive to light.   Cardiovascular:      Rate and Rhythm: Regular rhythm. Tachycardia present.      Pulses: Normal pulses.      Heart sounds: Normal heart sounds.   Pulmonary:      Effort: Pulmonary effort is normal. No respiratory distress.      Breath sounds: Normal breath sounds.   Lymphadenopathy:      Head:      Right side of head: No submental, submandibular or tonsillar adenopathy.      Left side of head: No submental, submandibular or tonsillar adenopathy.      Cervical: No cervical adenopathy.   Skin:     General: Skin is warm and dry.      Capillary Refill: Capillary refill takes less than 2 seconds.   Neurological:      Mental Status: He is alert and oriented to person, place, and time.                  Medication Review:     Current Outpatient Medications:   •  Vyvanse 10 MG capsule, , Disp: , Rfl:   •  amoxicillin-clavulanate (Augmentin) 875-125 MG per tablet, Take 1 tablet by mouth 2 (Two) Times a Day for 10 days., Disp: 20 tablet, Rfl: 0    Diagnoses and all orders for this visit:    Encounter for screening for COVID-19  -     COVID-19 PCR, [facility], NP SWAB IN [place] VIRAL TRANSPORT MEDIA/ORAL SWISH 24-30 HR TAT - Swab, Nasopharynx    Cough  -     COVID-19 PCR,  [facility], NP SWAB IN [place] VIRAL TRANSPORT MEDIA/ORAL SWISH 24-30 HR TAT - Swab, Nasopharynx    Acute URI  -     amoxicillin-clavulanate (Augmentin) 875-125 MG per tablet; Take 1 tablet by mouth 2 (Two) Times a Day for 10 days.    Recommend cough and deep breathing and advised patient increase water intake to help loosen secretions.      Return if symptoms worsen or fail to improve.    Allyson Gamez, APRN  07/31/2021

## 2021-08-02 LAB — SARS-COV-2 RNA NOSE QL NAA+PROBE: NOT DETECTED

## 2021-09-22 ENCOUNTER — OFFICE VISIT (OUTPATIENT)
Dept: INTERNAL MEDICINE | Facility: CLINIC | Age: 31
End: 2021-09-22

## 2021-09-22 VITALS
HEIGHT: 66 IN | DIASTOLIC BLOOD PRESSURE: 84 MMHG | SYSTOLIC BLOOD PRESSURE: 124 MMHG | TEMPERATURE: 97.8 F | WEIGHT: 195.12 LBS | RESPIRATION RATE: 15 BRPM | HEART RATE: 91 BPM | BODY MASS INDEX: 31.36 KG/M2 | OXYGEN SATURATION: 97 %

## 2021-09-22 DIAGNOSIS — Z20.828 EXPOSURE TO INFLUENZA: ICD-10-CM

## 2021-09-22 DIAGNOSIS — J45.990 EXERCISE INDUCED BRONCHOSPASM: ICD-10-CM

## 2021-09-22 DIAGNOSIS — J20.9 ACUTE BRONCHITIS, UNSPECIFIED ORGANISM: Primary | ICD-10-CM

## 2021-09-22 LAB
EXPIRATION DATE: NORMAL
FLUAV AG NPH QL: NEGATIVE
FLUBV AG NPH QL: NEGATIVE
INTERNAL CONTROL: NORMAL
Lab: NORMAL

## 2021-09-22 PROCEDURE — 99213 OFFICE O/P EST LOW 20 MIN: CPT | Performed by: NURSE PRACTITIONER

## 2021-09-22 PROCEDURE — 87804 INFLUENZA ASSAY W/OPTIC: CPT | Performed by: NURSE PRACTITIONER

## 2021-09-22 RX ORDER — METHYLPREDNISOLONE 4 MG/1
TABLET ORAL
Qty: 21 EACH | Refills: 0 | Status: SHIPPED | OUTPATIENT
Start: 2021-09-22 | End: 2021-10-12

## 2021-09-22 RX ORDER — CETIRIZINE HYDROCHLORIDE 10 MG/1
TABLET ORAL
COMMUNITY
Start: 2021-09-13 | End: 2022-09-15

## 2021-09-22 RX ORDER — ALBUTEROL SULFATE 90 UG/1
2 AEROSOL, METERED RESPIRATORY (INHALATION) EVERY 4 HOURS PRN
Qty: 6.7 G | Refills: 1 | Status: SHIPPED | OUTPATIENT
Start: 2021-09-22 | End: 2022-06-13 | Stop reason: SDUPTHER

## 2021-09-22 NOTE — PROGRESS NOTES
"  Office Visit      Patient Name: Colin Slater  : 1990   MRN: [id number]   Care Team: Patient Care Team:  Mirna Santo PA as PCP - General (Family Medicine)  Indy Mcdaniel MD as Surgeon (General Surgery)    Chief Complaint  Cough and Headache    Subjective     Subjective      Colin Slater presents to [facility] for cough. Symptoms started 13 days ago. Began with cough and sinus congestion. Now endorses constant headache, cough, chest congestion in the AM, productive yellow sputum, sore throat, fatigue,    Denies shortness of breath, chest pain, diarrhea, abdominal pain, and loss of taste or smell.  He does not smoke.   Girlfriend tested positive for flu.  He has tried OTC alkaseltzer PM and AM.  Has been fully vaccinated for COVID-19 as of 2 weeks ago.  He is also requesting an inhaler due to his exercise-induced asthma. He is getting short of breath with cardiovascular exercise which is relieved by rest and has noted some wheezing with exercise as well.  Distantly diagnosed with exercise-induced asthma but has not had inhaler for 4 years.    Review of Systems   Constitutional: Positive for fatigue. Negative for chills and fever.   HENT: Positive for congestion, postnasal drip and sore throat. Negative for sinus pressure, sneezing, swollen glands and trouble swallowing.    Respiratory: Positive for cough. Negative for shortness of breath and wheezing.    Cardiovascular: Negative for chest pain.   Gastrointestinal: Negative for abdominal pain, diarrhea, nausea and vomiting.   Musculoskeletal: Negative for arthralgias.   Skin: Negative for rash.   Neurological: Positive for headache.   Psychiatric/Behavioral: Negative for sleep disturbance.       Objective     Objective   Vital Signs:   /84   Pulse 91   Temp 97.8 °F (36.6 °C) (Temporal)   Resp 15   Ht 167.6 cm (66\")   Wt 88.5 kg (195 lb 1.9 oz)   SpO2 97%   BMI 31.49 kg/m²     Physical Exam  Vitals and " nursing note reviewed.   Constitutional:       General: He is not in acute distress.     Appearance: Normal appearance. He is not ill-appearing or toxic-appearing.   HENT:      Right Ear: Tympanic membrane and ear canal normal. No middle ear effusion. Tympanic membrane is not bulging.      Left Ear: Ear canal normal. A middle ear effusion is present. Tympanic membrane is retracted. Tympanic membrane is not bulging.      Nose: Nose normal. No congestion or rhinorrhea.      Right Sinus: No maxillary sinus tenderness or frontal sinus tenderness.      Left Sinus: No maxillary sinus tenderness or frontal sinus tenderness.      Mouth/Throat:      Mouth: Mucous membranes are moist.      Pharynx: No posterior oropharyngeal erythema.   Eyes:      Pupils: Pupils are equal, round, and reactive to light.   Cardiovascular:      Rate and Rhythm: Normal rate and regular rhythm.      Heart sounds: Normal heart sounds. No murmur heard.     Pulmonary:      Effort: Pulmonary effort is normal. No respiratory distress.      Breath sounds: Normal breath sounds. No wheezing.      Comments: Coughing incessantly throughout exam  Abdominal:      General: Bowel sounds are normal. There is no distension.      Palpations: Abdomen is soft.      Tenderness: There is no abdominal tenderness.   Musculoskeletal:      Cervical back: Neck supple. No tenderness.   Lymphadenopathy:      Head:      Right side of head: No submental, submandibular or tonsillar adenopathy.      Left side of head: No submental, submandibular or tonsillar adenopathy.      Cervical: No cervical adenopathy.   Skin:     General: Skin is warm and dry.      Findings: No rash.   Neurological:      Mental Status: He is alert.   Psychiatric:         Mood and Affect: Mood normal.         Behavior: Behavior normal.       Assessment / Plan      Assessment/Plan   Problem List Items Addressed This Visit     None      Visit Diagnoses     Acute bronchitis, unspecified organism    -  Primary     Relevant Medications    cetirizine (zyrTEC) 10 MG tablet    albuterol sulfate  (90 Base) MCG/ACT inhaler    Medrol Dosepak ordered, advised to take medication as directed and finish full course of steroids.  Can use OTC Robitussin as needed and continue OTC cold and flu.  Return if symptoms worsen or persist.    Exercise induced bronchospasm        Relevant Medications    cetirizine (zyrTEC) 10 MG tablet    albuterol sulfate  (90 Base) MCG/ACT inhaler    Albuterol inhaler as needed for exercise-induced asthma.  Advised on side effects of the medication and red flag symptoms to return to the clinic with.    Exposure to influenza        Relevant Orders    POCT Influenza A/B (Completed)  Lab Results   Component Value Date    RAPFLUA Negative 09/22/2021                Follow Up   Return if symptoms worsen or fail to improve.  Patient was given instructions and counseling regarding his condition or for health maintenance advice. Please see specific information pulled into the AVS if appropriate.     DEUCE Graff  [facility]

## 2021-10-04 ENCOUNTER — OFFICE VISIT (OUTPATIENT)
Dept: INTERNAL MEDICINE | Facility: CLINIC | Age: 31
End: 2021-10-04

## 2021-10-04 VITALS
DIASTOLIC BLOOD PRESSURE: 82 MMHG | BODY MASS INDEX: 31.51 KG/M2 | SYSTOLIC BLOOD PRESSURE: 130 MMHG | HEART RATE: 72 BPM | HEIGHT: 66 IN | RESPIRATION RATE: 15 BRPM | TEMPERATURE: 97.8 F | OXYGEN SATURATION: 99 %

## 2021-10-04 DIAGNOSIS — J01.00 ACUTE NON-RECURRENT MAXILLARY SINUSITIS: Primary | ICD-10-CM

## 2021-10-04 DIAGNOSIS — R05.9 COUGH: ICD-10-CM

## 2021-10-04 PROCEDURE — 99213 OFFICE O/P EST LOW 20 MIN: CPT | Performed by: PHYSICIAN ASSISTANT

## 2021-10-04 RX ORDER — CEFDINIR 300 MG/1
300 CAPSULE ORAL 2 TIMES DAILY
Qty: 20 CAPSULE | Refills: 0 | Status: SHIPPED | OUTPATIENT
Start: 2021-10-04 | End: 2021-10-12

## 2021-10-04 NOTE — PROGRESS NOTES
"     Follow Up Office Visit      Patient Name: Colin Slater  : 1990   MRN: [id number]     Chief Complaint:    Chief Complaint   Patient presents with   • Cough       History of Present Illness: Colin Slater is a 31 y.o. male who is here today for follow up of cough.  He reports that he was very sick for around 1 week then assisted better for the last 3 weeks without any further improvement. 2 weeks ago he was prescribed a medrol dosepak which may have helped headache but did not help persistent cough, postnasal drip, chest pressure and sinus pressure. When he wakes each morning he spends around 10 minutes coughing up yellow sputum.  He denies SOA, loss of taste or smell or fever.  Exercise does not worsen symptoms but he has been more tired after exercise.  He is taking Zyrtec daily. He previously tried Nyquil/Dayquil, Mucinex and AlkaSeltzer without any success.  He began using Nasacort around 10 days ago which has made his nose feel worse.         Subjective      I have reviewed and the following portions of the patient's history were updated as appropriate: past family history, past medical history, past social history, past surgical history and problem list.      Current Outpatient Medications:   •  albuterol sulfate  (90 Base) MCG/ACT inhaler, Inhale 2 puffs Every 4 (Four) Hours As Needed for Wheezing or Shortness of Air., Disp: 6.7 g, Rfl: 1  •  cefdinir (OMNICEF) 300 MG capsule, Take 1 capsule by mouth 2 (Two) Times a Day for 10 days., Disp: 20 capsule, Rfl: 0  •  cetirizine (zyrTEC) 10 MG tablet, , Disp: , Rfl:   •  methylPREDNISolone (MEDROL) 4 MG dose pack, Take as directed on package instructions., Disp: 21 each, Rfl: 0  •  Vyvanse 10 MG capsule, , Disp: , Rfl:     No Known Allergies    Objective     Physical Exam:  Vital Signs:   Vitals:    10/04/21 1738   BP: 130/82   Pulse: 72   Resp: 15   Temp: 97.8 °F (36.6 °C)   SpO2: 99%   Height: 167.6 cm (65.98\")     Body mass index is 31.51 " kg/m².    Physical Exam  Vitals and nursing note reviewed.   Constitutional:       General: He is not in acute distress.     Appearance: Normal appearance. He is well-developed. He is not ill-appearing, toxic-appearing or diaphoretic.   HENT:      Head: Normocephalic and atraumatic.      Comments: Maxillary sinus tenderness bilaterally     Right Ear: Ear canal and external ear normal. There is no impacted cerumen.      Left Ear: Ear canal and external ear normal. There is no impacted cerumen.      Ears:      Comments: Mild bilateral TM effusions.     Nose:      Comments: Erythematous and boggy nasal turbinates bilaterally     Mouth/Throat:      Mouth: Mucous membranes are moist.      Pharynx: No oropharyngeal exudate or posterior oropharyngeal erythema.      Comments: White postnasal drip present  Eyes:      General: No scleral icterus.        Right eye: No discharge.         Left eye: No discharge.      Extraocular Movements: Extraocular movements intact.      Conjunctiva/sclera: Conjunctivae normal.      Pupils: Pupils are equal, round, and reactive to light.   Cardiovascular:      Rate and Rhythm: Normal rate and regular rhythm.      Heart sounds: Normal heart sounds. No murmur heard.   No friction rub. No gallop.    Pulmonary:      Effort: Pulmonary effort is normal. No respiratory distress.      Breath sounds: Normal breath sounds. No stridor. No wheezing, rhonchi or rales.   Chest:      Chest wall: No tenderness.   Abdominal:      General: Bowel sounds are normal.      Palpations: Abdomen is soft.      Tenderness: There is no abdominal tenderness.   Musculoskeletal:         General: No tenderness or deformity. Normal range of motion.      Cervical back: Normal range of motion and neck supple. No rigidity or tenderness.      Right lower leg: No edema.      Left lower leg: No edema.   Lymphadenopathy:      Cervical: No cervical adenopathy.   Skin:     General: Skin is warm and dry.      Capillary Refill:  Capillary refill takes less than 2 seconds.      Coloration: Skin is not jaundiced or pale.      Findings: No erythema or rash.   Neurological:      Mental Status: He is alert and oriented to person, place, and time.      Cranial Nerves: No cranial nerve deficit.      Sensory: No sensory deficit.      Motor: No abnormal muscle tone.      Coordination: Coordination normal.      Deep Tendon Reflexes: Reflexes normal.   Psychiatric:         Mood and Affect: Mood normal.         Behavior: Behavior normal.         Thought Content: Thought content normal.         Judgment: Judgment normal.         Common labs    Common Labsle 5/3/21 5/3/21    1234 1234   Glucose  83   BUN  15   Creatinine  0.98   eGFR Non  Am  90   eGFR African Am  110   Sodium  140   Potassium  5.1   Chloride  104   Calcium  10.2   Total Protein  7.0   Albumin  4.80   Total Bilirubin  0.3   Alkaline Phosphatase  96   AST (SGOT)  17   ALT (SGPT)  27   WBC 6.80    Hemoglobin 17.1    Hematocrit 51.5 (A)    Platelets 231    (A) Abnormal value       Comments are available for some flowsheets but are not being displayed.               Assessment / Plan      Assessment/Plan:   Diagnoses and all orders for this visit:    1. Acute non-recurrent maxillary sinusitis (Primary)  -     cefdinir (OMNICEF) 300 MG capsule; Take 1 capsule by mouth 2 (Two) Times a Day for 10 days.  Dispense: 20 capsule; Refill: 0    2. Cough  -     cefdinir (OMNICEF) 300 MG capsule; Take 1 capsule by mouth 2 (Two) Times a Day for 10 days.  Dispense: 20 capsule; Refill: 0       Begin using [place] pot with distilled water at bedtime and each morning to clear nasal congestion.  Continue Zyrtec daily.  Sip warm fluids to ease cough.          Follow Up:   Return if symptoms worsen or fail to improve.    Patient was given instructions and counseling regarding his condition or for health maintenance advice. Please see specific information pulled into the AVS if appropriate.     Mirna  MACK Santo  Primary Care [address] [place]     Please note that portions of this note may have been completed with a voice recognition program. Efforts were made to edit the dictations, but occasionally words are mistranscribed.

## 2021-10-11 ENCOUNTER — TELEPHONE (OUTPATIENT)
Dept: INTERNAL MEDICINE | Facility: CLINIC | Age: 31
End: 2021-10-11

## 2021-10-11 NOTE — TELEPHONE ENCOUNTER
Caller: Colin Slater    Relationship to patient: Self    Best call back number:     Chief complaint: COUGH HEADACHE SINUS PRESSURE    Type of visit: OFFICE VISIT    Requested date: NA      Additional notes: PATIENT REQUESTED TOMORROW FOR AN APPT WITH [facility] AND SHE DIDN'T HAVE AN APPTS; I PUT IN WITH DR NEAL

## 2021-10-12 ENCOUNTER — OFFICE VISIT (OUTPATIENT)
Dept: INTERNAL MEDICINE | Facility: CLINIC | Age: 31
End: 2021-10-12

## 2021-10-12 VITALS
TEMPERATURE: 98 F | OXYGEN SATURATION: 98 % | HEART RATE: 103 BPM | DIASTOLIC BLOOD PRESSURE: 74 MMHG | SYSTOLIC BLOOD PRESSURE: 135 MMHG

## 2021-10-12 DIAGNOSIS — J01.01 ACUTE RECURRENT MAXILLARY SINUSITIS: ICD-10-CM

## 2021-10-12 DIAGNOSIS — R05.9 COUGH: Primary | ICD-10-CM

## 2021-10-12 PROCEDURE — U0004 COV-19 TEST NON-CDC HGH THRU: HCPCS | Performed by: INTERNAL MEDICINE

## 2021-10-12 PROCEDURE — 99213 OFFICE O/P EST LOW 20 MIN: CPT | Performed by: INTERNAL MEDICINE

## 2021-10-12 RX ORDER — AZITHROMYCIN 250 MG/1
TABLET, FILM COATED ORAL
Qty: 6 TABLET | Refills: 0 | Status: SHIPPED | OUTPATIENT
Start: 2021-10-12 | End: 2022-06-13

## 2021-10-12 NOTE — PATIENT INSTRUCTIONS
MyPlate from USDA    MyPlate is an outline of a general healthy diet based on the 2010 Dietary Guidelines for Americans, from the U.S. Department of Agriculture (USDA). It sets guidelines for how much food you should eat from each food group based on your age, sex, and level of physical activity.  What are tips for following MyPlate?  To follow MyPlate recommendations:  · Eat a wide variety of fruits and vegetables, grains, and protein foods.  · Serve smaller portions and eat less food throughout the day.  · Limit portion sizes to avoid overeating.  · Enjoy your food.  · Get at least 150 minutes of exercise every week. This is about 30 minutes each day, 5 or more days per week.  It can be difficult to have every meal look like MyPlate. Think about MyPlate as eating guidelines for an entire day, rather than each individual meal.  Fruits and vegetables  · Make half of your plate fruits and vegetables.  · Eat many different colors of fruits and vegetables each day.  · For a 2,000 calorie daily food plan, eat:  ? 2½ cups of vegetables every day.  ? 2 cups of fruit every day.  · 1 cup is equal to:  ? 1 cup raw or cooked vegetables.  ? 1 cup raw fruit.  ? 1 medium-sized orange, apple, or banana.  ? 1 cup 100% fruit or vegetable juice.  ? 2 cups raw leafy greens, such as lettuce, spinach, or kale.  ? ½ cup dried fruit.  Grains  · One fourth of your plate should be grains.  · Make at least half of the grains you eat each day whole grains.  · For a 2,000 calorie daily food plan, eat 6 oz of grains every day.  · 1 oz is equal to:  ? 1 slice bread.  ? 1 cup cereal.  ? ½ cup cooked rice, cereal, or pasta.  Protein  · One fourth of your plate should be protein.  · Eat a wide variety of protein foods, including meat, poultry, fish, eggs, beans, nuts, and tofu.  · For a 2,000 calorie daily food plan, eat 5½ oz of protein every day.  · 1 oz is equal to:  ? 1 oz meat, poultry, or fish.  ? ¼ cup cooked beans.  ? 1 egg.  ? ½ oz nuts  or seeds.  ? 1 Tbsp peanut butter.  Dairy  · Drink fat-free or low-fat (1%) milk.  · Eat or drink dairy as a side to meals.  · For a 2,000 calorie daily food plan, eat or drink 3 cups of dairy every day.  · 1 cup is equal to:  ? 1 cup milk, yogurt, cottage cheese, or soy milk (soy beverage).  ? 2 oz processed cheese.  ? 1½ oz natural cheese.  Fats, oils, salt, and sugars  · Only small amounts of oils are recommended.  · Avoid foods that are high in calories and low in nutritional value (empty calories), like foods high in fat or added sugars.  · Choose foods that are low in salt (sodium). Choose foods that have less than 140 milligrams (mg) of sodium per serving.  · Drink water instead of sugary drinks. Drink enough water each day to keep your urine pale yellow.  Where to find support  · Work with your health care provider or a nutrition specialist (dietitian) to develop a customized eating plan that is right for you.  · Download an mar (mobile application) to help you track your daily food intake.  Where to find more information  · Go to ChooseMyPlate.gov for more information.  Summary  · MyPlate is a general guideline for healthy eating from the USDA. It is based on the 2010 Dietary Guidelines for Americans.  · In general, fruits and vegetables should take up ½ of your plate, grains should take up ¼ of your plate, and protein should take up ¼ of your plate.  This information is not intended to replace advice given to you by your health care provider. Make sure you discuss any questions you have with your health care provider.  Document Revised: 05/21/2020 Document Reviewed: 03/19/2018  Elsevier Patient Education © 2021 [employer] Inc.

## 2021-10-12 NOTE — PROGRESS NOTES
Chief Complaint  Nasal Congestion and Sinus Problem    Subjective          Colin Slater presents to [facility]  History of Present Illness  Patient is here complaining of sinus congestion, nasal discharge which he states has been ongoing for 6 weeks, he was seen in the clinic and got prednisone and Omnicef, he also complains of ear pain, he complains of cough, he is vaccinated, he denies any fever    Objective   Vital Signs:   /74   Pulse 103   Temp 98 °F (36.7 °C)   SpO2 98%     Physical Exam  Vitals and nursing note reviewed.   Constitutional:       General: He is not in acute distress.     Appearance: Normal appearance. He is not diaphoretic.   HENT:      Head: Normocephalic and atraumatic.      Right Ear: External ear normal.      Left Ear: External ear normal.      Nose: Rhinorrhea present.   Eyes:      Extraocular Movements: Extraocular movements intact.      Conjunctiva/sclera: Conjunctivae normal.   Neck:      Trachea: Trachea normal.   Cardiovascular:      Rate and Rhythm: Normal rate and regular rhythm.      Heart sounds: Normal heart sounds.   Pulmonary:      Effort: Pulmonary effort is normal. No respiratory distress.   Abdominal:      General: Abdomen is flat.   Musculoskeletal:      Cervical back: Neck supple.      Comments: Moves all limbs   Skin:     General: Skin is warm and dry.      Findings: No erythema.   Neurological:      Mental Status: He is alert and oriented to person, place, and time.      Comments: No gross motor or sensory deficits        Result Review :     Common labs    Common Labsle 5/3/21 5/3/21    1234 1234   Glucose  83   BUN  15   Creatinine  0.98   eGFR Non  Am  90   eGFR African Am  110   Sodium  140   Potassium  5.1   Chloride  104   Calcium  10.2   Total Protein  7.0   Albumin  4.80   Total Bilirubin  0.3   Alkaline Phosphatase  96   AST (SGOT)  17   ALT (SGPT)  27   WBC 6.80    Hemoglobin 17.1    Hematocrit 51.5 (A)     Platelets 231    (A) Abnormal value       Comments are available for some flowsheets but are not being displayed.                     Assessment and Plan    Diagnoses and all orders for this visit:    1. Cough (Primary)  -     COVID-19 PCR, [facility], NP SWAB IN [facility]SP[facility] MEDIA/ORAL SWISH 24-30 HR TAT - Swab, Nasopharynx  -     azithromycin (Zithromax Z-Donnie) 250 MG tablet; Take 2 tablets the first day, then 1 tablet daily for 4 days.  Dispense: 6 tablet; Refill: 0    2. Acute recurrent maxillary sinusitis  -     azithromycin (Zithromax Z-Donnie) 250 MG tablet; Take 2 tablets the first day, then 1 tablet daily for 4 days.  Dispense: 6 tablet; Refill: 0  -     Ambulatory Referral to ENT (Otolaryngology)    Plan:  1.acute maxillary sinusitis: We'll start azithromycin and refer patient to ENT  2.  Cough: Most secondary to postnasal drip, will get Covid test  I spent 20 minutes caring for Colin on this date of service. This time includes time spent by me in the following activities:preparing for the visit, reviewing tests, performing a medically appropriate examination and/or evaluation , counseling and educating the patient/family/caregiver, ordering medications, tests, or procedures and documenting information in the medical record  Follow Up   Patient was given instructions and counseling regarding his condition or for health maintenance advice. Please see specific information pulled into the AVS if appropriate.

## 2021-10-13 LAB — SARS-COV-2 RNA NOSE QL NAA+PROBE: NOT DETECTED

## 2022-06-13 ENCOUNTER — OFFICE VISIT (OUTPATIENT)
Dept: INTERNAL MEDICINE | Facility: CLINIC | Age: 32
End: 2022-06-13

## 2022-06-13 VITALS
BODY MASS INDEX: 29.98 KG/M2 | DIASTOLIC BLOOD PRESSURE: 71 MMHG | HEART RATE: 75 BPM | SYSTOLIC BLOOD PRESSURE: 129 MMHG | OXYGEN SATURATION: 97 % | TEMPERATURE: 98.2 F | RESPIRATION RATE: 16 BRPM | WEIGHT: 191 LBS | HEIGHT: 67 IN

## 2022-06-13 DIAGNOSIS — R21 GROIN RASH: ICD-10-CM

## 2022-06-13 DIAGNOSIS — M25.50 ARTHRALGIA, UNSPECIFIED JOINT: ICD-10-CM

## 2022-06-13 DIAGNOSIS — Z11.3 ROUTINE SCREENING FOR STI (SEXUALLY TRANSMITTED INFECTION): ICD-10-CM

## 2022-06-13 DIAGNOSIS — B35.6 TINEA CRURIS: Primary | ICD-10-CM

## 2022-06-13 DIAGNOSIS — J30.1 SEASONAL ALLERGIC RHINITIS DUE TO POLLEN: ICD-10-CM

## 2022-06-13 DIAGNOSIS — J45.990 EXERCISE INDUCED BRONCHOSPASM: ICD-10-CM

## 2022-06-13 DIAGNOSIS — Z23 NEED FOR DIPHTHERIA-TETANUS-PERTUSSIS (TDAP) VACCINE: ICD-10-CM

## 2022-06-13 PROCEDURE — 99214 OFFICE O/P EST MOD 30 MIN: CPT | Performed by: NURSE PRACTITIONER

## 2022-06-13 PROCEDURE — 90715 TDAP VACCINE 7 YRS/> IM: CPT | Performed by: NURSE PRACTITIONER

## 2022-06-13 PROCEDURE — 90471 IMMUNIZATION ADMIN: CPT | Performed by: NURSE PRACTITIONER

## 2022-06-13 RX ORDER — FLUTICASONE PROPIONATE 50 MCG
2 SPRAY, SUSPENSION (ML) NASAL DAILY
Qty: 16 G | Refills: 5 | Status: SHIPPED | OUTPATIENT
Start: 2022-06-13

## 2022-06-13 RX ORDER — KETOCONAZOLE 20 MG/G
1 CREAM TOPICAL DAILY
Qty: 14 G | Refills: 0 | Status: SHIPPED | OUTPATIENT
Start: 2022-06-13 | End: 2022-06-27

## 2022-06-13 RX ORDER — ALBUTEROL SULFATE 90 UG/1
2 AEROSOL, METERED RESPIRATORY (INHALATION) EVERY 4 HOURS PRN
Qty: 6.7 G | Refills: 1 | Status: SHIPPED | OUTPATIENT
Start: 2022-06-13 | End: 2023-04-06

## 2022-06-13 RX ORDER — LISDEXAMFETAMINE DIMESYLATE 20 MG/1
CAPSULE ORAL
COMMUNITY
Start: 2022-05-24

## 2022-06-13 NOTE — PROGRESS NOTES
Chief Complaint   Patient presents with   • Herpes Zoster     Possible shingles,rash on L upper, inner thigh, no blisters, not painful, or itchy, discuss meds for arthritis, has been on maloxicam in the past     Subjective   Colin Slater is a 32 y.o. male.     History of Present Illness     Patient presents with rash.  Location is the bilateral inner thighs.  Onset 1 week ago.  Patient states he had shingles in the past and he is unsure if this is the same thing.  He denies any blisters, pain, itching, tingling, pins-and-needles.  Did not have any tingling or pain before rash appeared.  Does shave within that area.  Has had multiple sexual partners in the past and would like a full STI screening.  Denies any history of STIs.  Denies any penile discharge, dysuria, penile pain, any other rashes.    Patient is a  has a long history of chronic joint pain due to wear-and-tear on the body.  He used to take meloxicam daily but it upset his stomach.  He does not wish to take any NSAID daily due to long-term risks.  Asking if there is anything he can take for his joint pain because he is currently doing 200 to 400 mg of ibuprofen every single day and he knows this is not safe.  States that his ankles are the worst, and other joints flare occasionally.  States he has been checked for rheumatoid arthritis before and was told that it was due to chronic wear and tear of his body and not anything autoimmune.    Patient is requesting a refill for his Flonase for allergic rhinitis.    He is due his tetanus vaccination.      The following portions of the patient's history were reviewed and updated as appropriate: allergies, current medications, past family history, past medical history, past social history, past surgical history and problem list.    Review of Systems   Constitutional: Negative.    HENT: Negative.    Eyes: Negative.    Respiratory: Negative.    Cardiovascular: Negative.    Endocrine: Negative.   "  Genitourinary: Negative.    Musculoskeletal: Positive for arthralgias. Negative for gait problem and joint swelling.   Skin: Positive for rash.   Neurological: Negative.    Psychiatric/Behavioral: Negative.    All other systems reviewed and are negative.      Objective   /71   Pulse 75   Temp 98.2 °F (36.8 °C) (Temporal)   Resp 16   Ht 170.2 cm (67\")   Wt 86.6 kg (191 lb)   SpO2 97%   BMI 29.91 kg/m²   Body mass index is 29.91 kg/m².  Physical Exam  Vitals and nursing note reviewed. Exam conducted with a chaperone present.   Constitutional:       Appearance: Normal appearance.   HENT:      Head: Normocephalic and atraumatic.   Eyes:      Extraocular Movements: Extraocular movements intact.      Pupils: Pupils are equal, round, and reactive to light.   Cardiovascular:      Rate and Rhythm: Normal rate and regular rhythm.   Pulmonary:      Effort: Pulmonary effort is normal.      Breath sounds: Normal breath sounds.   Musculoskeletal:         General: Normal range of motion.   Skin:     Findings: Rash (Bilateral groin with erythema, no warmth, streaking, drainage) present.   Neurological:      General: No focal deficit present.      Mental Status: He is alert and oriented to person, place, and time.   Psychiatric:         Mood and Affect: Mood normal.         Behavior: Behavior normal.         Thought Content: Thought content normal.         Judgment: Judgment normal.         Assessment & Plan   Colin Slater is here today and the following problems have been addressed:      Diagnoses and all orders for this visit:    1. Tinea cruris (Primary)  -     ketoconazole (NIZORAL) 2 % cream; Apply 1 application topically to the appropriate area as directed Daily for 14 days.  Dispense: 14 g; Refill: 0  - Appears to be fungal in nature.  Recommend washing area daily with Selsun Blue shampoo.  Keep area dry and wear loosefitting underwear.  Apply antifungal cream once a day for least 7 to 14 days.  Follow-up if " rash worsens or does not improve.  Avoid shaving area or applying any scented lotions or soaps.    2. Groin rash  -     Herpes Simplex Virus Culture - Swab, Thigh, Left  - Patient concerned for HSV.  Swab completed of area.  Discussed how I was not convinced that rash is herpetic due to appearance.    3. Routine screening for STI (sexually transmitted infection)  -     Chlamydia trachomatis, Neisseria gonorrhoeae, Trichomonas vaginalis, PCR - Swab, Urine, Clean Catch  -     RPR  -     HSV 1 & 2 - Specific Antibody, IgG  -     HIV-1/O/2 Ag/Ab w Reflex  -     Hepatitis C antibody  -     Hepatitis B Surface Antibody  - Patient has a history of multiple sexual partners, women.  STI lab and urine panel complete.    4. Arthralgia, unspecified joint  -     Diclofenac Sodium (VOLTAREN) 1 % gel gel; Apply 4 g topically to the appropriate area as directed 4 (Four) Times a Day As Needed (joint pain).  Dispense: 350 g; Refill: 2  - Uncontrolled.  Apply diclofenac gel to joints as needed for pain.  This is a safe and effective medication to use for joints that will not have an effect on GI tract.  Follow-up if symptoms worsen and/or continue.    5. Seasonal allergic rhinitis due to pollen  -     fluticasone (FLONASE) 50 MCG/ACT nasal spray; 2 sprays into the nostril(s) as directed by provider Daily.  Dispense: 16 g; Refill: 5  - Controlled.  Refill Flonase sent for seasonal allergic rhinitis.  Avoid triggers.    6. Need for diphtheria-tetanus-pertussis (Tdap) vaccine  -     Tdap Vaccine Greater Than or Equal To 6yo IM    7. Exercise induced bronchospasm  -     Controlled.  Refill sent for albuterol sulfate  (90 Base) MCG/ACT inhaler; Inhale 2 puffs Every 4 (Four) Hours As Needed for Wheezing or Shortness of Air.  Dispense: 6.7 g; Refill: 1        Follow Up   Return for Schedule annual physical next available.  Patient was given instructions and counseling regarding his condition or for health maintenance advice. Please  see specific information pulled into the AVS if appropriate.     Flor TRIPATHI  [facility]

## 2022-06-15 LAB
HBV SURFACE AB SER QL: REACTIVE
HCV AB S/CO SERPL IA: 0.2 S/CO RATIO (ref 0–0.9)
HIV 1+2 AB+HIV1 P24 AG SERPL QL IA: NON REACTIVE
HSV1 IGG SER IA-ACNC: 38.7 INDEX (ref 0–0.9)
HSV2 IGG SER IA-ACNC: <0.91 INDEX (ref 0–0.9)
RPR SER QL: NON REACTIVE

## 2022-06-16 DIAGNOSIS — R76.8 HEPATITIS B ANTIBODY POSITIVE: Primary | ICD-10-CM

## 2022-06-16 LAB
C TRACH RRNA SPEC QL NAA+PROBE: NEGATIVE
HSV SPEC CULT: NORMAL
N GONORRHOEA RRNA SPEC QL NAA+PROBE: NEGATIVE
REQUEST PROBLEM: NORMAL
T VAGINALIS RRNA SPEC QL NAA+PROBE: NEGATIVE

## 2022-06-16 NOTE — PROGRESS NOTES
HSV-1 is positive, meaning you have the herpes simplex virus that causes cold sores around your mouth.  The HSV-2 that causes genital ulcerations was negative.  Hepatitis B surface antibody is reactive meaning that you are immune to hep B either by vaccination or past infection.  HIV, hepatitis C, and RPR which test for syphilis are all negative.  Based on these results I do not think your rash is related to a sexually transmitted infection.

## 2022-06-24 LAB
HSV1 DNA SPEC QL NAA+PROBE: NEGATIVE
HSV2 DNA SPEC QL NAA+PROBE: NEGATIVE
WRITTEN AUTHORIZATION: NORMAL

## 2022-09-15 ENCOUNTER — OFFICE VISIT (OUTPATIENT)
Dept: INTERNAL MEDICINE | Facility: CLINIC | Age: 32
End: 2022-09-15

## 2022-09-15 DIAGNOSIS — R41.89 BRAIN FOG: ICD-10-CM

## 2022-09-15 DIAGNOSIS — R41.3 SHORT-TERM MEMORY LOSS: ICD-10-CM

## 2022-09-15 DIAGNOSIS — G47.33 OSA (OBSTRUCTIVE SLEEP APNEA): ICD-10-CM

## 2022-09-15 DIAGNOSIS — Z87.820 HISTORY OF MULTIPLE CONCUSSIONS: ICD-10-CM

## 2022-09-15 DIAGNOSIS — R29.898 FINE MOTOR IMPAIRMENT: ICD-10-CM

## 2022-09-15 DIAGNOSIS — R29.818 FINE MOTOR IMPAIRMENT: ICD-10-CM

## 2022-09-15 DIAGNOSIS — J34.2 DEVIATED SEPTUM: ICD-10-CM

## 2022-09-15 DIAGNOSIS — R27.8 CLUMSINESS: ICD-10-CM

## 2022-09-15 DIAGNOSIS — R53.82 CHRONIC FATIGUE: Primary | ICD-10-CM

## 2022-09-15 PROCEDURE — 99214 OFFICE O/P EST MOD 30 MIN: CPT | Performed by: NURSE PRACTITIONER

## 2022-09-15 NOTE — PROGRESS NOTES
"Chief Complaint   Patient presents with   • Fatigue     Affecting ADL's fine motor functions; short-term memory loss, \"fogginess,\" \"clumsiness\" increasing X 3-4; discuss ADHD med     Subjective   Colin Slater is a 32 y.o. male.     History of Present Illness   Patient presents for chronic fatigue, fine motor function difficulty, short-term memory loss, fogginess, clumsiness.  This has been going on for the past 3 to 4 months.  The fatigue has actually been going on for years.  Provides examples of fine motor deficits such as brushing his teeth, using his thumb to look on his phone, tying his shoes, holding a coffee mug.  He reports feeling intoxicated and having issues with depth perception.  He will reach for something and mislead of how far he needs to reach.  The fine motor skills are more difficult in the morning the first 1 to 1-1/2 hours after he awakes and then goes away.  Having short-term memory issues.  He works in the [employer] and cannot remember people's ranks.  He has been in the [employer] for 9 years and states that he should be able to know and remember these but he has to think really hard sometimes and second-guess himself.  Remembers things in another language at times but cannot think of the word in English.  He feels like words take forever to come to him.  Forgets passwords, pin numbers, and quantity of things a lot.  Switched to day shift due to thinking he was not getting enough sleep and that it would help him.  He has been on day shift for 3 months and he is only noticed a slight improvement in the fatigue and symptoms.  Had sleep study done before but states he was never told the results.  He had this done with [facility].  He is in the  and has a sports background and admits to having many concussions in the past and this concerns him.  He denies any headache, visual changes, dizziness, lightheadedness, chest pain, shortness of breath.  He states that his perception of pain due to " "his ADHD is decreased, so a lot of the times when he was having an ear infection, for example, he did not have much pain with it.  He takes Vyvanse 20 mg for ADHD.  He has been on this medication since 2016.  States that he has not had the symptoms until the past few months on the medication and does not feel like the medication is the cause of the symptoms.    The following portions of the patient's history were reviewed and updated as appropriate: allergies, current medications, past family history, past medical history, past social history, past surgical history and problem list.    Review of Systems   Constitutional: Positive for fatigue (chronic).   Eyes: Negative for photophobia and visual disturbance.   Respiratory: Negative.    Cardiovascular: Negative.    Gastrointestinal: Negative.    Musculoskeletal: Negative.    Neurological: Positive for speech difficulty. Negative for dizziness, tremors, seizures, syncope, facial asymmetry, weakness, light-headedness, numbness and headaches.   Psychiatric/Behavioral: Positive for confusion, decreased concentration and sleep disturbance. Negative for dysphoric mood. The patient is not nervous/anxious.        Objective   /88   Pulse 74   Temp 98.2 °F (36.8 °C) (Temporal)   Ht 167.6 cm (66\")   Wt 88.4 kg (194 lb 12.8 oz)   SpO2 99%   BMI 31.44 kg/m²   Body mass index is 31.44 kg/m².  Physical Exam  Constitutional:       General: He is not in acute distress.     Appearance: Normal appearance. He is not ill-appearing, toxic-appearing or diaphoretic.   HENT:      Head: Normocephalic and atraumatic.   Eyes:      Extraocular Movements: Extraocular movements intact.   Cardiovascular:      Rate and Rhythm: Normal rate and regular rhythm.   Pulmonary:      Effort: Pulmonary effort is normal.      Breath sounds: Normal breath sounds.   Musculoskeletal:         General: Normal range of motion.      Cervical back: Normal range of motion.   Neurological:      General: No " focal deficit present.      Mental Status: He is alert and oriented to person, place, and time.      Cranial Nerves: Cranial nerves are intact. No cranial nerve deficit.      Sensory: Sensation is intact. No sensory deficit.      Motor: Motor function is intact. No weakness, tremor or abnormal muscle tone.      Coordination: Coordination is intact. Romberg sign negative. Coordination normal.      Gait: Gait is intact. Gait normal.   Psychiatric:         Mood and Affect: Mood normal.         Behavior: Behavior normal.         Thought Content: Thought content normal.         Judgment: Judgment normal.         Assessment & Plan   Colin Slater is here today and the following problems have been addressed:      Diagnoses and all orders for this visit:    1. Chronic fatigue (Primary)  -     CBC and Differential  -     Comprehensive metabolic panel  -     Iron  -     Testosterone, free, total  -     Vitamin B12  -     Vitamin D 1,25 dihydroxy  -     TSH Rfx On Abnormal To Free T4  -     Ambulatory Referral to Sleep Medicine    2. History of multiple concussions  -     MRI Brain Without Contrast    3. Fine motor impairment  -     CBC and Differential  -     Comprehensive metabolic panel  -     Iron  -     Testosterone, free, total  -     Vitamin B12  -     Vitamin D 1,25 dihydroxy  -     TSH Rfx On Abnormal To Free T4  -     MRI Brain Without Contrast    4. Short-term memory loss  -     CBC and Differential  -     Comprehensive metabolic panel  -     Iron  -     Testosterone, free, total  -     Vitamin B12  -     Vitamin D 1,25 dihydroxy  -     TSH Rfx On Abnormal To Free T4  -     MRI Brain Without Contrast    5. Brain fog  -     CBC and Differential  -     Comprehensive metabolic panel  -     Iron  -     Testosterone, free, total  -     Vitamin B12  -     Vitamin D 1,25 dihydroxy  -     TSH Rfx On Abnormal To Free T4  -     MRI Brain Without Contrast    6. Clumsiness  -     CBC and Differential  -     Comprehensive  metabolic panel  -     Iron  -     Testosterone, free, total  -     Vitamin B12  -     Vitamin D 1,25 dihydroxy  -     TSH Rfx On Abnormal To Free T4  -     MRI Brain Without Contrast    7. PRINCESS (obstructive sleep apnea)  -     Ambulatory Referral to Sleep Medicine    8. Deviated septum    Physical exam without abnormal findings.  Reviewed previous records from sleep study on 8/6/2020.  Patient was diagnosed with mild sleep apnea with 11 events per hour and oxygen saturation dropped to 86%.  Patient states he was never aware of these results.  Trial of CPAP was recommended.  We will do referral to sleep medicine to determine if patient needs to repeat sleep study since it has been 2 years.  Many of the symptoms he is having could be related to the inadequate sleep, especially if hypoxic during sleep.  Patient states he was also set up for a nasal surgery due to a deviated septum but did not complete this. Discussed with him that this could be a contributing factor.  Patient is going to call ENT and discuss the surgery.  Lab panel ordered as well as MRI of brain.  Some of the symptoms he has is concerning, and with a history of multiple concussions I feel as though an MRI would be necessary to rule out any abnormalities as well as labs that could be contributing to his symptoms.       Follow Up   Return for Annual.  Patient was given instructions and counseling regarding his condition or for health maintenance advice. Please see specific information pulled into the AVS if appropriate.     Flor TRIPATHI  [facility] [place]

## 2022-09-16 VITALS
HEIGHT: 66 IN | OXYGEN SATURATION: 99 % | WEIGHT: 194.8 LBS | DIASTOLIC BLOOD PRESSURE: 88 MMHG | SYSTOLIC BLOOD PRESSURE: 132 MMHG | HEART RATE: 74 BPM | BODY MASS INDEX: 31.31 KG/M2 | TEMPERATURE: 98.2 F

## 2022-09-17 LAB
1,25(OH)2D SERPL-MCNC: 72 PG/ML (ref 24.8–81.5)
ALBUMIN SERPL-MCNC: 4.6 G/DL (ref 3.5–5.2)
ALBUMIN/GLOB SERPL: 2.4 G/DL
ALP SERPL-CCNC: 97 U/L (ref 39–117)
ALT SERPL-CCNC: 20 U/L (ref 1–41)
AST SERPL-CCNC: 17 U/L (ref 1–40)
BASOPHILS # BLD AUTO: 0.02 10*3/MM3 (ref 0–0.2)
BASOPHILS NFR BLD AUTO: 0.3 % (ref 0–1.5)
BILIRUB SERPL-MCNC: 0.3 MG/DL (ref 0–1.2)
BUN SERPL-MCNC: 14 MG/DL (ref 6–20)
BUN/CREAT SERPL: 14.1 (ref 7–25)
CALCIUM SERPL-MCNC: 9.6 MG/DL (ref 8.6–10.5)
CHLORIDE SERPL-SCNC: 105 MMOL/L (ref 98–107)
CO2 SERPL-SCNC: 23.3 MMOL/L (ref 22–29)
CREAT SERPL-MCNC: 0.99 MG/DL (ref 0.76–1.27)
EGFRCR SERPLBLD CKD-EPI 2021: 105.1 ML/MIN/1.73
EOSINOPHIL # BLD AUTO: 0.11 10*3/MM3 (ref 0–0.4)
EOSINOPHIL NFR BLD AUTO: 1.6 % (ref 0.3–6.2)
ERYTHROCYTE [DISTWIDTH] IN BLOOD BY AUTOMATED COUNT: 12.6 % (ref 12.3–15.4)
GLOBULIN SER CALC-MCNC: 1.9 GM/DL
GLUCOSE SERPL-MCNC: 81 MG/DL (ref 65–99)
HCT VFR BLD AUTO: 45.1 % (ref 37.5–51)
HGB BLD-MCNC: 15.2 G/DL (ref 13–17.7)
IMM GRANULOCYTES # BLD AUTO: 0.01 10*3/MM3 (ref 0–0.05)
IMM GRANULOCYTES NFR BLD AUTO: 0.1 % (ref 0–0.5)
IRON SERPL-MCNC: 102 MCG/DL (ref 59–158)
LYMPHOCYTES # BLD AUTO: 2.31 10*3/MM3 (ref 0.7–3.1)
LYMPHOCYTES NFR BLD AUTO: 33.2 % (ref 19.6–45.3)
MCH RBC QN AUTO: 30.7 PG (ref 26.6–33)
MCHC RBC AUTO-ENTMCNC: 33.7 G/DL (ref 31.5–35.7)
MCV RBC AUTO: 91.1 FL (ref 79–97)
MONOCYTES # BLD AUTO: 0.75 10*3/MM3 (ref 0.1–0.9)
MONOCYTES NFR BLD AUTO: 10.8 % (ref 5–12)
NEUTROPHILS # BLD AUTO: 3.76 10*3/MM3 (ref 1.7–7)
NEUTROPHILS NFR BLD AUTO: 54 % (ref 42.7–76)
NRBC BLD AUTO-RTO: 0 /100 WBC (ref 0–0.2)
PLATELET # BLD AUTO: 212 10*3/MM3 (ref 140–450)
POTASSIUM SERPL-SCNC: 4.6 MMOL/L (ref 3.5–5.2)
PROT SERPL-MCNC: 6.5 G/DL (ref 6–8.5)
RBC # BLD AUTO: 4.95 10*6/MM3 (ref 4.14–5.8)
SODIUM SERPL-SCNC: 139 MMOL/L (ref 136–145)
TESTOST FREE SERPL-MCNC: 7.3 PG/ML (ref 8.7–25.1)
TESTOST SERPL-MCNC: 418 NG/DL (ref 264–916)
TSH SERPL DL<=0.005 MIU/L-ACNC: 2.59 UIU/ML (ref 0.27–4.2)
VIT B12 SERPL-MCNC: 624 PG/ML (ref 211–946)
WBC # BLD AUTO: 6.96 10*3/MM3 (ref 3.4–10.8)

## 2022-09-19 ENCOUNTER — TELEPHONE (OUTPATIENT)
Dept: INTERNAL MEDICINE | Facility: CLINIC | Age: 32
End: 2022-09-19

## 2022-09-19 DIAGNOSIS — R79.89 DECREASED FREE TESTOSTERONE LEVEL IN MALE: Primary | ICD-10-CM

## 2022-09-19 NOTE — PROGRESS NOTES
Your total testosterone is normal but your free testosterone in your blood stream came back low- if wanting to discuss treatment options for this, you will have to see a urologist. If interested,  let me know and I can place referral. This can cause fatigue.

## 2022-10-20 ENCOUNTER — HOSPITAL ENCOUNTER (OUTPATIENT)
Dept: MRI IMAGING | Facility: HOSPITAL | Age: 32
Discharge: HOME | End: 2022-10-20
Admitting: NURSE PRACTITIONER

## 2022-10-20 PROCEDURE — 70551 MRI BRAIN STEM W/O DYE: CPT

## 2022-10-28 ENCOUNTER — OFFICE VISIT (OUTPATIENT)
Dept: SLEEP MEDICINE | Facility: CLINIC | Age: 32
End: 2022-10-28

## 2022-10-28 VITALS
HEART RATE: 85 BPM | WEIGHT: 194 LBS | DIASTOLIC BLOOD PRESSURE: 77 MMHG | OXYGEN SATURATION: 97 % | BODY MASS INDEX: 31.18 KG/M2 | SYSTOLIC BLOOD PRESSURE: 138 MMHG | HEIGHT: 66 IN

## 2022-10-28 DIAGNOSIS — R29.818 SUSPECTED SLEEP APNEA: ICD-10-CM

## 2022-10-28 DIAGNOSIS — G47.51 CONFUSIONAL AROUSALS: ICD-10-CM

## 2022-10-28 DIAGNOSIS — F51.04 PSYCHOPHYSIOLOGICAL INSOMNIA: ICD-10-CM

## 2022-10-28 DIAGNOSIS — R06.83 SNORING: Primary | ICD-10-CM

## 2022-10-28 DIAGNOSIS — G47.19 EXCESSIVE DAYTIME SLEEPINESS: ICD-10-CM

## 2022-10-28 PROCEDURE — 99214 OFFICE O/P EST MOD 30 MIN: CPT | Performed by: NURSE PRACTITIONER

## 2022-10-28 RX ORDER — DEXTROAMPHETAMINE SACCHARATE, AMPHETAMINE ASPARTATE, DEXTROAMPHETAMINE SULFATE AND AMPHETAMINE SULFATE 1.25; 1.25; 1.25; 1.25 MG/1; MG/1; MG/1; MG/1
TABLET ORAL
COMMUNITY
Start: 2022-09-22

## 2022-10-28 RX ORDER — ZOLPIDEM TARTRATE 5 MG/1
5 TABLET ORAL NIGHTLY PRN
Qty: 2 TABLET | Refills: 0 | Status: SHIPPED | OUTPATIENT
Start: 2022-10-28 | End: 2023-04-06

## 2022-10-28 NOTE — PROGRESS NOTES
Chief Complaint  Sleeping Problem    Subjective     History of Present Illness:  Colin Slater is a 32 y.o. malewith a history of severe ADHD, Asthma, and depression. He has been having difficulty with daytime sleepiness and fatigue. He had a sleep study in the past but does not know the results. He has had several concussions in the past. He has been on day shift several months hoping that this might help with his fatigue and sleepiness.  Per his questionnaire, the patient reports snoring, witnessed apnea, daytime sleepiness and fatigue, nonrestorative sleep, airway abnormalities, history of broken nose, nasal allergies, nightmares, memory loss, and lack of concentration.  This is been ongoing for 2-3 years.  He typically gets 5 to 6 hours of sleep and reports variable sleep and waking schedule on weekdays and weekends.  It takes approximately 20 minutes to initially get to sleep.  Patient denies smoking, drinks alcohol 2-3 times a week, denies use of illicit drugs.  He drinks 1 to 4 cups of coffee weekly, and 1 to 12 cups of tea weekly. He reports difficulty with memory problems. He has to wake up and wait 30 40 minutes before he can drive. This has been ongoing for 6-8 months and seemed to worsen after starting this current job. He has difficulty with fine motor skills when waking and has difficulty with depth, brushing his teeth. He describes this as he is coming out of sedation. He did have surgery on his nose and he thinks he needs another one. He difficulty breathing through nose. He had a sleep study in 2020 and appears that he has moderate sleep apnea with AHI 27/ supine.    Further details are as follows:    Kingman Scale is (out of 24): Total score: 9     Estimated average amount of sleep per night: 5-6  Number of times he wakes up at night: 1-3  Difficulty falling back asleep: no  It usually takes 20 minutes to go to sleep.  He feels sleepy upon waking up: yes  Rotating or night shift work:  yes    Drowsiness/Sleepiness:  He exhibits the following:  excessive daytime sleepiness  excessive daytime fatigue    Snoring/Breathing:  He exhibits the following:  loud snoring, snores in all sleep positions, quits breathing at night, trouble breathing through nose at night and trouble breathing through nose during the day   No headaches on waking.    Head Injury:  He exhibits the following:  Yes. Type: likely. Never diagnosed.He has had loss of consciousness.    Reflux:  He describes the following:  Occasionally on going to sleep.    Narcolepsy:  He exhibits the following:  Muscle weakness when he wakes, hallucinations when he wakes in the night.     RLS/PLMs:  He describes the following:  none    Insomnia:  He describes the following:  bothered by pain at night    Parasomnia:  He exhibits the following:  frequent nightmares  Myoclonic jerks    Weight:       10/28/22  1053   Weight: 88 kg (194 lb)      Weight change in the last year:  loss: 0 lbs    The patient's relevant past medical, surgical, family, and social history reviewed and updated in Epic as appropriate.    Review of Systems   Constitutional: Positive for appetite change and fatigue.   HENT: Positive for congestion, ear pain, sinus pressure and tinnitus.    Eyes: Positive for pain.   Respiratory: Positive for apnea.    Cardiovascular: Positive for palpitations.   Gastrointestinal: Negative.    Endocrine: Positive for heat intolerance.   Genitourinary: Negative.    Musculoskeletal: Positive for back pain, neck pain and neck stiffness.   Skin: Negative.    Allergic/Immunologic: Positive for environmental allergies.   Neurological: Positive for dizziness.   Psychiatric/Behavioral: Positive for decreased concentration and sleep disturbance.   All other systems reviewed and are negative.      PMH:    Past Medical History:   Diagnosis Date   • Acute appendicitis 8/24/2020   • ADD (attention deficit disorder)    • ADHD (attention deficit hyperactivity  "disorder)    • Asthma    • Depression    • Moderate single current episode of major depressive disorder (HCC) 4/24/2017     Past Surgical History:   Procedure Laterality Date   • APPENDECTOMY N/A 8/24/2020    Procedure: APPENDECTOMY LAPAROSCOPIC;  Surgeon: Indy Mcdaniel MD;  Location: [facility];  Service: General;  Laterality: N/A;   • EAR TUBES     • MYRINGOTOMY W/ TUBES     • NASAL SEPTUM SURGERY     • SEPTOPLASTY     • WISDOM TOOTH EXTRACTION     • WISDOM TOOTH EXTRACTION         No Known Allergies    MEDS:  Prior to Admission medications    Medication Sig Start Date End Date Taking? Authorizing Provider   albuterol sulfate  (90 Base) MCG/ACT inhaler Inhale 2 puffs Every 4 (Four) Hours As Needed for Wheezing or Shortness of Air. 6/13/22   Flor Lindo APRN   Diclofenac Sodium (VOLTAREN) 1 % gel gel Apply 4 g topically to the appropriate area as directed 4 (Four) Times a Day As Needed (joint pain). 6/13/22   Flor Lindo APRN   fluticasone (FLONASE) 50 MCG/ACT nasal spray 2 sprays into the nostril(s) as directed by provider Daily. 6/13/22   Flor Lindo APRN   Vyvanse 20 MG capsule  5/24/22   Provider, MD Lety       FH:  Family History   Problem Relation Age of Onset   • Diabetes Mother    • Diabetes Father    • Heart disease Father    • Hyperlipidemia Father    • Hypertension Father    • Thyroid disease Sister    • Early death Maternal Grandfather    • Lung cancer Paternal Grandmother    • Cancer Paternal Grandmother    • Heart disease Paternal Grandmother    • Hyperlipidemia Paternal Grandmother    • No Known Problems Paternal Grandfather        Objective   Vital Signs:  /77 (BP Location: Left arm, Patient Position: Sitting, Cuff Size: Adult)   Pulse 85   Ht 167.6 cm (66\")   Wt 88 kg (194 lb)   SpO2 97%   BMI 31.31 kg/m²           Physical Exam  Vitals reviewed.   Constitutional:       Appearance: Normal appearance.   HENT:      Head: Normocephalic and atraumatic.      " Nose: Nose normal.      Mouth/Throat:      Mouth: Mucous membranes are moist.   Cardiovascular:      Rate and Rhythm: Normal rate and regular rhythm.      Heart sounds: No murmur heard.    No friction rub. No gallop.   Pulmonary:      Effort: Pulmonary effort is normal. No respiratory distress.      Breath sounds: Normal breath sounds. No wheezing or rhonchi.   Neurological:      Mental Status: He is alert and oriented to person, place, and time.   Psychiatric:         Behavior: Behavior normal.         Mallampati Score: III (soft and hard palate and base of uvula visible)    Result Review :              Assessment and Plan  This is a 32-year-old male who presents for evaluation of excessive daytime sleepiness fatigue, nonrestorative sleep, and concerns for sleep disordered breathing and obstructive sleep apnea.  Patient has had a sleep study in the past and it appears that he was found with moderate sleep apnea.  His ongoing difficulty with memory loss, fine motor difficulties, and lack of concentration are certainly concerning.  Additionally, his descriptions of these neurological symptoms which occur just after waking are of concern.  These issues he notes seem to have in the very least been exacerbated by his current job and job schedule.  He is attempting to find different employment.  We will obtain PSG for further evaluation. The patient return for follow-up after study for recommendations.  I have discussed weight loss as a pertains to obstructive sleep apnea. He will need ambien to help sleep the night of the test.  I have ordered two 5 mg Ambien's for test.    Diagnoses and all orders for this visit:    1. Snoring (Primary)  -     Polysomnography 4 or More Parameters; Future  -     zolpidem (Ambien) 5 MG tablet; Take 1 tablet by mouth At Night As Needed for Sleep. For sleep study. Take one prior to bed and second if continues to have difficulty staying asleep during test.  Dispense: 2 tablet; Refill:  0    2. Suspected sleep apnea  -     Polysomnography 4 or More Parameters; Future  -     zolpidem (Ambien) 5 MG tablet; Take 1 tablet by mouth At Night As Needed for Sleep. For sleep study. Take one prior to bed and second if continues to have difficulty staying asleep during test.  Dispense: 2 tablet; Refill: 0    3. Excessive daytime sleepiness  -     Polysomnography 4 or More Parameters; Future  -     zolpidem (Ambien) 5 MG tablet; Take 1 tablet by mouth At Night As Needed for Sleep. For sleep study. Take one prior to bed and second if continues to have difficulty staying asleep during test.  Dispense: 2 tablet; Refill: 0    4. Confusional arousals  -     Polysomnography 4 or More Parameters; Future    5. Psychophysiological insomnia  -     zolpidem (Ambien) 5 MG tablet; Take 1 tablet by mouth At Night As Needed for Sleep. For sleep study. Take one prior to bed and second if continues to have difficulty staying asleep during test.  Dispense: 2 tablet; Refill: 0                 I discussed the consequences of uncontrolled sleep apnea including hypertension, heart disease, diabetes, stroke, and dementia. I further discussed sleep apnea therapeutic options including CPAP, Weight loss, Oral dental appliance, and surgery.    Follow Up  Return for Follow up after study.  Patient was given instructions and counseling regarding his condition or for health maintenance advice. Please see specific information pulled into the AVS if appropriate.     DEUCE Cosme, ACNP-BC  Pulmonary, Critical Care Medicine, and Sleep Medicine  Electronically signed by DEUCE Serrano, 10/28/22, 7:35 AM EDT.

## 2022-10-31 ENCOUNTER — TELEPHONE (OUTPATIENT)
Dept: SLEEP MEDICINE | Facility: CLINIC | Age: 32
End: 2022-10-31

## 2022-10-31 DIAGNOSIS — G47.33 OSA (OBSTRUCTIVE SLEEP APNEA): Primary | ICD-10-CM

## 2022-10-31 NOTE — TELEPHONE ENCOUNTER
PATIENT WAS SEEN Friday AND UNDER THE IMPRESSION THAT A CPAP WOULD BE ORDERED BASED ON PREVIOUS HST AT [facility]    PLEASE ADVISE

## 2022-11-02 NOTE — PROGRESS NOTES
CALLED PATIENT AND ADVISED OF STUDY RESULTS. PATIENT VERBALIZED UNDERSTANDING AND WAS AGREEABLE TO PAP THERAPY. FAXED ORDER TO [facility] 11/02/22 TRC

## 2022-12-01 ENCOUNTER — OFFICE VISIT (OUTPATIENT)
Dept: INTERNAL MEDICINE | Facility: CLINIC | Age: 32
End: 2022-12-01

## 2022-12-01 ENCOUNTER — TELEPHONE (OUTPATIENT)
Dept: INTERNAL MEDICINE | Facility: CLINIC | Age: 32
End: 2022-12-01

## 2022-12-01 VITALS
WEIGHT: 203 LBS | DIASTOLIC BLOOD PRESSURE: 70 MMHG | OXYGEN SATURATION: 99 % | HEIGHT: 66 IN | SYSTOLIC BLOOD PRESSURE: 120 MMHG | TEMPERATURE: 98.2 F | HEART RATE: 89 BPM | BODY MASS INDEX: 32.62 KG/M2

## 2022-12-01 DIAGNOSIS — R05.1 ACUTE COUGH: Primary | ICD-10-CM

## 2022-12-01 LAB
EXPIRATION DATE: ABNORMAL
FLUAV AG UPPER RESP QL IA.RAPID: DETECTED
FLUBV AG UPPER RESP QL IA.RAPID: NOT DETECTED
INTERNAL CONTROL: ABNORMAL
Lab: ABNORMAL
SARS-COV-2 AG UPPER RESP QL IA.RAPID: NOT DETECTED

## 2022-12-01 PROCEDURE — 87428 SARSCOV & INF VIR A&B AG IA: CPT | Performed by: INTERNAL MEDICINE

## 2022-12-01 PROCEDURE — 99213 OFFICE O/P EST LOW 20 MIN: CPT | Performed by: INTERNAL MEDICINE

## 2022-12-01 RX ORDER — DEXTROMETHORPHAN HYDROBROMIDE AND PROMETHAZINE HYDROCHLORIDE 15; 6.25 MG/5ML; MG/5ML
5 SYRUP ORAL 4 TIMES DAILY PRN
Qty: 100 ML | Refills: 0 | Status: SHIPPED | OUTPATIENT
Start: 2022-12-01 | End: 2023-04-06

## 2022-12-01 RX ORDER — PROMETHAZINE HYDROCHLORIDE AND CODEINE PHOSPHATE 6.25; 1 MG/5ML; MG/5ML
5 SYRUP ORAL EVERY 6 HOURS PRN
Qty: 118 ML | Refills: 0 | Status: SHIPPED | OUTPATIENT
Start: 2022-12-01 | End: 2023-04-06

## 2022-12-01 NOTE — TELEPHONE ENCOUNTER
Caller: Colin Slater    Relationship: Self    Best call back number: [phone]     What was the call regarding: PATIENT CALLED STATING THAT THE PRESCRIPTION FOR promethazine-codeine (PHENERGAN with CODEINE) 6.25-10 MG/5ML syrup IS NOT AVAILABLE AT [facility].  PATIENT ASKED FOR SOMETHING  ELSE TO CALL INTO THE PHARMACY.    Do you require a callback:YES

## 2022-12-01 NOTE — TELEPHONE ENCOUNTER
Caller: Colin Slater    Relationship: Self    Best call back number: [phone]    What is the best time to reach you: ANYTIME    Who are you requesting to speak with (clinical staff, provider,  specific staff member): CLINICAL STAFF    Do you know the name of the person who called: PATIENT    What was the call regarding: PATIENT ADVISES HE NEEDS A WORK EXCUSE PUT ON MYCHART EXCUSING HIM FROM WORK AND SPECIFIC DATES OF WHEN HE NEEDS TO BE OFF AND WHEN HE CAN RETURN. PLEASE ADVISE.    Do you require a callback: YES

## 2022-12-01 NOTE — PROGRESS NOTES
"Subjective  Colin Slater is a 32 y.o. male    HPI cough congestion for at least 1 week had a low-grade fever complains of fatigue has tried OTC meds without much relief non-smoker    The following portions of the patient's history were reviewed and updated as appropriate: allergies, current medications, past family history, past medical history, past social history, past surgical history, and problem list.     Review of Systems   Constitutional: Positive for fatigue. Negative for activity change, appetite change and fever.   HENT: Negative for congestion, ear discharge, ear pain and trouble swallowing.    Eyes: Negative for photophobia and visual disturbance.   Respiratory: Positive for cough. Negative for shortness of breath.    Cardiovascular: Negative for chest pain and palpitations.   Gastrointestinal: Negative for abdominal distention, abdominal pain, constipation, diarrhea, nausea and vomiting.   Endocrine: Negative.    Genitourinary: Negative for dysuria, hematuria and urgency.   Musculoskeletal: Positive for myalgias. Negative for arthralgias, back pain and joint swelling.   Skin: Negative for color change and rash.   Allergic/Immunologic: Negative.    Neurological: Negative for dizziness, weakness, light-headedness and headaches.   Hematological: Negative for adenopathy. Does not bruise/bleed easily.   Psychiatric/Behavioral: Negative for agitation, confusion and dysphoric mood. The patient is not nervous/anxious.        Visit Vitals  /70   Pulse 89   Temp 98.2 °F (36.8 °C) (Oral)   Ht 167.6 cm (66\")   Wt 92.1 kg (203 lb)   SpO2 99%   BMI 32.77 kg/m²       Objective  Physical Exam  Constitutional:       General: He is not in acute distress.     Appearance: He is well-developed.   HENT:      Nose: Nose normal.   Eyes:      General: No scleral icterus.     Conjunctiva/sclera: Conjunctivae normal.   Neck:      Thyroid: No thyromegaly.      Trachea: No tracheal deviation.   Cardiovascular:      Rate and " Rhythm: Normal rate and regular rhythm.      Heart sounds: No murmur heard.    No friction rub.   Pulmonary:      Effort: No respiratory distress.      Breath sounds: No wheezing or rales.   Abdominal:      General: There is no distension.      Palpations: Abdomen is soft. There is no mass.      Tenderness: There is no abdominal tenderness. There is no guarding.   Musculoskeletal:         General: No deformity. Normal range of motion.   Lymphadenopathy:      Cervical: No cervical adenopathy.   Skin:     General: Skin is warm and dry.      Findings: No erythema or rash.   Neurological:      Mental Status: He is alert and oriented to person, place, and time.      Cranial Nerves: No cranial nerve deficit.      Coordination: Coordination normal.      Deep Tendon Reflexes: Reflexes are normal and symmetric.   Psychiatric:         Behavior: Behavior normal.         Thought Content: Thought content normal.         Judgment: Judgment normal.         Diagnoses and all orders for this visit:    Acute cough positive for influenza A.  Advised conservative measures warm water with salt gargles cough syrup with codeine for severe cough.  O2 sat and lung auscultation unremarkable.  Tylenol or Advil as needed  -     POCT SARS-CoV-2 Antigen NANCY + Flu    Other orders  -     promethazine-codeine (PHENERGAN with CODEINE) 6.25-10 MG/5ML syrup; Take 5 mL by mouth Every 6 (Six) Hours As Needed for Cough.

## 2022-12-01 NOTE — TELEPHONE ENCOUNTER
Pharmacy Name:  [facility]    Pharmacy representative name: KRYSTAL    Pharmacy representative phone number: [phone]    What medication are you calling in regards to: PROMETHAZINE WITH CODEINE    What question does the pharmacy have: [facility] DOES NOT CARRY THIS MEDICATION ANYMORE.    Who is the provider that prescribed the medication: ROMAN

## 2023-02-10 ENCOUNTER — TELEPHONE (OUTPATIENT)
Dept: SLEEP MEDICINE | Facility: HOSPITAL | Age: 33
End: 2023-02-10
Payer: OTHER GOVERNMENT

## 2023-02-10 NOTE — TELEPHONE ENCOUNTER
LVM WITH PT TO CALL [facility] TO BE SETUP WITH NEW PAP MACHINE.      ALSO REMINDED PATIENT THAT HE IS SCHEDULED FOR PSG IN [facility] IN MARCH.

## 2023-03-08 ENCOUNTER — TELEPHONE (OUTPATIENT)
Dept: SLEEP MEDICINE | Facility: CLINIC | Age: 33
End: 2023-03-08
Payer: OTHER GOVERNMENT

## 2023-03-08 NOTE — TELEPHONE ENCOUNTER
I spoke with Colin about his insurance authorization for the sleep study not going through before his study scheduled tomorrow. I transferred him to central scheduling to reschedule at the end of April.

## 2023-04-06 ENCOUNTER — OFFICE VISIT (OUTPATIENT)
Dept: INTERNAL MEDICINE | Facility: CLINIC | Age: 33
End: 2023-04-06
Payer: OTHER GOVERNMENT

## 2023-04-06 VITALS
OXYGEN SATURATION: 96 % | SYSTOLIC BLOOD PRESSURE: 130 MMHG | TEMPERATURE: 98.5 F | DIASTOLIC BLOOD PRESSURE: 78 MMHG | HEART RATE: 106 BPM | WEIGHT: 196.5 LBS | HEIGHT: 66 IN | BODY MASS INDEX: 31.58 KG/M2

## 2023-04-06 DIAGNOSIS — Z11.3 ROUTINE SCREENING FOR STI (SEXUALLY TRANSMITTED INFECTION): ICD-10-CM

## 2023-04-06 DIAGNOSIS — Z98.890 S/P RHINOPLASTY: ICD-10-CM

## 2023-04-06 DIAGNOSIS — F98.8 ATTENTION DEFICIT DISORDER, UNSPECIFIED HYPERACTIVITY PRESENCE: Primary | ICD-10-CM

## 2023-04-06 PROCEDURE — 96127 BRIEF EMOTIONAL/BEHAV ASSMT: CPT | Performed by: NURSE PRACTITIONER

## 2023-04-06 PROCEDURE — 99213 OFFICE O/P EST LOW 20 MIN: CPT | Performed by: NURSE PRACTITIONER

## 2023-04-06 NOTE — PROGRESS NOTES
Chief Complaint   Patient presents with   • Mental Health Problem     Patient requesting referral to [facility] (psychiatry)     Subjective   Colin Slater is a 33 y.o. male.     History of Present Illness     Patient presents today for referral to [facility] Multani, psychiatry.  Patient was seeing her and she transitioned over into her own practice.  Patient states that she informed him she needed a referral placed for him to continue to see her.  She sees him for ADD/ADHD, prescribes Vyvanse 20 mg and Adderall 5 mg.  He has been out of his medicines for a few days and contributes this to elevated PHQ and NEO score.  Patient recently had his septorhinoplasty and is doing very well.  There is still some mild swelling.  No pain.  He is able to breathe much better.  Feels like he is resting and sleeping better.  Patient is asking if I can place an order for HIV, it is needed periodically for the Cognea.  He denies any known exposure and practices safe sex.  He declines other STI testing.  He is due a physical exam and labs.  He declines a COVID booster today.    PHQ-9 Depression Screening  Little interest or pleasure in doing things? 1-->several days   Feeling down, depressed, or hopeless? 1-->several days   Trouble falling or staying asleep, or sleeping too much? 2-->more than half the days   Feeling tired or having little energy? 1-->several days   Poor appetite or overeating? 2-->more than half the days   Feeling bad about yourself - or that you are a failure or have let yourself or your family down? 0-->not at all   Trouble concentrating on things, such as reading the newspaper or watching television? 3-->nearly every day   Moving or speaking so slowly that other people could have noticed? Or the opposite - being so fidgety or restless that you have been moving around a lot more than usual? 0-->not at all   Thoughts that you would be better off dead, or of hurting yourself in some way? 0-->not at all   PHQ-9 Total  "Score 10   If you checked off any problems, how difficult have these problems made it for you to do your work, take care of things at home, or get along with other people? very difficult     Anxiety NEO-7 4/6/2023   Feeling nervous, anxious or on edge 1   Not being able to stop or control worrying 0   Worrying too much about different things 2   Trouble Relaxing 2   Being so restless that it is hard to sit still 3   Becoming easily annoyed or irritable 1   Feeling afraid as if something awful might happen 1   NEO 7 Total Score 10   If you checked any problems, how difficult have these problems made it for you to do your work, take care of things at home, or get along with other people Somewhat difficult       The following portions of the patient's history were reviewed and updated as appropriate: allergies, current medications, past family history, past medical history, past social history, past surgical history and problem list.      Objective   /78 (BP Location: Left arm, Patient Position: Sitting, Cuff Size: Adult)   Pulse 106   Temp 98.5 °F (36.9 °C) (Temporal)   Ht 167.6 cm (66\")   Wt 89.1 kg (196 lb 8 oz)   SpO2 96%   BMI 31.72 kg/m²   Body mass index is 31.72 kg/m².  Physical Exam  Constitutional:       Appearance: Normal appearance.   HENT:      Head: Normocephalic and atraumatic.      Nose:      Comments: Minimal swelling, device placed in nose from septorhinoplasty  Eyes:      Extraocular Movements: Extraocular movements intact.   Cardiovascular:      Rate and Rhythm: Normal rate.   Pulmonary:      Effort: Pulmonary effort is normal.   Musculoskeletal:         General: Normal range of motion.      Cervical back: Normal range of motion.   Neurological:      General: No focal deficit present.      Mental Status: He is alert and oriented to person, place, and time.   Psychiatric:         Mood and Affect: Mood normal.         Behavior: Behavior normal.         Thought Content: Thought content " normal.         Judgment: Judgment normal.         Assessment & Plan   Colin Slater is here today and the following problems have been addressed:      Diagnoses and all orders for this visit:    1. Attention deficit disorder, unspecified hyperactivity presence (Primary)  -     Ambulatory Referral to Psychiatry    2. Routine screening for STI (sexually transmitted infection)  -     HIV-1/O/2 Ag/Ab w Reflex    3. S/P rhinoplasty    Referral placed to psychiatry and printed for patient.  This is not a new provider to him, he sees her regularly but needs a new referral placed.  Order placed for HIV testing per patient request, [facility] does this annually and this would help him out by going ahead and having this drawn.  He denies any STI symptoms or exposure.  He declines any other STI testing today.  Continue to see ENT for follow-up of septorhinoplasty.  He is due for physical exam and labs, schedule an appointment at checkout.  Declines a COVID booster.    Follow Up   Return for Annual.  Patient was given instructions and counseling regarding his condition or for health maintenance advice. Please see specific information pulled into the AVS if appropriate.     Flor TRIPATHI  [facility]

## 2023-04-07 LAB — HIV 1+2 AB+HIV1 P24 AG SERPL QL IA: NON REACTIVE

## 2023-07-25 DIAGNOSIS — J30.1 SEASONAL ALLERGIC RHINITIS DUE TO POLLEN: ICD-10-CM

## 2023-07-25 RX ORDER — FLUTICASONE PROPIONATE 50 MCG
2 SPRAY, SUSPENSION (ML) NASAL DAILY
Qty: 16 G | Refills: 5 | Status: SHIPPED | OUTPATIENT
Start: 2023-07-25 | End: 2023-07-26 | Stop reason: SDUPTHER

## 2023-07-26 DIAGNOSIS — J30.1 SEASONAL ALLERGIC RHINITIS DUE TO POLLEN: ICD-10-CM

## 2023-07-26 RX ORDER — FLUTICASONE PROPIONATE 50 MCG
2 SPRAY, SUSPENSION (ML) NASAL DAILY
Qty: 16 G | Refills: 5 | Status: SHIPPED | OUTPATIENT
Start: 2023-07-26

## 2023-07-26 NOTE — TELEPHONE ENCOUNTER
Caller: Colin Slater    Relationship: Self    Best call back number: [phone]       Requested Prescriptions:   Requested Prescriptions     Pending Prescriptions Disp Refills    fluticasone (FLONASE) 50 MCG/ACT nasal spray 16 g 5     Si sprays into the nostril(s) as directed by provider Daily.        Pharmacy where request should be sent: [facility] [address] [phone] [facility] [phone]      Last office visit with prescribing clinician: 2023   Last telemedicine visit with prescribing clinician: Visit date not found   Next office visit with prescribing clinician: Visit date not found     Additional details provided by patient: PATIENT HAS RUN OUT OF HIS ALLERGY SPRAY. PLEASE ADVISE IF NEEDS TO BE SEEN    Does the patient have less than a 3 day supply:  [x] Yes  [] No    Would you like a call back once the refill request has been completed: [] Yes [] No    If the office needs to give you a call back, can they leave a voicemail: [] Yes [] No    Scott Keys   23 14:25 EDT

## 2023-08-18 ENCOUNTER — OFFICE VISIT (OUTPATIENT)
Dept: INTERNAL MEDICINE | Facility: CLINIC | Age: 33
End: 2023-08-18
Payer: OTHER GOVERNMENT

## 2023-08-18 VITALS
HEART RATE: 97 BPM | TEMPERATURE: 98.6 F | BODY MASS INDEX: 31.98 KG/M2 | OXYGEN SATURATION: 98 % | SYSTOLIC BLOOD PRESSURE: 138 MMHG | HEIGHT: 66 IN | WEIGHT: 199 LBS | DIASTOLIC BLOOD PRESSURE: 82 MMHG

## 2023-08-18 DIAGNOSIS — R41.89 BRAIN FOG: ICD-10-CM

## 2023-08-18 DIAGNOSIS — R20.9 SENSORY DISORDER: ICD-10-CM

## 2023-08-18 DIAGNOSIS — R41.3 SHORT-TERM MEMORY LOSS: ICD-10-CM

## 2023-08-18 DIAGNOSIS — M79.604 RIGHT LEG PAIN: ICD-10-CM

## 2023-08-18 DIAGNOSIS — G47.33 OSA (OBSTRUCTIVE SLEEP APNEA): ICD-10-CM

## 2023-08-18 DIAGNOSIS — Z87.820 HISTORY OF MULTIPLE CONCUSSIONS: ICD-10-CM

## 2023-08-18 DIAGNOSIS — M76.61 ACHILLES TENDINITIS, RIGHT LEG: Primary | ICD-10-CM

## 2023-08-18 DIAGNOSIS — R22.9 LOCALIZED SOFT TISSUE SWELLING: ICD-10-CM

## 2023-08-18 PROCEDURE — 99214 OFFICE O/P EST MOD 30 MIN: CPT | Performed by: NURSE PRACTITIONER

## 2023-08-18 RX ORDER — KETOCONAZOLE 20 MG/G
1 CREAM TOPICAL DAILY PRN
Qty: 14 G | Refills: 1 | Status: SHIPPED | OUTPATIENT
Start: 2023-08-18

## 2023-08-18 NOTE — PROGRESS NOTES
"Chief Complaint   Patient presents with    Memory Loss     Short term memory loss X 2 years     Med Refill     Requesting scripts for Voltaren gel 1% and Ketoconazole Cream, has been prescribed these in the past, no longer on medication list. Scripts pended for approval    sensory issues     X 3-4 months.     Ankle Pain     Right     Subjective       HPI:  Colin Slater is a 33 y.o. male presents for multiple issues.    He is requesting refills of Voltaren gel and ketoconazole cream to use as needed.     He is having an issue to the left of his Achilles tendon of the right foot. There is some inflammation and swelling, but no erythema. He has pain daily. Voltaren gel and compression wraps help. He possibly injured his Achilles at some point. 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 It has been ongoing for 8 to 10 months. The swelling is not improving or worsening. He does not have significant pain with movement and has full range of motion, but at the end of the day when he is lying down, it is worse. He does try to wear good fitting shoes, but he does wear combat boots that are provided for him. When he wears a shorter boot, it hurts less.    He has been having sensory issues for 3 to 4 months. He has \"jerked\" with adrenaline when tickled, such as when he woke up after his cats tickled his face. If he barely hits something, he has an adrenaline rush. For example, if he hits his elbow, he has an adrenaline rush and feels his heart racing. After a few seconds, he will calm down. He will be evaluated for Asperger's syndrome per recommendation by psychiatrist but he is on a waiting list. He has been told that he has a mild version, but was not diagnosed as a child. He did have sensory problems as a child he believes. He does have ADHD and is taking Vyvanse 20 mg.  He does also admit to brain fog and short-term memory issues. He has a history of multiple " "concussions. We completed an MRI without contrast on 10/20/2023 and it was unremarkable.     He had a sleep study and was diagnosed with sleep apnea. He did not get treated, and had a rhinoplasty. His symptoms somewhat improved, and he needs the sleep study repeated. He is established with pulm/sleep med.        History:    Past Medical History:   Diagnosis Date    Acute appendicitis 8/24/2020    ADD (attention deficit disorder)     ADHD (attention deficit hyperactivity disorder)     Asthma     Depression     Moderate single current episode of major depressive disorder 4/24/2017       Past Surgical History:   Procedure Laterality Date    APPENDECTOMY N/A 8/24/2020    Procedure: APPENDECTOMY LAPAROSCOPIC;  Surgeon: Indy Mcdaniel MD;  Location: [facility];  Service: General;  Laterality: N/A;    EAR TUBES      MYRINGOTOMY W/ TUBES      NASAL SEPTUM SURGERY      SEPTOPLASTY      WISDOM TOOTH EXTRACTION      WISDOM TOOTH EXTRACTION         Allergies   Allergen Reactions    Hydrocodone Hallucinations     Patient states that he had \"mild hallucinations\" with this medication    Apple Juice Other (See Comments)     Diarrhea/constipation    Banana Other (See Comments)     Diarrhea/constipation         Current Outpatient Medications:     fluticasone (FLONASE) 50 MCG/ACT nasal spray, 2 sprays into the nostril(s) as directed by provider Daily., Disp: 16 g, Rfl: 5    Vyvanse 20 MG capsule, , Disp: , Rfl:     Diclofenac Sodium (VOLTAREN) 1 % gel gel, Apply 4 g topically to the appropriate area as directed 4 (Four) Times a Day As Needed (joint pain)., Disp: 350 g, Rfl: 1    ketoconazole (NIZORAL) 2 % cream, Apply 1 application  topically to the appropriate area as directed Daily As Needed for Itching., Disp: 14 g, Rfl: 1    The following portions of the patient's history were reviewed and updated as appropriate: allergies, current medications, past family history, past medical history, past social history, past surgical history " "and problem list.      Objective   /82   Pulse 97   Temp 98.6 øF (37 øC)   Ht 167.6 cm (66\")   Wt 90.3 kg (199 lb)   SpO2 98%   BMI 32.12 kg/mý   Body mass index is 32.12 kg/mý.  Physical Exam  Constitutional:       General: He is not in acute distress.     Appearance: Normal appearance. He is not ill-appearing, toxic-appearing or diaphoretic.   HENT:      Head: Normocephalic and atraumatic.   Eyes:      Extraocular Movements: Extraocular movements intact.   Cardiovascular:      Rate and Rhythm: Normal rate and regular rhythm.   Pulmonary:      Effort: Pulmonary effort is normal.      Breath sounds: Normal breath sounds.   Musculoskeletal:         General: Normal range of motion.      Cervical back: Normal range of motion.      Comments: Soft tissue swelling noted right side of achilles tendon, nontender to palpation, negative lin test    Neurological:      General: No focal deficit present.      Mental Status: He is alert and oriented to person, place, and time.      Cranial Nerves: No cranial nerve deficit.      Sensory: Sensation is intact. No sensory deficit.      Motor: Motor function is intact. No weakness, tremor or abnormal muscle tone.      Coordination: Coordination is intact. Romberg sign negative. Coordination normal.      Gait: Gait is intact. Gait normal.   Psychiatric:         Mood and Affect: Mood normal.         Behavior: Behavior normal.         Thought Content: Thought content normal.         Judgment: Judgment normal.         Assessment & Plan   Colin Slater is here today and the following problems have been addressed:      Diagnoses and all orders for this visit:    1. Achilles tendinitis, right leg (Primary)  -     Diclofenac Sodium (VOLTAREN) 1 % gel gel; Apply 4 g topically to the appropriate area as directed 4 (Four) Times a Day As Needed (joint pain).  Dispense: 350 g; Refill: 1  -     US soft tissue; Future    2. Right leg pain  -     US soft tissue; Future    3. " Localized soft tissue swelling  -     US soft tissue; Future    4. Short-term memory loss  -     Ambulatory Referral to Neuropsychology    5. Brain fog  -     Ambulatory Referral to Neuropsychology    6. History of multiple concussions  -     Ambulatory Referral to Neuropsychology    7. Sensory disorder  -     Ambulatory Referral to Neuropsychology    8. PRINCESS (obstructive sleep apnea)    Other orders  -     ketoconazole (NIZORAL) 2 % cream; Apply 1 application  topically to the appropriate area as directed Daily As Needed for Itching.  Dispense: 14 g; Refill: 1    Ultrasound to the back of right lower extremity was ordered of soft tissue and to evaluate Achilles tendon. Diclofenac refilled to use as needed. He was advised to take NSAIDs, apply ice, rest, use compression wrap, and wear proper fitting shoes. He will elevate foot when at rest.    Referral to neuropsychology for short term memory loss, brain fog, history of concussions, and sensory disorder for further evaluation.     Follow up with sleep medicine provider for PRINCESS. Number provided for patient to call and schedule follow-up. Untreated sleep apnea can lead to some of the symptoms he is having. Needs reevaluated s/p rhinoplasty.     Follow Up   Return for Annual, when patient able .  Patient was given instructions and counseling regarding his condition or for health maintenance advice. Please see specific information pulled into the AVS if appropriate.     Flor TRIPATHI  [facility]    Transcribed from ambient dictation for DEUCE Tuprin by Fouzia Qureshi.  08/18/23   17:00 EDT    I have personally performed the services described in this document as transcribed by the above individual, and it is both accurate and complete.    I spent 38 minutes caring for Colin Slater on this date of service. This time includes time spent by me in the following activities: preparing for the visit, reviewing tests,  performing a medically appropriate examination and/or evaluation, counseling and educating the patient/family/caregiver, ordering medications, tests, or procedures and documenting information in the medical record.

## 2023-10-05 DIAGNOSIS — S86.001D INJURY OF RIGHT ACHILLES TENDON, SUBSEQUENT ENCOUNTER: ICD-10-CM

## 2023-10-05 DIAGNOSIS — M76.61 ACHILLES TENDINITIS, RIGHT LEG: Primary | ICD-10-CM

## 2023-10-05 DIAGNOSIS — R22.9 LOCALIZED SOFT TISSUE SWELLING: ICD-10-CM

## 2023-10-20 ENCOUNTER — TELEPHONE (OUTPATIENT)
Dept: INTERNAL MEDICINE | Facility: CLINIC | Age: 33
End: 2023-10-20
Payer: OTHER GOVERNMENT

## 2023-10-20 NOTE — TELEPHONE ENCOUNTER
Caller: Colin Slater    Relationship to patient: Self    Best call back number: [phone]    Patient is needing: PATIENT STATED THAT HE COULD NOT GET INTO THAT PLACE UNTIL JUNE 27,2024 AND WOULD LIKE TO SEE ABOUT GETTING INTO SOMEWHERE THIS YEAR IF POSSIBLE    PLEASE ADVISE

## 2023-10-20 NOTE — TELEPHONE ENCOUNTER
Caller: Colin Slater    Relationship: Self    Best call back number: [phone]     What is the medical concern/diagnosis: DOESN'T REMEMBER- WAS REFERRED BY LEWIS OWENS BACK IN AUGUST.    What specialty or service is being requested: NEUROPSYCHOLOGIST    Any additional details: PATIENT REQUESTS A CALL BACK WITH THE REFERRAL DETAILS- HE MUST SCHEDULE HIMSELF DUE TO HE HAS 3 JOBS.

## 2023-10-23 NOTE — TELEPHONE ENCOUNTER
I agree with Stacey. Let pt know this is the only facility that does this and that appointment is actually sooner than typical so keep it.

## 2023-10-23 NOTE — TELEPHONE ENCOUNTER
is the only facility in the area that does this kind of evaluation. Honestly, June 27 is much quicker of an appt than anticipated. I would encourage to keep that appt.  and [facility] do not offer neuropsychology evaluation.

## 2023-11-09 NOTE — PROGRESS NOTES
Sleep Clinic Follow Up Note    Chief Complaint  Follow-up    Subjective     History of Present Illness (from previous encounter on 10/28/2022):  This is a 32-year-old male who presents for evaluation of excessive daytime sleepiness fatigue, nonrestorative sleep, and concerns for sleep disordered breathing and obstructive sleep apnea.  Patient has had a sleep study in the past and it appears that he was found with moderate sleep apnea.  His ongoing difficulty with memory loss, fine motor difficulties, and lack of concentration are certainly concerning.  Additionally, his descriptions of these neurological symptoms which occur just after waking are of concern.  These issues he notes seem to have in the very least been exacerbated by his current job and job schedule.  He is attempting to find different employment.  We will obtain PSG for further evaluation. The patient return for follow-up after study for recommendations.  I have discussed weight loss as a pertains to obstructive sleep apnea. He will need ambien to help sleep the night of the test.  I have ordered two 5 mg Ambien's for test. (End copied text).    Interval History:  Colin Slater is a 33 y.o. male returns for follow up. The patient was last seen on 10/28/2022. He has not yet obtained a device. He had nasal surgery since he was last seen and this has helped him. He continues to have symptoms of sleep apnea. He continues to snore.On average the patient sleeps 4-7, 9-10 hours per night. The patient wakes frequently but goes back to sleep. He gets hot when he is sleeping and does have some sweating. He reports having palpitations occasionally. He is not getting dizzy when this occurs.    The patient reports the following changes to their medical and medication history since they were last seen:  Sinus surgery    Further details are as follows:      [place] Scale is (out of 24): Total score: 11     Weight:  Current Weight: 195 lb      The patient's relevant  "past medical, surgical, family, and social history reviewed and updated in Epic as appropriate.    PMH:    Past Medical History:   Diagnosis Date    Acute appendicitis 8/24/2020    ADD (attention deficit disorder)     ADHD (attention deficit hyperactivity disorder)     Asthma     Depression     Moderate single current episode of major depressive disorder 4/24/2017     Past Surgical History:   Procedure Laterality Date    APPENDECTOMY N/A 08/24/2020    Procedure: APPENDECTOMY LAPAROSCOPIC;  Surgeon: Indy Mcdaniel MD;  Location: [facility];  Service: General;  Laterality: N/A;    EAR TUBES      MYRINGOTOMY W/ TUBES      NASAL SEPTUM SURGERY      NOSE SURGERY  04/2023    SEPTOPLASTY      WISDOM TOOTH EXTRACTION      WISDOM TOOTH EXTRACTION         Allergies   Allergen Reactions    Hydrocodone Hallucinations     Patient states that he had \"mild hallucinations\" with this medication    Apple Juice Other (See Comments)     Diarrhea/constipation    Banana Other (See Comments)     Diarrhea/constipation       MEDS:  Prior to Admission medications    Medication Sig Start Date End Date Taking? Authorizing Provider   Diclofenac Sodium (VOLTAREN) 1 % gel gel Apply 4 g topically to the appropriate area as directed 4 (Four) Times a Day As Needed (joint pain). 8/18/23   Flor Lindo APRN   fluticasone (FLONASE) 50 MCG/ACT nasal spray 2 sprays into the nostril(s) as directed by provider Daily. 7/26/23   Flor Lindo APRN   ketoconazole (NIZORAL) 2 % cream Apply 1 application  topically to the appropriate area as directed Daily As Needed for Itching. 8/18/23   Flor Lindo APRN   Vyvanse 20 MG capsule  5/24/22   Provider, MD Lety         FH:  Family History   Problem Relation Age of Onset    Diabetes Mother     Diabetes Father     Heart disease Father     Hyperlipidemia Father     Hypertension Father     Thyroid disease Sister     Early death Maternal Grandfather     Lung cancer Paternal Grandmother     Cancer " "Paternal Grandmother     Heart disease Paternal Grandmother     Hyperlipidemia Paternal Grandmother     No Known Problems Paternal Grandfather        Objective   Vital Signs:  /76 (BP Location: Left arm, Patient Position: Sitting, Cuff Size: Adult)   Pulse 78   Ht 167.6 cm (66\")   Wt 88.5 kg (195 lb)   SpO2 97%   BMI 31.47 kg/m²              Physical Exam  Vitals reviewed.   Constitutional:       Appearance: Normal appearance.   HENT:      Head: Normocephalic and atraumatic.      Nose: Nose normal.      Mouth/Throat:      Mouth: Mucous membranes are moist.   Cardiovascular:      Rate and Rhythm: Normal rate and regular rhythm.      Heart sounds: No murmur heard.     No friction rub. No gallop.   Pulmonary:      Effort: Pulmonary effort is normal. No respiratory distress.      Breath sounds: Normal breath sounds. No wheezing or rhonchi.   Neurological:      Mental Status: He is alert and oriented to person, place, and time.   Psychiatric:         Behavior: Behavior normal.               Result Review :              Assessment and Plan  Colin Slater is a 33 y.o. male who returns for follow-up.  The patient was last seen on 10/28/2022.  He recently had nasal surgery and feels as though this has helped somewhat though he continues to have episodes of apneic symptoms.  Patient last had a sleep study at [facility] on 8/14/2020 in which she was found with mild sleep apnea with an AHI of 11.1/H.  Patient has not started PAP therapy.  I discussed alternatives including MAD, and referral to otolaryngology.  Patient would like to try PAP therapy.  I will place orders for new device and supplies and have asked the patient return for follow-up in compliance in 31-90 days.  I have noted that the patient will need to use the device more than 4 hours a night, more than 70% of the time in order to remain fully compliant.  Additionally, I have noted that occasionally insurance will require a new sleep study " in order to reestablish diagnosis.  If this be the case then I will place an order for home sleep test anticipating the need for device following.    Diagnoses and all orders for this visit:    1. PRINCESS (obstructive sleep apnea) (Primary)  -     PAP Therapy    2. Obesity (BMI 30.0-34.9)             1. The patient was counseled regarding multimodal approach with healthy nutrition, healthy sleep, regular physical activity, social activities, counseling, and medications. Encouraged to practice lateral sleep position. Avoid alcohol and sedatives close to bedtime.            Follow Up  Return for 31 to 90 days after PAP setup.  Patient was given instructions and counseling regarding his condition or for health maintenance advice. Please see specific information pulled into the AVS if appropriate.       EDUCE Cosme, ACNP-BC  Pulmonology, Critical Care, and Sleep Medicine

## 2023-11-14 ENCOUNTER — OFFICE VISIT (OUTPATIENT)
Dept: SLEEP MEDICINE | Facility: HOSPITAL | Age: 33
End: 2023-11-14
Payer: OTHER GOVERNMENT

## 2023-11-14 VITALS
OXYGEN SATURATION: 97 % | WEIGHT: 195 LBS | HEIGHT: 66 IN | DIASTOLIC BLOOD PRESSURE: 76 MMHG | HEART RATE: 78 BPM | BODY MASS INDEX: 31.34 KG/M2 | SYSTOLIC BLOOD PRESSURE: 140 MMHG

## 2023-11-14 DIAGNOSIS — G47.33 OSA (OBSTRUCTIVE SLEEP APNEA): Primary | ICD-10-CM

## 2023-11-14 DIAGNOSIS — E66.9 OBESITY (BMI 30.0-34.9): ICD-10-CM

## 2024-02-23 ENCOUNTER — OFFICE VISIT (OUTPATIENT)
Dept: INTERNAL MEDICINE | Facility: CLINIC | Age: 34
End: 2024-02-23
Payer: OTHER GOVERNMENT

## 2024-02-23 ENCOUNTER — HOSPITAL ENCOUNTER (OUTPATIENT)
Dept: GENERAL RADIOLOGY | Facility: HOSPITAL | Age: 34
Discharge: HOME | End: 2024-02-23
Admitting: NURSE PRACTITIONER
Payer: OTHER GOVERNMENT

## 2024-02-23 VITALS
HEIGHT: 66 IN | DIASTOLIC BLOOD PRESSURE: 80 MMHG | HEART RATE: 83 BPM | SYSTOLIC BLOOD PRESSURE: 134 MMHG | TEMPERATURE: 99.1 F | WEIGHT: 195 LBS | OXYGEN SATURATION: 98 % | BODY MASS INDEX: 31.34 KG/M2

## 2024-02-23 DIAGNOSIS — Z87.820 HISTORY OF MULTIPLE CONCUSSIONS: ICD-10-CM

## 2024-02-23 DIAGNOSIS — R53.82 CHRONIC FATIGUE: ICD-10-CM

## 2024-02-23 DIAGNOSIS — G47.33 OSA (OBSTRUCTIVE SLEEP APNEA): ICD-10-CM

## 2024-02-23 DIAGNOSIS — R20.9 SENSORY DISORDER: ICD-10-CM

## 2024-02-23 DIAGNOSIS — R41.89 BRAIN FOG: ICD-10-CM

## 2024-02-23 DIAGNOSIS — R79.89 DECREASED FREE TESTOSTERONE LEVEL IN MALE: ICD-10-CM

## 2024-02-23 DIAGNOSIS — M54.2 NECK PAIN: Primary | ICD-10-CM

## 2024-02-23 DIAGNOSIS — F98.8 ATTENTION DEFICIT DISORDER, UNSPECIFIED HYPERACTIVITY PRESENCE: ICD-10-CM

## 2024-02-23 DIAGNOSIS — R41.3 SHORT-TERM MEMORY LOSS: ICD-10-CM

## 2024-02-23 PROCEDURE — 99214 OFFICE O/P EST MOD 30 MIN: CPT | Performed by: NURSE PRACTITIONER

## 2024-02-23 PROCEDURE — 72052 X-RAY EXAM NECK SPINE 6/>VWS: CPT

## 2024-02-23 RX ORDER — BACLOFEN 10 MG/1
10 TABLET ORAL NIGHTLY PRN
Qty: 60 TABLET | Refills: 0 | Status: SHIPPED | OUTPATIENT
Start: 2024-02-23

## 2024-02-23 NOTE — PROGRESS NOTES
"    Office Visit      Date: 2024   Patient Name: Colin Slater  : 1990   MRN: [id number]     Chief Complaint:    Chief Complaint   Patient presents with    Neck Pain     X 3 months.     Fatigue     chronic       History of Present Illness: Colin Slater is a 33 y.o. male presents for follow up   Complaints of neck pain; poor posture, mild pain with ROM and with extension, muscle tightness. Uses cube pillow for support.   Fatigue; suppose to receive cpap supplies from sleep medicine and has not heard anything regarding his machine, cancelled follow up because he has not received machine   He also had low free testosterone and had an mar with urology. They wanted him to repeat in AM 2 hours upon awakening but he forgot to do this  Continues having issues with his memory. Has mar with neuropsych this summer. Also has mar for an ASD evaluation; was told in the past that he had mild form of asperger syndrome   Sensory issues and forgetfulness, hx of multiple concussions. MRI normal (MRI Brain Without Contrast (10/20/2022 19:17). Follows psych for ADD.     Subjective      Review of Systems:   Pertinent ROS noted in HPI.     I have reviewed the patients family history, social history, past medical history, past surgical history and have updated it as appropriate.     Medications:     Current Outpatient Medications:     Diclofenac Sodium (VOLTAREN) 1 % gel gel, Apply 4 g topically to the appropriate area as directed 4 (Four) Times a Day As Needed (joint pain)., Disp: 350 g, Rfl: 1    fluticasone (FLONASE) 50 MCG/ACT nasal spray, 2 sprays into the nostril(s) as directed by provider Daily., Disp: 16 g, Rfl: 5    ketoconazole (NIZORAL) 2 % cream, Apply 1 application  topically to the appropriate area as directed Daily As Needed for Itching., Disp: 14 g, Rfl: 1    Vyvanse 20 MG capsule, , Disp: , Rfl:     Allergies:   Allergies   Allergen Reactions    Hydrocodone Hallucinations     Patient states that he had \"mild " "hallucinations\" with this medication    Apple Juice Other (See Comments)     Diarrhea/constipation    Banana Other (See Comments)     Diarrhea/constipation       Objective     Physical Exam  Physical Exam  Constitutional:       Appearance: Normal appearance.   HENT:      Head: Normocephalic and atraumatic.   Eyes:      Extraocular Movements: Extraocular movements intact.   Neck:      Comments: Pain with left rotation and extension   Negative Spurling test   Cardiovascular:      Rate and Rhythm: Normal rate.   Pulmonary:      Effort: Pulmonary effort is normal.   Musculoskeletal:         General: Normal range of motion.      Cervical back: Normal range of motion. No torticollis. Pain with movement, spinous process tenderness and muscular tenderness present. Normal range of motion.   Neurological:      General: No focal deficit present.      Mental Status: He is alert and oriented to person, place, and time.   Psychiatric:         Mood and Affect: Mood is anxious.         Speech: Speech normal.         Behavior: Behavior normal.         Cognition and Memory: Memory is impaired.         Judgment: Judgment normal.         Vital Signs:   Vitals:    02/23/24 1416   BP: 134/80   Pulse: 83   Temp: 99.1 °F (37.3 °C)   SpO2: 98%   Weight: 88.5 kg (195 lb)   Height: 167.6 cm (66\")     Body mass index is 31.47 kg/m².      Assessment / Plan      Assessment/Plan:   Problem List Items Addressed This Visit          Mental Health    Attention deficit disorder       Sleep    PRINCESS (obstructive sleep apnea)     Other Visit Diagnoses       Neck pain    -  Primary    Relevant Orders    XR spine cervical complete w flex ext    Brain fog        Short-term memory loss        History of multiple concussions        Sensory disorder        Decreased free testosterone level in male        Chronic fatigue                Neck Pain  XR cervical spine w/ flexion and extension   ROM exercises/stretches, NSAIDs, heat, massage  Proper pillow with good " support  Will notify with XR results, f/u if persists/worsens     PRINCESS  Msg sent to sleep medicine provider to check on cpap supplies. Discussed this can contribute to fatigue.    Decreased testosterone level  Follow up urology for repeat labs     ADD, Brain fog, Memory issue, sensory disorder, chronic fatigue  Follow up Psychiatry for ADD  Keep appointment to establish with Neuropsychology- call to get on cancellation list mar is not until June  Check on CPAP supplies; untreated PRINCESS can cause fatigue, memory loss, brain fog  Ensure adequate sleep 7-9 hours per night, exercise regularly, adequate hydration >64 fluid oz per day, stress management.  Keep a book or reminder list on phone to remind self of appointments and obligations   Establish and keep a routine     Follow Up:   Return for Annual.      Flor TRIPATHI  [facility] [place]

## 2024-02-24 DIAGNOSIS — J30.1 SEASONAL ALLERGIC RHINITIS DUE TO POLLEN: ICD-10-CM

## 2024-02-24 RX ORDER — FLUTICASONE PROPIONATE 50 MCG
2 SPRAY, SUSPENSION (ML) NASAL DAILY
Qty: 16 G | Refills: 5 | Status: SHIPPED | OUTPATIENT
Start: 2024-02-24

## 2024-02-24 NOTE — TELEPHONE ENCOUNTER
Rx Refill Note  Requested Prescriptions     Pending Prescriptions Disp Refills    fluticasone (FLONASE) 50 MCG/ACT nasal spray 16 g 5     Si sprays into the nostril(s) as directed by provider Daily.      Last office visit with prescribing clinician: 2024   Last telemedicine visit with prescribing clinician: Visit date not found   Next office visit with prescribing clinician: 5/10/2024       Kelly Romero MA  24, 10:41 EST

## 2024-02-24 NOTE — PROGRESS NOTES
XR is normal. Pain is more from poor posture which is causing muscle tightness/spasm. Work on correcting posture. I'm sending a muscle relaxer to use at night as needed. Recommend heat 15-20 minutes every few hours prn, daily neck stretches, NSAIDs prn, TENs unit, proper pillow, at home traction devices. Can refer to a physical therapist if conservative measures are not beneficial

## 2024-02-26 ENCOUNTER — TELEPHONE (OUTPATIENT)
Dept: SLEEP MEDICINE | Facility: HOSPITAL | Age: 34
End: 2024-02-26
Payer: OTHER GOVERNMENT

## 2024-02-26 NOTE — TELEPHONE ENCOUNTER
----- Message from RENETTA Thomas sent at 2/26/2024 10:33 AM EST -----  Regarding: RE: cpap suppies  Per Fidelia at [facility] -  patient declined to be setup / waiting on new insurance coverage to be available first of the year.  Fidelia will call patient to follow up.  ----- Message -----  From: Alley Vazquez  Sent: 2/26/2024  10:11 AM EST  To: RENETTA Thomas  Subject: FW: cpap suppies                                   ----- Message -----  From: Coleman Enriquez APRN  Sent: 2/26/2024  10:10 AM EST  To: [facility]  Subject: FW: cpap suppies                                   ----- Message -----  From: Flor Lindo APRN  Sent: 2/23/2024   2:29 PM EST  To: DEUCE Serrano  Subject: cpap suppies                                     Devin,    Saw Colin today and he has never received or heard about his CPAP supplies. Do you mind to check on this?

## 2024-05-01 DIAGNOSIS — J30.1 SEASONAL ALLERGIC RHINITIS DUE TO POLLEN: ICD-10-CM

## 2024-05-02 RX ORDER — FLUTICASONE PROPIONATE 50 MCG
2 SPRAY, SUSPENSION (ML) NASAL DAILY
Qty: 16 G | Refills: 5 | Status: SHIPPED | OUTPATIENT
Start: 2024-05-02

## 2024-05-03 ENCOUNTER — OFFICE VISIT (OUTPATIENT)
Dept: INTERNAL MEDICINE | Facility: CLINIC | Age: 34
End: 2024-05-03
Payer: OTHER GOVERNMENT

## 2024-05-03 VITALS
SYSTOLIC BLOOD PRESSURE: 136 MMHG | DIASTOLIC BLOOD PRESSURE: 84 MMHG | WEIGHT: 195.8 LBS | BODY MASS INDEX: 31.47 KG/M2 | OXYGEN SATURATION: 98 % | TEMPERATURE: 97.1 F | HEART RATE: 96 BPM | HEIGHT: 66 IN

## 2024-05-03 DIAGNOSIS — A08.4 VIRAL GASTROENTERITIS: Primary | ICD-10-CM

## 2024-05-03 DIAGNOSIS — J30.1 SEASONAL ALLERGIC RHINITIS DUE TO POLLEN: ICD-10-CM

## 2024-05-03 PROCEDURE — 99213 OFFICE O/P EST LOW 20 MIN: CPT | Performed by: NURSE PRACTITIONER

## 2024-05-03 RX ORDER — FEXOFENADINE HCL 60 MG/1
60 TABLET, FILM COATED ORAL DAILY
Qty: 90 TABLET | Refills: 3 | Status: SHIPPED | OUTPATIENT
Start: 2024-05-03

## 2024-05-03 NOTE — PROGRESS NOTES
"  Office Visit      Patient Name: Colin Slater  : 1990   MRN: [id number]   Care Team: Patient Care Team:  Flor Lindo APRN as PCP - General (Family Medicine)  Indy Mcdaniel MD as Surgeon (General Surgery)    Chief Complaint  Diarrhea, Fever, and Allergies (Requesting allergy medication to be prescribed. )    Subjective     Subjective      Colin Sltaer presents to [facility] for diarrhea and allergy symptoms.   Diarrhea started yesterday.   It was associated with low grade fever,   Denies congestion, abdominal pain, nausea, vomiting,   Symptoms are improving today, now just fatigued and feeling dehydrated.   Needing a work note.  He did take Imodium which seemed to make his symptoms worse.  He is also requesting Allegra for his allergies.  Suffers from the hypertension and has done some research on this would be the best allergy medication for him.  He deals with seasonal allergies and is scheduled to see ENT soon.  No worsening symptoms as of now.    Objective     Objective   Vital Signs:   /84   Pulse 96   Temp 97.1 °F (36.2 °C) (Tympanic)   Ht 167.6 cm (66\")   Wt 88.8 kg (195 lb 12.8 oz)   SpO2 98%   BMI 31.60 kg/m²     Physical Exam  Vitals and nursing note reviewed.   Constitutional:       General: He is not in acute distress.     Appearance: Normal appearance. He is not toxic-appearing.   Eyes:      Pupils: Pupils are equal, round, and reactive to light.   Neck:      Vascular: No carotid bruit.   Cardiovascular:      Rate and Rhythm: Normal rate and regular rhythm.      Heart sounds: Normal heart sounds. No murmur heard.  Pulmonary:      Effort: Pulmonary effort is normal. No respiratory distress.      Breath sounds: Normal breath sounds. No wheezing.   Abdominal:      General: Bowel sounds are normal. There is no distension.      Palpations: Abdomen is soft.      Tenderness: There is abdominal tenderness (Mild left upper quadrant palpation). "   Musculoskeletal:         General: Normal range of motion.      Cervical back: Neck supple. No tenderness.   Skin:     General: Skin is warm and dry.      Findings: No rash.   Neurological:      General: No focal deficit present.      Mental Status: He is alert.   Psychiatric:         Mood and Affect: Mood normal.         Behavior: Behavior normal.          Assessment / Plan      Assessment & Plan   Problem List Items Addressed This Visit    None  Visit Diagnoses       Viral gastroenteritis    -  Primary    Discussed likely viral and self-limiting.  No work provided.  Recommend avoidance of Imodium and discussed reasoning.  Oral hydration and rest encouraged.    Seasonal allergic rhinitis due to pollen      Allegra daily for management, keep scheduled follow-up with the ENT.            Follow Up   Return if symptoms worsen or fail to improve.  Patient was given instructions and counseling regarding his condition or for health maintenance advice. Please see specific information pulled into the AVS if appropriate.     DEUCE Graff  [facility]

## 2024-05-06 ENCOUNTER — TELEPHONE (OUTPATIENT)
Dept: INTERNAL MEDICINE | Facility: CLINIC | Age: 34
End: 2024-05-06
Payer: OTHER GOVERNMENT

## 2024-05-31 ENCOUNTER — HOSPITAL ENCOUNTER (OUTPATIENT)
Facility: HOSPITAL | Age: 34
Discharge: HOME | End: 2024-05-31
Admitting: NURSE PRACTITIONER
Payer: OTHER GOVERNMENT

## 2024-05-31 DIAGNOSIS — S86.001D INJURY OF RIGHT ACHILLES TENDON, SUBSEQUENT ENCOUNTER: ICD-10-CM

## 2024-05-31 DIAGNOSIS — R22.9 LOCALIZED SOFT TISSUE SWELLING: ICD-10-CM

## 2024-05-31 DIAGNOSIS — M76.61 ACHILLES TENDINITIS, RIGHT LEG: ICD-10-CM

## 2024-05-31 PROCEDURE — 73721 MRI JNT OF LWR EXTRE W/O DYE: CPT

## 2024-06-20 ENCOUNTER — TELEPHONE (OUTPATIENT)
Dept: INTERNAL MEDICINE | Facility: CLINIC | Age: 34
End: 2024-06-20
Payer: OTHER GOVERNMENT

## 2024-06-20 NOTE — TELEPHONE ENCOUNTER
Spoke with Cindy TRIPATHI In regards to this as Pt contacted the office again. We didn't see a refill or anything so we're not sure what orders he is referring too but I did see the note from the office that cancelled his appointment. Cindy provided him with the number for this office.

## 2024-06-20 NOTE — TELEPHONE ENCOUNTER
Caller: Colin Slater    Relationship: Self    Best call back number: [phone]     Any additional details: STATES HE WAS REFERRED FOR SCANS AT [facility], AND DOES NOT KNOW WHO THE DOCTOR DOING THESE, STATES IT WAS CANCELLED DUE TO STAFF ISSUE AND HAS NOT RECEIVED A CALL BACK, PLEASE ADVISE    NOT SURE WHO TO CALL

## 2024-06-28 ENCOUNTER — OFFICE VISIT (OUTPATIENT)
Dept: INTERNAL MEDICINE | Facility: CLINIC | Age: 34
End: 2024-06-28
Payer: OTHER GOVERNMENT

## 2024-06-28 VITALS
TEMPERATURE: 98.4 F | DIASTOLIC BLOOD PRESSURE: 80 MMHG | SYSTOLIC BLOOD PRESSURE: 138 MMHG | WEIGHT: 196 LBS | OXYGEN SATURATION: 98 % | BODY MASS INDEX: 31.5 KG/M2 | HEART RATE: 95 BPM | HEIGHT: 66 IN

## 2024-06-28 DIAGNOSIS — M67.971 ACHILLES TENDON DISORDER, RIGHT: Primary | ICD-10-CM

## 2024-06-28 DIAGNOSIS — R53.82 CHRONIC FATIGUE: ICD-10-CM

## 2024-06-28 DIAGNOSIS — R20.9 SENSORY DISORDER: ICD-10-CM

## 2024-06-28 DIAGNOSIS — F84.0 AUTISTIC DISORDER: ICD-10-CM

## 2024-06-28 DIAGNOSIS — R79.89 DECREASED FREE TESTOSTERONE LEVEL IN MALE: ICD-10-CM

## 2024-06-28 DIAGNOSIS — F98.8 ATTENTION DEFICIT DISORDER, UNSPECIFIED HYPERACTIVITY PRESENCE: ICD-10-CM

## 2024-06-28 DIAGNOSIS — G47.33 OSA (OBSTRUCTIVE SLEEP APNEA): ICD-10-CM

## 2024-06-28 DIAGNOSIS — R41.89 BRAIN FOG: ICD-10-CM

## 2024-06-28 DIAGNOSIS — R41.3 SHORT-TERM MEMORY LOSS: ICD-10-CM

## 2024-06-28 DIAGNOSIS — S93.491D SPRAIN OF ANTERIOR TALOFIBULAR LIGAMENT OF RIGHT ANKLE, SUBSEQUENT ENCOUNTER: ICD-10-CM

## 2024-06-28 NOTE — PROGRESS NOTES
Chief Complaint   Patient presents with    Lower Extremity Issue     Right ankle issues     Subjective   Colin Slater is a 34 y.o. male presents for follow-up for achilles tendon pain on the right, ADHD, recently diagnosed with autism disorder with associated symptoms of brain fog, short-term memory loss, sensory disorder, PRINCESS with CPAP use, chronic fatigue, decreased testosterone  MRI of right ankle demonstrated a sprain of right talofibular ligament.  Patient does not have any pain in this area of his foot or ankle.  The pain is located in the right Achilles tendon and it feels inflamed.  Radiologist did not know anything about his Achilles tendon on the MRI imaging.  He continues to deal with stiffness, pain.  Following psychiatry for ADHD  Recently diagnosed with autism spectrum disorder. [facility] in [place] is the clinic he went to for evaluation.  Will request records.  He had an appointment with neuropsychiatrist in [place] but this was canceled due to provider being unavailable, they did not reschedule him as they were unsure when they could get him back in for an appointment.  He is requesting a new referral to a different location.  He is wanting to see a neuropsychiatrist due to sensory issues, issues with fine motor skills, writing and just wanted a further evaluation of his issues.  He had an MRI of his brain in 2022 that was normal.  He has been using his CPAP but is having issues expelling air when he is breathing and he has a hard time falling asleep after he wakes up with a mask on his face.  He has an appointment with the pulmonologist coming  History of low free testosterone level, he was referred to a urologist and they wanted him to repeat testosterone 2 hours upon awakening but he has not completed this yet.  I provided the phone number to the urologist but he lost it since his last appointment    The following portions of the patient's history were reviewed and updated as  "appropriate: allergies, current medications, past family history, past medical history, past social history, past surgical history and problem list.    Objective   /80   Pulse 95   Temp 98.4 °F (36.9 °C)   Ht 167.6 cm (66\")   Wt 88.9 kg (196 lb)   SpO2 98%   BMI 31.64 kg/m²   Body mass index is 31.64 kg/m².       Physical Exam  Constitutional:       Appearance: Normal appearance.   HENT:      Head: Normocephalic and atraumatic.   Eyes:      Extraocular Movements: Extraocular movements intact.   Cardiovascular:      Rate and Rhythm: Normal rate.   Pulmonary:      Effort: Pulmonary effort is normal.   Musculoskeletal:         General: Normal range of motion.      Cervical back: Normal range of motion.   Feet:      Comments: Inflammation and tenderness to right Achilles tendon  Neurological:      General: No focal deficit present.      Mental Status: He is alert and oriented to person, place, and time.   Psychiatric:         Mood and Affect: Mood normal.         Behavior: Behavior normal.         Thought Content: Thought content normal.         Judgment: Judgment normal.         MRI Ankle Right Without Contrast    Result Date: 6/4/2024  Impression: Unremarkable appearance of the Achilles tendon. Prior/remote sprain of anterior talofibular ligament. Otherwise unremarkable ankle MRI. Electronically Signed: Saul Wallace MD  6/4/2024 10:37 AM EDT  Workstation ID: [id number]       Assessment & Plan   Colin Slater is here today and the following problems have been addressed:      Diagnoses and all orders for this visit:    1. Achilles tendon disorder, right (Primary)  -     Ambulatory Referral to Physical Therapy  -     Ambulatory Referral to Podiatry    2. Sprain of anterior talofibular ligament of right ankle, subsequent encounter    3. Attention deficit disorder, unspecified hyperactivity presence  -     Ambulatory Referral to Neuropsychology    4. Autistic disorder  -     Ambulatory Referral to " Neuropsychology    5. Brain fog  -     Ambulatory Referral to Neuropsychology    6. Short-term memory loss  -     Ambulatory Referral to Neuropsychology    7. Sensory disorder  -     Ambulatory Referral to Neuropsychology    8. Chronic fatigue  -     Ambulatory Referral to Neuropsychology    9. PRINCESS (obstructive sleep apnea)    10. Decreased free testosterone level in male      Achilles tendon disorder, right  Referral to physical therapy and to podiatry  Patient is to take his CD images to the podiatry appointment  He is not having any pain or issues with the anterior talofibular ligament    Continue following psychiatry for ADHD and medication management  Recently diagnosed with autism disorder, records requested from [facility] in [place]  In combination with patient's ADHD and autism disorder this explains a lot of symptoms such as brain fog, short-term memory loss, sensory issues  Will place a new referral to neuropsychiatrist to establish care and for further evaluation    Follow-up with pulmonology for PRINCESS with CPAP, continue CPAP use    Follow-up with urology for low testosterone    Follow Up   Return if symptoms worsen or fail to improve.  Patient was given instructions and counseling regarding his condition or for health maintenance advice. Please see specific information pulled into the AVS if appropriate.     Flor TRIPATHI  [facility] [place] B[facility] spent 43 minutes caring for Colin Slater on this date of service. This time includes time spent by me in the following activities: preparing for the visit, reviewing tests, performing a medically appropriate examination and/or evaluation, counseling and educating the patient/family/caregiver, ordering medications, tests, or procedures and documenting information in the medical record.      Patient or patient representative verbalized consent for the use of Ambient Listening during the visit with  Flor LANCE  DEUCE Lindo for chart documentation. 6/28/2024  10:23 EDT

## 2024-07-10 NOTE — PROGRESS NOTES
Sleep Clinic Follow Up Note    Chief Complaint  Apnea    Subjective     History of Present Illness (from previous encounter on 11/14/2023):  Colin Slater is a 33 y.o. male who returns for follow-up.  The patient was last seen on 10/28/2022.  He recently had nasal surgery and feels as though this has helped somewhat though he continues to have episodes of apneic symptoms.  Patient last had a sleep study at [facility] on 8/14/2020 in which she was found with mild sleep apnea with an AHI of 11.1/H.  Patient has not started PAP therapy.  I discussed alternatives including MAD, and referral to otolaryngology.  Patient would like to try PAP therapy.  I will place orders for new device and supplies and have asked the patient return for follow-up in compliance in 31-90 days.  I have noted that the patient will need to use the device more than 4 hours a night, more than 70% of the time in order to remain fully compliant.  Additionally, I have noted that occasionally insurance will require a new sleep study in order to reestablish diagnosis.  If this be the case then I will place an order for home sleep test anticipating the need for device following. (End copied text).    Interval History:  Colin Slater is a 34 y.o. male returns for follow up and compliance of PAP therapy. The patient was last seen on 11/14/2023 by me. Overall the patient feels that he improved somewhat with regard to therapy. It helps with falling asleep. He has difficulty with mask fit. He feels improved when he wakes in the am. The device appears to be working appropriately. On average the patient sleeps 5-6 hours per night. The patient wakes 4-5 times briefly due to mask leakage.     The patient reports the following changes to their medical and medication history since they were last seen:    None    Further details are as follows:      [place] Scale is (out of 24): Total score: 12     Weight:  Current Weight: 202 lb    Weight  "change in the last year:  gain: 0 lbs    The patient's relevant past medical, surgical, family, and social history reviewed and updated in Epic as appropriate.    PMH:    Past Medical History:   Diagnosis Date    Acute appendicitis 8/24/2020    ADD (attention deficit disorder)     ADHD (attention deficit hyperactivity disorder)     Asthma     Depression     Moderate single current episode of major depressive disorder 4/24/2017     Past Surgical History:   Procedure Laterality Date    APPENDECTOMY N/A 08/24/2020    Procedure: APPENDECTOMY LAPAROSCOPIC;  Surgeon: Indy Mcdaniel MD;  Location: [facility];  Service: General;  Laterality: N/A;    EAR TUBES      MYRINGOTOMY W/ TUBES      NASAL SEPTUM SURGERY      NOSE SURGERY  04/2023    SEPTOPLASTY      WISDOM TOOTH EXTRACTION      WISDOM TOOTH EXTRACTION         Allergies   Allergen Reactions    Hydrocodone Hallucinations     Patient states that he had \"mild hallucinations\" with this medication    Apple Juice Other (See Comments)     Diarrhea/constipation    Banana Other (See Comments)     Diarrhea/constipation       MEDS:  Prior to Admission medications    Medication Sig Start Date End Date Taking? Authorizing Provider   Diclofenac Sodium (VOLTAREN) 1 % gel gel Apply 4 g topically to the appropriate area as directed 4 (Four) Times a Day As Needed (joint pain). 8/18/23   Flor Lindo APRN   fexofenadine (Allegra Allergy) 60 MG tablet Take 1 tablet by mouth Daily. 5/3/24   Dayna Espinoza APRN   fluticasone (FLONASE) 50 MCG/ACT nasal spray 2 sprays into the nostril(s) as directed by provider Daily. 5/2/24   Flor Lindo APRN   Vyvanse 20 MG capsule  5/24/22   Provider, MD Lety         FH:  Family History   Problem Relation Age of Onset    Diabetes Mother     Diabetes Father     Heart disease Father     Hyperlipidemia Father     Hypertension Father     Thyroid disease Sister     Early death Maternal Grandfather     Lung cancer Paternal Grandmother     " "Cancer Paternal Grandmother     Heart disease Paternal Grandmother     Hyperlipidemia Paternal Grandmother     No Known Problems Paternal Grandfather        Objective   Vital Signs:  /88 (BP Location: Right arm, Patient Position: Sitting, Cuff Size: Adult)   Pulse 98   Ht 167.6 cm (66\")   Wt 91.6 kg (202 lb)   SpO2 97%   BMI 32.60 kg/m²           Physical Exam  Vitals reviewed.   Constitutional:       Appearance: Normal appearance.   HENT:      Head: Normocephalic and atraumatic.      Nose: Nose normal.      Mouth/Throat:      Mouth: Mucous membranes are moist.   Cardiovascular:      Rate and Rhythm: Normal rate and regular rhythm.      Heart sounds: No murmur heard.     No friction rub. No gallop.   Pulmonary:      Effort: Pulmonary effort is normal. No respiratory distress.      Breath sounds: Normal breath sounds. No wheezing or rhonchi.   Neurological:      Mental Status: He is alert and oriented to person, place, and time.   Psychiatric:         Behavior: Behavior normal.               Result Review :           PAP Report:  AHI: 4.9/h  Days of Usage: 68/90 (76%)  Number of Days Greater than 4 hours: 52/90 (58%)  Average time (days used): 4 hours 56 minutes  95th Percentile Pressure: 10.2 cmH2O  95th percentile leaks: 18.8 L/min  Settings: Auto CPAP-8/18 cm H2O, EPR full-time, EPR level 2, response standard.       Assessment and Plan  Colin Slater is a 34 y.o. male who returns for follow-up and compliance of PAP therapy.  The Pap report has been reviewed.  Overall usage is at 76% with compliance and 58%.  I have encouraged increase usage.  Patient averages 4 hours and 56 minutes of therapy.  Sleep apnea is controlled with an AHI of 4.9/H. He was out of town for 2 weeks and wasn't able to use the device. I will refill the patient's supplies, and I have asked them to return for follow-up and compliance in 1 year or sooner should they have further questions or concerns. I will increase the EPR to 3 " as he is having difficulty with exhalation. He is remembering his dreams and is concerned about night terrors. He has PTSD and is followed by psychiatry. He had multiple head injuries and has had difficulty with sensory issues, referred pain, and memory loss. He has been unable to remember faces and names.He was to see [facility] neurology but was canceled as the provider left. I will refer him to neurology for further evaluation.     Diagnoses and all orders for this visit:    1. PRINCESS (obstructive sleep apnea) (Primary)  -     PAP Therapy    2. Short-term memory loss  -     Ambulatory Referral to Neurology    3. Obesity (BMI 30.0-34.9)         The patient continues to use and benefit from PAP therapy.    1. The patient was counseled regarding multimodal approach with healthy nutrition, healthy sleep, regular physical activity, social activities, counseling, and medications. Encouraged to practice lateral sleep position. Avoid alcohol and sedatives close to bedtime.     2.  We will refill supplies x1 year.  Return to clinic 1 year or sooner if symptoms warrant. I have reviewed the results of my evaluation and impression and discussed my recommendations in detail with the patient.           Follow Up  Return in about 1 year (around 7/12/2025) for Annual visit.  Patient was given instructions and counseling regarding his condition or for health maintenance advice. Please see specific information pulled into the AVS if appropriate.       DEUCE Cosme, ACNP-BC  Pulmonology, Critical Care, and Sleep Medicine

## 2024-07-12 ENCOUNTER — OFFICE VISIT (OUTPATIENT)
Dept: SLEEP MEDICINE | Age: 34
End: 2024-07-12
Payer: OTHER GOVERNMENT

## 2024-07-12 VITALS
HEART RATE: 98 BPM | OXYGEN SATURATION: 97 % | BODY MASS INDEX: 32.47 KG/M2 | HEIGHT: 66 IN | DIASTOLIC BLOOD PRESSURE: 88 MMHG | WEIGHT: 202 LBS | SYSTOLIC BLOOD PRESSURE: 130 MMHG

## 2024-07-12 DIAGNOSIS — G47.33 OSA (OBSTRUCTIVE SLEEP APNEA): Primary | ICD-10-CM

## 2024-07-12 DIAGNOSIS — R41.3 SHORT-TERM MEMORY LOSS: ICD-10-CM

## 2024-07-12 DIAGNOSIS — E66.9 OBESITY (BMI 30.0-34.9): ICD-10-CM

## 2024-07-12 PROCEDURE — 99213 OFFICE O/P EST LOW 20 MIN: CPT | Performed by: NURSE PRACTITIONER

## 2024-07-22 ENCOUNTER — TELEPHONE (OUTPATIENT)
Dept: INTERNAL MEDICINE | Facility: CLINIC | Age: 34
End: 2024-07-22
Payer: OTHER GOVERNMENT

## 2024-07-22 DIAGNOSIS — L98.9 SKIN LESION: Primary | ICD-10-CM

## 2024-07-22 NOTE — TELEPHONE ENCOUNTER
"Name: Colin Slater      Relationship: Self      Best Callback Number: [phone]      HUB PROVIDED THE RELAY MESSAGE FROM THE OFFICE  Relay      \"  Please let patient know that we checked into this and the referrals have been faxed, he should hear something within a couple of weeks, sorry for the delay.  The neuropsychology referral was sent to [facility] in [place] today, they do not have a referral department so it may be quicker for patient to contact them and schedule at his convenience, their number is [phone].  If they do not have anything available he can check with the [place] office and their number is [phone].  If they do not take his insurance I will need to place a referral to a different department.   \"          PATIENT: VOICED UNDERSTANDING AND HAS NO FURTHER QUESTIONS AT THIS TIME    ADDITIONAL INFORMATION:    "

## 2024-07-22 NOTE — TELEPHONE ENCOUNTER
"Relay     \"  Please let patient know that we checked into this and the referrals have been faxed, he should hear something within a couple of weeks, sorry for the delay.  The neuropsychology referral was sent to [facility] in [place] today, they do not have a referral department so it may be quicker for patient to contact them and schedule at his convenience, their number is [phone].  If they do not have anything available he can check with the [place] office and their number is [phone].  If they do not take his insurance I will need to place a referral to a different department.   \"           "

## 2024-07-22 NOTE — TELEPHONE ENCOUNTER
Please let patient know that we checked into this and the referrals have been faxed, he should hear something within a couple of weeks, sorry for the delay.  The neuropsychology referral was sent to [facility] in [place] today, they do not have a referral department so it may be quicker for patient to contact them and schedule at his convenience, their number is [phone].  If they do not have anything available he can check with the [place] office and their number is [phone].  If they do not take his insurance I will need to place a referral to a different department.

## 2024-07-22 NOTE — TELEPHONE ENCOUNTER
Caller: Colin Slater    Relationship: Self    Best call back number:  AFTER [phone]  AND DOES NOT KNOW WHEN HE WILL HAVE PHONE ACCESS    What is the medical concern/diagnosis: RIGHT ANKLE PAINM ISSUES WITH WALKING    What specialty or service is being requested: ORTHOPEDICS    PATIENT HASN'T HEARD ANYTHING REGARDING THIS REFERRAL    ALSO PATIENT WAS SUPPOSED TO GET REFERRALS  FOR NEUROPSYCHOLOGY AND ALSO FOR REFERRAL A PLACE UNDER HIS EYE; HE ALSO HASN'T HEARD ON EITHER OF THESE AS WELL

## 2024-07-22 NOTE — TELEPHONE ENCOUNTER
"Referral has been placed to podiatrist.  They can take care of his foot issue he does not need to see Ortho unless they recommend so.    I placed a referral to dermatology today, they should call him within the next couple weeks.    I had previously placed a referral to neuropsychology, there are no updates on this referral.  It says \"transfer of care\".  He is okay with seeing someone in [facility] if someone in [place] is not available.  His appointment with  neuropsychology was canceled due to no longer having a provider so avoid sending referral to [facility].  I have forwarded this to referrals to review and we can get back with him when we get word regarding this referral.  "

## 2024-07-22 NOTE — TELEPHONE ENCOUNTER
I see a referral still being processed for podiatry but was patient also to be referred to ortho? No order in for ortho.

## 2024-07-22 NOTE — TELEPHONE ENCOUNTER
Caller: Colin Slater    Relationship: Self    Best call back number: [phone]  What was the call regarding: PATIENT CALLED STATING THAT THE NEUROPSYCHOLOGY THAT HE WAS REFERRED TO DOES NOT TAKE THE PATIENT'S INSURANCE.  PATIENT ASKED FOR A REFERRED TO ANOTHER PLACE.

## 2024-07-23 NOTE — TELEPHONE ENCOUNTER
If have not already let patient know that his referral has been rerouted due to the place in [place] not taking his insurance.  We are sending him to [facility] in [place].

## 2024-07-26 ENCOUNTER — OFFICE VISIT (OUTPATIENT)
Age: 34
End: 2024-07-26
Payer: OTHER GOVERNMENT

## 2024-07-26 VITALS
HEIGHT: 67 IN | BODY MASS INDEX: 32.02 KG/M2 | WEIGHT: 204 LBS | SYSTOLIC BLOOD PRESSURE: 135 MMHG | DIASTOLIC BLOOD PRESSURE: 80 MMHG

## 2024-07-26 DIAGNOSIS — M25.571 RIGHT ANKLE PAIN, UNSPECIFIED CHRONICITY: Primary | ICD-10-CM

## 2024-07-26 DIAGNOSIS — F17.290 SMOKES CIGARS: ICD-10-CM

## 2024-07-26 DIAGNOSIS — M76.60 ACHILLES TENDON PAIN: ICD-10-CM

## 2024-07-26 DIAGNOSIS — M25.471 RIGHT ANKLE SWELLING: ICD-10-CM

## 2024-07-26 PROCEDURE — 99203 OFFICE O/P NEW LOW 30 MIN: CPT | Performed by: PHYSICIAN ASSISTANT

## 2024-07-26 RX ORDER — PREDNISONE 20 MG/1
5 TABLET ORAL DAILY
COMMUNITY

## 2024-07-26 NOTE — PROGRESS NOTES
[facility] [place] [facility] [place]    Subjective     Chief Complaint   Patient presents with    Right Ankle - Pain        HPI  Colin Slater is a 34 y.o. male.  New patient presents for evaluation of right ankle pain, swelling.  Patient localizes the pain and swelling along the Achilles into the calcaneus and occasionally along the plantar fascia.  Symptoms/pain have been ongoing for over a year.  HAI: No history of injury or trauma that he can recall.  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  States the pain was so bad this past weekend he presented to an urgent care and was prescribed prednisone.  When he attempted to take it he noted increased swelling throughout his body so he discontinued its use.    Pain scale: 5/10.  Severity of the pain moderate.  Quality of the pain dull, aching, throbbing.  Associated symptoms pain, swelling, popping, stiffness.  Activity related to pain walking, standing, stairs, working, certain movements of ankle.  Pain eased by medication.  No reported numbness or tingling.     Denies fever, chills, night sweats or other constitutional symptoms.    Past Medical History:   Diagnosis Date    Acute appendicitis 8/24/2020    ADD (attention deficit disorder)     ADHD (attention deficit hyperactivity disorder)     Asthma     Depression     Moderate single current episode of major depressive disorder 4/24/2017      Past Surgical History:   Procedure Laterality Date    APPENDECTOMY N/A 08/24/2020    Procedure: APPENDECTOMY LAPAROSCOPIC;  Surgeon: Indy Mcdaniel MD;  Location: [facility];  Service: General;  Laterality: N/A;    EAR TUBES      MYRINGOTOMY W/ TUBES      NASAL SEPTUM SURGERY      NOSE SURGERY  2023    SEPTOPLASTY      WISDOM TOOTH EXTRACTION      WISDOM TOOTH EXTRACTION        Family History   Problem Relation Age of Onset    Diabetes Mother     Diabetes Father     Heart disease Father     Hyperlipidemia Father     Hypertension  "Father     Thyroid disease Sister     Early death Maternal Grandfather     Lung cancer Paternal Grandmother     Cancer Paternal Grandmother     Heart disease Paternal Grandmother     Hyperlipidemia Paternal Grandmother     No Known Problems Paternal Grandfather      Social History     Socioeconomic History    Marital status: Single   Tobacco Use    Smoking status: Some Days     Types: Cigars     Passive exposure: Current    Smokeless tobacco: Never    Tobacco comments:     very rarely   Vaping Use    Vaping status: Never Used   Substance and Sexual Activity    Alcohol use: Yes     Alcohol/week: 12.0 standard drinks of alcohol     Types: 6 Cans of beer, 6 Shots of liquor per week     Comment: weekly    Drug use: No    Sexual activity: Yes     Partners: Female      Current Outpatient Medications on File Prior to Visit   Medication Sig Dispense Refill    Diclofenac Sodium (VOLTAREN) 1 % gel gel Apply 4 g topically to the appropriate area as directed 4 (Four) Times a Day As Needed (joint pain). 350 g 1    fexofenadine (Allegra Allergy) 60 MG tablet Take 1 tablet by mouth Daily. 90 tablet 3    fluticasone (FLONASE) 50 MCG/ACT nasal spray 2 sprays into the nostril(s) as directed by provider Daily. 16 g 5    predniSONE (DELTASONE) 20 MG tablet Take 5 mg by mouth Daily. Pt taking on 5 mg      Vyvanse 20 MG capsule        No current facility-administered medications on file prior to visit.      Allergies   Allergen Reactions    Hydrocodone Hallucinations     Patient states that he had \"mild hallucinations\" with this medication    Apple Juice Other (See Comments)     Diarrhea/constipation    Banana Other (See Comments)     Diarrhea/constipation        The following portions of the patient's history were reviewed and updated as appropriate: allergies, current medications, past family history, past medical history, past social history, past surgical history, and problem list.    Review of Systems   Constitutional: Negative.  " "  HENT: Negative.     Eyes: Negative.    Respiratory: Negative.     Cardiovascular: Negative.    Gastrointestinal: Negative.    Endocrine: Negative.    Genitourinary: Negative.    Musculoskeletal:  Positive for arthralgias.   Skin: Negative.    Allergic/Immunologic: Negative.    Neurological: Negative.    Hematological: Negative.    Psychiatric/Behavioral: Negative.     All other systems reviewed and are negative.       Objective      Physical Exam  /80   Ht 168.9 cm (66.5\")   Wt 92.5 kg (204 lb)   BMI 32.43 kg/m²     Body mass index is 32.43 kg/m².    GENERAL APPEARANCE: awake, alert & oriented x 3, in no acute distress and well developed, well nourished  PSYCH: normal mood and affect  LUNGS:  breathing nonlabored, no wheezing  EYES: sclera anicteric, pupils equal  CARDIOVASCULAR: palpable pulses. Capillary refill less than 2 seconds  INTEGUMENTARY: skin intact, no clubbing, cyanosis  NEUROLOGIC:  Normal Sensation         Ortho Exam  V:  Dorsalis Pedis:  Right: 2+; Left:2+    Posterior Tibial: Right:2+; Left:2+    Capillary Refill:  Brisk  MSK:      Tibia:  Right:  non tender and normal motor function      Ankle:  Right: tender along Achilles and its sides into calcaneus, ROM  limited due to pain, motor function  painful, and positive inflammation/swelling along Achilles tendon. No evidence of Achilles defect      Foot:  Right:  tender mildly along PF , ROM  limited, motor function  painful, and no defect palpable to PF      NEURO: Heel Walking:  Right:  painful; Left:  normal    Toe Walking:  Right:  unable to test; Left:  normal     [place]-H[place] 5.07 monofilament test: not evaluated    Lower extremity sensation: intact     Calf Atrophy:none    Motor Function:  4/5 with weakness to right gastroc-soleus complex    Of note patient has no tenderness over ATFL or any of the lateral ligamentous structures.  No instability or laxity with anterior drawer, talar tilt or forced external " rotation.      Imaging/Studies  Ordered right ankle plain films.  Imaging read/interpreted by Dr. English.  Right Ankle Radiographs  Indication: right ankle pain  Views: AP, mortise and lateral of the right ankle     Comparison: no prior studies available for review     Findings:   No acute or chronic bony abnormalities with normal alignment.  Small accessory ossicle posteriorly.      Dr. Stoll and I reviewed MRI performed on 5/31/2024.  MRI ANKLE RIGHT WO CONTRAST     Date of Exam: 5/31/2024 1:15 PM EDT     Indication: Achilles tendon painful, swollen, injury.     Comparison: None available.     Technique:  Routine multiplanar/multisequence images of the right ankle were obtained without contrast administration.        Findings:  A skin marker projects over the posterior ankle overlying the mid Achilles tendon. Normal appearance of the Achilles tendon. No conspicuous tendinosis. No evidence of Achilles tendon tear. No edema of Kager's fat. No retrocalcaneal bursitis. Incidental   note of low-lying soleus, normal variant.     Unremarkable appearance of the muscles and subcutaneous tissues.     Normal bone marrow signal intensity. No stress or traumatic fracture. No evidence of ankle joint effusion. Normal alignment of the visualized articulations. The ankle mortise joint space is symmetric and preserved on this static, nonweightbearing exam.   The talar dome is normal.     The syndesmotic ligaments are normal. There is some increased signal within the anterior talofibular ligament compatible with sequelae of remote sprain. The remaining lateral ankle ligaments are normal. The deltoid ligament complex and spring ligament   complex appear normal. The interosseous Lisfranc ligament is intact.     The anterior extensor tendons, medial flexor tendons, and peroneal tendons are normal without tendon tear, tendinosis, or tenosynovitis. The ligaments and fat of the sinus tarsi appear normal. Unremarkable appearance of the  tarsal tunnel. Normal   appearance of the plantar fascia.     IMPRESSION:  Impression:  Unremarkable appearance of the Achilles tendon.     Prior/remote sprain of anterior talofibular ligament.     Otherwise unremarkable ankle MRI.           Electronically Signed: Saul Wallace MD    6/4/2024 10:37 AM EDT    Workstation ID: [id number]  Assessment/Plan        ICD-10-CM ICD-9-CM   1. Right ankle pain, unspecified chronicity  M25.571 719.47   2. Achilles tendon pain  M76.60 727.89   3. Right ankle swelling  M25.471 719.07   4. Smokes cigars  F17.290 305.1       Orders Placed This Encounter   Procedures    XR Ankle 3+ View Right        -Right ankle pain, Achilles pain, swelling without evidence of defect to the Achilles.  -Reviewed imaging with the patient to include plain films and MRI.  Also reviewed MRI with Dr. Stoll.  -At this time recommend treating patient for Achilles tendinitis.  -Provided patient with a night splint.  -Because of the increased swelling caused by the prednisone patient had to stop that medication. States he does well with OTC ibuprofen which he will continue.  Recommend he take the ibuprofen scheduled over the next couple of weeks to help with the inflammation and swelling.  He may add Tylenol and as needed.  -Discussed ice and elevation as well as compression socks to help with inflammation and swelling.  -Referred patient to PT.  Showed the patient the 2 stretches to do at home (in addition to what PT shows them), and recommend they do them 5 reps, 6-8 times per day.  I explained that injections and orthotics will not help.    -This conservative treatment needs to be tried for minimum of 3 months, if that does not work then recommend proceeding with short leg walking cast for 6 weeks for complete soft tissue rest.  -Cigar use--patient states he only smokes a few cigars a month.  Did explain the importance of tobacco cessation and encouraged him to stop cigar use.  If he needs medications to  help assist he can contact his PCP.  -Follow up in 4 months for repeat evaluation, sooner if issues arise or symptoms worsen/change.  -Questions and concerns answered.      Medical Decision Making  Management Options : over-the-counter medicine and physical/occupational therapy  Data/Risk: radiology tests      Nasreen Ruffin PA-C  07/29/24  08:21 EDT               EMR Dragon/Transcription disclaimer:  Much of this encounter note is an electronic transcription of spoken language to printed text. Electronic transcription of spoken language may permit erroneous, or at times, nonsensical words or phrases to be inadvertently transcribed. Although I have reviewed the note for such errors, some may still exist.

## 2024-07-29 DIAGNOSIS — M25.571 RIGHT ANKLE PAIN, UNSPECIFIED CHRONICITY: ICD-10-CM

## 2024-07-29 DIAGNOSIS — M76.60 ACHILLES TENDON PAIN: ICD-10-CM

## 2024-07-29 DIAGNOSIS — M25.471 RIGHT ANKLE SWELLING: Primary | ICD-10-CM

## 2024-07-30 DIAGNOSIS — F51.4 NIGHT TERROR: Primary | ICD-10-CM

## 2024-07-30 DIAGNOSIS — R44.9 SENSORY DEFICIT PRESENT: ICD-10-CM

## 2024-08-09 ENCOUNTER — OFFICE VISIT (OUTPATIENT)
Dept: INTERNAL MEDICINE | Facility: CLINIC | Age: 34
End: 2024-08-09
Payer: OTHER GOVERNMENT

## 2024-08-09 VITALS
OXYGEN SATURATION: 97 % | WEIGHT: 198 LBS | HEIGHT: 66 IN | TEMPERATURE: 98 F | SYSTOLIC BLOOD PRESSURE: 116 MMHG | RESPIRATION RATE: 20 BRPM | HEART RATE: 92 BPM | DIASTOLIC BLOOD PRESSURE: 74 MMHG | BODY MASS INDEX: 31.82 KG/M2

## 2024-08-09 DIAGNOSIS — T75.3XXA SEA SICKNESS, INITIAL ENCOUNTER: Primary | ICD-10-CM

## 2024-08-09 DIAGNOSIS — A09 TRAVELER'S DIARRHEA: ICD-10-CM

## 2024-08-09 PROCEDURE — 99213 OFFICE O/P EST LOW 20 MIN: CPT | Performed by: PHYSICIAN ASSISTANT

## 2024-08-09 RX ORDER — SCOLOPAMINE TRANSDERMAL SYSTEM 1 MG/1
1 PATCH, EXTENDED RELEASE TRANSDERMAL
Qty: 4 PATCH | Refills: 0 | Status: SHIPPED | OUTPATIENT
Start: 2024-08-09

## 2024-08-09 RX ORDER — AZITHROMYCIN 500 MG/1
500 TABLET, FILM COATED ORAL DAILY
Qty: 3 TABLET | Refills: 0 | Status: SHIPPED | OUTPATIENT
Start: 2024-08-09 | End: 2024-08-12

## 2024-08-09 NOTE — PROGRESS NOTES
"     Office Visit      Patient Name: Colin Slater  : 1990   MRN: [id number]     Chief Complaint:    Chief Complaint   Patient presents with    Med Refill     Is going to [place], wants to discuss meds and vaccines        History of Present Illness: Colin Slater is a 34 y.o. male who is here today to discuss upcoming travel.    He will be traveling to [place] and wants to discuss any necessary vaccinations or medications he should take with him. [place] but otherwise will be staying in more developed areas.     He is up to date on immunizations including hepatitis A which is not on record as it was administered by the .     Subjective      I have reviewed and the following portions of the patient's history were updated as appropriate: past family history, past medical history, past social history, past surgical history and problem list.      Current Outpatient Medications:     fexofenadine (Allegra Allergy) 60 MG tablet, Take 1 tablet by mouth Daily., Disp: 90 tablet, Rfl: 3    fluticasone (FLONASE) 50 MCG/ACT nasal spray, 2 sprays into the nostril(s) as directed by provider Daily., Disp: 16 g, Rfl: 5    Vyvanse 20 MG capsule, , Disp: , Rfl:     azithromycin (ZITHROMAX) 500 MG tablet, Take 1 tablet by mouth Daily for 3 days., Disp: 3 tablet, Rfl: 0    Scopolamine 1 MG/3DAYS patch, Place 1 patch on the skin as directed by provider Every 72 (Seventy-Two) Hours., Disp: 4 patch, Rfl: 0    Allergies   Allergen Reactions    Hydrocodone Hallucinations     Patient states that he had \"mild hallucinations\" with this medication    Apple Juice Other (See Comments)     Diarrhea/constipation    Banana Other (See Comments)     Diarrhea/constipation       Objective     Vital Signs:   Vitals:    24 1259   BP: 116/74   Pulse: 92   Resp: 20   Temp: 98 °F (36.7 °C)   SpO2: 97%   Weight: 89.8 kg (198 lb)   Height: 167.6 cm (66\")     Body mass index is 31.96 kg/m².    Physical " Exam  Vitals and nursing note reviewed.   Constitutional:       General: He is not in acute distress.     Appearance: He is well-developed. He is not ill-appearing, toxic-appearing or diaphoretic.   HENT:      Head: Normocephalic and atraumatic.      Right Ear: External ear normal.      Left Ear: External ear normal.   Eyes:      General: No scleral icterus.     Extraocular Movements: Extraocular movements intact.      Conjunctiva/sclera: Conjunctivae normal.      Pupils: Pupils are equal, round, and reactive to light.   Cardiovascular:      Rate and Rhythm: Normal rate and regular rhythm.      Heart sounds: Normal heart sounds. No murmur heard.     No friction rub. No gallop.   Pulmonary:      Effort: Pulmonary effort is normal. No respiratory distress.      Breath sounds: Normal breath sounds. No wheezing, rhonchi or rales.   Chest:      Chest wall: No tenderness.   Musculoskeletal:         General: No tenderness or deformity. Normal range of motion.      Cervical back: Normal range of motion and neck supple.      Right lower leg: No edema.      Left lower leg: No edema.   Skin:     General: Skin is warm and dry.      Capillary Refill: Capillary refill takes less than 2 seconds.      Coloration: Skin is not jaundiced or pale.      Findings: No rash.   Neurological:      Mental Status: He is alert and oriented to person, place, and time.      Cranial Nerves: No cranial nerve deficit.      Sensory: No sensory deficit.      Motor: No abnormal muscle tone.      Coordination: Coordination normal.      Gait: Gait normal.      Deep Tendon Reflexes: Reflexes normal.   Psychiatric:         Mood and Affect: Mood normal.         Behavior: Behavior normal.         Thought Content: Thought content normal.         Judgment: Judgment normal.               Assessment / Plan      Assessment/Plan:   Diagnoses and all orders for this visit:    1. Sea sickness, initial encounter (Primary)  -     Scopolamine 1 MG/3DAYS patch; Place 1  patch on the skin as directed by provider Every 72 (Seventy-Two) Hours.  Dispense: 4 patch; Refill: 0    2. Traveler's diarrhea  -     azithromycin (ZITHROMAX) 500 MG tablet; Take 1 tablet by mouth Daily for 3 days.  Dispense: 3 tablet; Refill: 0       Advised aggressive rehydration with electrolyte replacement if diarrhea occurs. Discussed that azithromycin is not treatment for all types of traveler's diarrhea but may be used if needed while awaiting medical care.         Follow Up:   No follow-ups on file.    Patient was given instructions and counseling regarding his condition or for health maintenance advice. Please see specific information pulled into the AVS if appropriate.     Mirna Santo PA-C  [facility] [address] [facility]     Please note that portions of this note may have been completed with a voice recognition program.

## 2024-09-12 DIAGNOSIS — G47.33 OBSTRUCTIVE SLEEP APNEA, ADULT: ICD-10-CM

## 2024-09-12 DIAGNOSIS — G47.33 OSA (OBSTRUCTIVE SLEEP APNEA): Primary | ICD-10-CM

## 2024-10-03 ENCOUNTER — TREATMENT (OUTPATIENT)
Dept: PHYSICAL THERAPY | Facility: CLINIC | Age: 34
End: 2024-10-03
Payer: OTHER GOVERNMENT

## 2024-10-03 DIAGNOSIS — M76.61 ACHILLES TENDINITIS OF RIGHT LOWER EXTREMITY: Primary | ICD-10-CM

## 2024-10-03 NOTE — PROGRESS NOTES
Physical Therapy Initial Evaluation and Plan of Care   [address] [place] [address]      Patient: Colin Slater   : 1990  Diagnosis/ICD-10 Code:  Achilles tendinitis of right lower extremity [M76.61]  Referring practitioner: DEUCE Turpin    Subjective Evaluation    History of Present Illness  Date of onset: 1/3/2024  Mechanism of injury: Pt reports having R achilles tendon pain for the last 2 years worse over the last 9 months. Pt reports that he began walking 6-7 miles per shift with about 30 lbs of equipment. Pt reports that his pain comes and goes. Pt reports that running makes pain significantly worse. Pt reports that stairs go okay. 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 Pt reports that he has a brace that he is supposed to wear at night although it was making him lose sleep. Pt reports that he gets swelling behind the achilles when it is flared up.       Patient Occupation: [employer]  Current pain ratin  At best pain ratin  Quality: dull ache, tight and burning  Relieving factors: rest  Aggravating factors: ambulation (running)  Progression: worsening    Social Support  Lives in: multiple-level home    Diagnostic Tests  MRI studies: normal    Treatments  Previous treatment: medication  Patient Goals  Patient goals for therapy: decreased pain, increased motion and increased strength           Objective          Palpation   Left   No palpable tenderness to the lateral gastrocnemius, medial gastrocnemius and posterior tibialis.     Right   No palpable tenderness to the lateral gastrocnemius. Tenderness of the medial gastrocnemius and posterior tibialis.     Tenderness     Right Ankle/Foot   Tenderness in the Achilles insertion, peroneal tendon, posterior tibial tendon and proximal Achilles.     Neurological Testing     Reflexes   Left   Patellar (L4): normal (2+)  Achilles (S1): normal (2+)    Right   Patellar (L4): normal  (2+)  Achilles (S1): normal (2+)    Active Range of Motion   Left Ankle/Foot   Dorsiflexion (ke): WFL  Plantar flexion: WFL  Inversion: WFL  Eversion: WFL    Additional Active Range of Motion Details  DF +5 from neutral in R ankle.     Strength/Myotome Testing     Right Ankle/Foot   Dorsiflexion: 5  Plantar flexion: 4  Inversion: 4  Eversion: 5    Tests       Thoracic   Negative slump.     Lumbar     Left   Negative passive SLR.     Right   Negative passive SLR.     Ambulation     Observational Gait   Gait: antalgic           Assessment & Plan       Assessment  Impairments: abnormal gait, abnormal or restricted ROM, activity intolerance, impaired physical strength, lacks appropriate home exercise program, pain with function and weight-bearing intolerance   Functional limitations: walking, uncomfortable because of pain, standing and stooping (Running  )  Assessment details: Patient is a 34 year old male who comes to physical therapy with R achilles pain. Signs and symptoms are consistent with R achilles tendonitis resulting in pain, decreased ROM, decreased strength, and inability to perform all essential functional activities. Pt will benefit from skilled PT services to address the above issues.       Prognosis: good    Goals  Plan Goals: SHORT TERM GOALS:     2 weeks  1. Pt independent with HEP   2. Pt to demonstrate ability to ambulate in the clinic without antalgic gait noted  3. Pt to demonstrate ability to perform single leg heel raise on the right without increase in pain      LONG TERM GOALS:   6 weeks  1. Pt to demonstrate ability to perform full functional squat with good form and no increase in pain in the right foot/ankle  2. Pt to demonstrate ability to ambulate on TM for 10 minutes with normal gait pattern without increase in pain  3. Pt to report being able to work full shift without increase in pain in the right ankle/foot  4. Pt will be able to run 1.5 miles with no more that 2/10 pain in the R  achilles.         Plan  Therapy options: will be seen for skilled therapy services  Planned modality interventions: cryotherapy, dry needling and thermotherapy (hydrocollator packs)  Planned therapy interventions: flexibility, functional ROM exercises, home exercise program, joint mobilization, manual therapy, motor coordination training, soft tissue mobilization, strengthening, stretching and therapeutic activities  Frequency: 2x week  Duration in weeks: 6  Treatment plan discussed with: patient        Timed Treatment:  Manual Therapy:         mins  20595;  Therapeutic Exercise:         mins  98126;     Neuromuscular Kathya:        mins  85959;    Therapeutic Activity:          mins  10596;     Gait Training:           mins  64446;     Ultrasound:          mins  88271;    Electrical Stimulation:         mins  79471 ( );  Dry Needling          mins self-pay  Iontophoresis          mins 98171    Untimed Treatments:  Electrical Stimulation:         mins  74489 ( );  Dry Needling:                     mins  Ultrasound:                         mins  72124;      Timed Treatment:      mins   Total Treatment:     35   mins    PT SIGNATURE: KENDY Smith License: [id number]  DATE TREATMENT INITIATED: 10/3/2024    Initial Certification  Certification Period: 12/31/2024  I certify that the therapy services are furnished while this patient is under my care.  The services outlined above are required by this patient, and will be reviewed every 90 days.     PHYSICIAN: Flor Lindo, APRN      DATE:     Please sign and return via fax to [phone].. Thank you, [facility].

## 2024-10-24 DIAGNOSIS — G47.33 OBSTRUCTIVE SLEEP APNEA, ADULT: ICD-10-CM

## 2024-10-24 DIAGNOSIS — G47.33 OSA (OBSTRUCTIVE SLEEP APNEA): Primary | ICD-10-CM

## 2024-10-29 ENCOUNTER — TREATMENT (OUTPATIENT)
Dept: PHYSICAL THERAPY | Facility: CLINIC | Age: 34
End: 2024-10-29
Payer: OTHER GOVERNMENT

## 2024-10-29 DIAGNOSIS — M76.61 ACHILLES TENDINITIS OF RIGHT LOWER EXTREMITY: Primary | ICD-10-CM

## 2024-10-29 NOTE — PROGRESS NOTES
Physical Therapy Daily Treatment Note      Visit #: 2    Colin Slater reports that the HEP has been helping with a little less pain in the L achilles and ankle. Pt states that he has been avoiding running which he thinks has also helped the ankle and foot.         Objective Pt present to PT today with no distress at rest.     Pt responded very well to ankle mobilization and calf scraping today.       See Exercise, Manual, and Modality Logs for complete treatment.     Assessment/Plan  Pt stated following treatment today that his foot and ankle felt better than it had in over a year. Pt responded very well to treatment today. Very little exercise was performed today after scraping to avoid the ankle and calf tightening up. Pt will continue with HEP and follow up next week.       Progress per Plan of Care      Visit Diagnosis:    ICD-10-CM ICD-9-CM   1. Achilles tendinitis of right lower extremity  M76.61 726.71              Timed Treatment:  Manual Therapy:    25     mins  26025;  Therapeutic Exercise:    5     mins  54600;     Neuromuscular Kathya:        mins  16112;    Therapeutic Activity:          mins  77250;     Gait Training:           mins  07606;     Ultrasound:          mins  33480;    Electrical Stimulation:         mins  24280 ( );  Dry Needling          mins self-pay  Iontophoresis          mins 78099    Untimed Treatments:  Electrical Stimulation:         mins  26463 ( );  Dry Needling:                     mins  Ultrasound:                         mins  33972;        Timed Treatment:   30   mins   Total Treatment:     30   mins    Jose James, PT  Physical Therapist

## 2024-11-05 ENCOUNTER — TREATMENT (OUTPATIENT)
Dept: PHYSICAL THERAPY | Facility: CLINIC | Age: 34
End: 2024-11-05
Payer: OTHER GOVERNMENT

## 2024-11-05 DIAGNOSIS — M76.61 ACHILLES TENDINITIS OF RIGHT LOWER EXTREMITY: Primary | ICD-10-CM

## 2024-11-05 NOTE — PROGRESS NOTES
Physical Therapy Daily Treatment Note      Visit #: 3    Colin Slater reports 0/10 pain today at rest.  Pt reports that he was able to not participate in the running done over the weekend. Pt states that he would have had a lot of pain if he was required to do any running. However, pt states that he has been pulling a lot of double shifts and walking a lot on his R ankle which has caused more pain and tightness in the R ankle and achilles. Pt states that after last session, he had nearly 24 hours of relief.         Objective Pt present to PT today with no distress at rest.     Pt did well with strengthening activities for the R ankle and foot.     Pt with relief of tightness and pressure following mobilization of the ankle and scraping of the R calf and achilles.       See Exercise, Manual, and Modality Logs for complete treatment.     Assessment/Plan  Pt continues to do well with activities in the clinic and was shown HEP activities to add to program. Pt to continue with PT next week as tolerated to help improve R ankle pain, mobility, and activity tolerance.       Progress per Plan of Care      Visit Diagnosis:    ICD-10-CM ICD-9-CM   1. Achilles tendinitis of right lower extremity  M76.61 726.71              Timed Treatment:  Manual Therapy:    26     mins  28457;  Therapeutic Exercise:    20     mins  14933;     Neuromuscular Kathya:    10    mins  46337;    Therapeutic Activity:          mins  45210;     Gait Training:           mins  53415;     Ultrasound:          mins  70280;    Electrical Stimulation:         mins  02519 ( );  Dry Needling          mins self-pay  Iontophoresis          mins 63656    Untimed Treatments:  Electrical Stimulation:         mins  76726 ( );  Dry Needling:                     mins  Ultrasound:                         mins  76347;        Timed Treatment:   56   mins   Total Treatment:     56   mins    Jose James, PT  Physical Therapist

## 2024-11-12 ENCOUNTER — TREATMENT (OUTPATIENT)
Dept: PHYSICAL THERAPY | Facility: CLINIC | Age: 34
End: 2024-11-12
Payer: OTHER GOVERNMENT

## 2024-11-12 DIAGNOSIS — M76.61 ACHILLES TENDINITIS OF RIGHT LOWER EXTREMITY: Primary | ICD-10-CM

## 2024-11-12 NOTE — PROGRESS NOTES
Physical Therapy Daily Treatment Note      Visit #: 3    Colin Slater reports that he continues to work long hours and is on his feet a lot. Pt reports that he got some relief following last session although did not last as long.         Objective Pt present to PT today with no distress at rest.     Pt with relief with scraping and mobilization of the R ankle.     Pt felt decreased control in the R calf with eccentric lengthening under resistance.     Pt with no increased pain with activities in the clinic today.       See Exercise, Manual, and Modality Logs for complete treatment.     Assessment/Plan  Pt continues to have tightness and irritation in the R achilles. Pt to continue with PT to help improve pain, function, and activity tolerance.       Progress per Plan of Care      Visit Diagnosis:    ICD-10-CM ICD-9-CM   1. Achilles tendinitis of right lower extremity  M76.61 726.71              Timed Treatment:  Manual Therapy:    30     mins  23368;  Therapeutic Exercise:    15     mins  01620;     Neuromuscular Kathya:    10    mins  88638;    Therapeutic Activity:          mins  75081;     Gait Training:           mins  54489;     Ultrasound:          mins  12481;    Electrical Stimulation:         mins  96978 ( );  Dry Needling          mins self-pay  Iontophoresis          mins 85145    Untimed Treatments:  Electrical Stimulation:         mins  92425 (MC );  Dry Needling:                     mins  Ultrasound:                         mins  91627;        Timed Treatment:   55   mins   Total Treatment:     55   mins    Jose James, PT  Physical Therapist

## 2024-11-19 ENCOUNTER — TREATMENT (OUTPATIENT)
Dept: PHYSICAL THERAPY | Facility: CLINIC | Age: 34
End: 2024-11-19
Payer: OTHER GOVERNMENT

## 2024-11-19 DIAGNOSIS — M76.61 ACHILLES TENDINITIS OF RIGHT LOWER EXTREMITY: Primary | ICD-10-CM

## 2024-11-27 NOTE — PROGRESS NOTES
Physical Therapy Daily Treatment Note      Visit #: 4    Colin Slater reports that he had a little relief after last session although thinks we did too much of the eccentric lowering on the R ankle. Pt states that he was very flared up after a brief time of relief.         Objective Pt present to PT today with no distress at rest.     Pt with no increased pain in the R ankle and foot with activities today.     Pt stopped ecc lowering today when he felt his control was decreasing.       See Exercise, Manual, and Modality Logs for complete treatment.     Assessment/Plan  Pt continues to do well with activities in the clinic and gets relief from mobilization and scraping. Pt to continue with PT at next available appointment.       Progress per Plan of Care      Visit Diagnosis:    ICD-10-CM ICD-9-CM   1. Achilles tendinitis of right lower extremity  M76.61 726.71              Timed Treatment:  Manual Therapy:    30     mins  32686;  Therapeutic Exercise:         mins  19093;     Neuromuscular Kathya:    12    mins  56749;    Therapeutic Activity:          mins  58605;     Gait Training:           mins  02017;     Ultrasound:          mins  95514;    Electrical Stimulation:         mins  51242 ( );  Dry Needling          mins self-pay  Iontophoresis          mins 71478    Untimed Treatments:  Electrical Stimulation:         mins  26710 (MC );  Dry Needling:                     mins  Ultrasound:                         mins  34602;        Timed Treatment:   42   mins   Total Treatment:     55   mins    Jose James, PT  Physical Therapist

## 2024-12-02 DIAGNOSIS — G47.33 OSA (OBSTRUCTIVE SLEEP APNEA): Primary | ICD-10-CM

## 2024-12-02 DIAGNOSIS — G47.33 OBSTRUCTIVE SLEEP APNEA, ADULT: ICD-10-CM

## 2024-12-03 ENCOUNTER — TREATMENT (OUTPATIENT)
Dept: PHYSICAL THERAPY | Facility: CLINIC | Age: 34
End: 2024-12-03
Payer: OTHER GOVERNMENT

## 2024-12-03 DIAGNOSIS — M76.61 ACHILLES TENDINITIS OF RIGHT LOWER EXTREMITY: Primary | ICD-10-CM

## 2024-12-05 NOTE — PROGRESS NOTES
Physical Therapy Daily Treatment Note      Visit #: 5    Colin Slater reports that he was progressing his ecc heel raises at home and tried to increase his sets and reps each day until he felt a pull and some pain in the top of his calf. Pt denies any bruising and states that after a few days off the calf has been feeling better. Pt states that he has gotten back to normal activities the last day or two.         Objective Pt present to PT today with no distress at rest.     Pt with no notable bruising in the R calf and no notable hypertonicity in the calf.     Pt with tenderness at the top of the R medial gastroc head with palpation.     Pt with improved mobility and pain with WB function following exercises, US, and scraping of the R calf and achilles.       See Exercise, Manual, and Modality Logs for complete treatment.     Assessment/Plan  Pt continues to have achilles and ankle pain with WB activities. Pt to reduce activities at home to avoid overworking the R calf and ankle. Pt to follow up next week.       Progress per Plan of Care      Visit Diagnosis:    ICD-10-CM ICD-9-CM   1. Achilles tendinitis of right lower extremity  M76.61 726.71              Timed Treatment:  Manual Therapy:    20     mins  26768;  Therapeutic Exercise:         mins  22931;     Neuromuscular Kathya:    13    mins  04693;    Therapeutic Activity:          mins  48907;     Gait Training:           mins  42270;     Ultrasound:     15     mins  68694;    Electrical Stimulation:         mins  80407 ( );  Dry Needling          mins self-pay  Iontophoresis          mins 67749    Untimed Treatments:  Electrical Stimulation:         mins  55443 ( );  Dry Needling:                     mins  Ultrasound:                         mins  12961;        Timed Treatment:   48   mins   Total Treatment:     50   mins    Jose James, PT  Physical Therapist

## 2024-12-17 ENCOUNTER — TREATMENT (OUTPATIENT)
Dept: PHYSICAL THERAPY | Facility: CLINIC | Age: 34
End: 2024-12-17
Payer: OTHER GOVERNMENT

## 2024-12-17 DIAGNOSIS — M76.61 ACHILLES TENDINITIS OF RIGHT LOWER EXTREMITY: Primary | ICD-10-CM

## 2024-12-19 NOTE — PROGRESS NOTES
Physical Therapy Daily Treatment Note      Visit #: 6    Colin Slater reports that his ankle and foot feel about the same but the back of the ankle looks a little more swollen today. Pt reports that he is having better control in the R ankle and calf from what he can tell.         Objective Pt present to PT today with no distress at rest.     Pt with some discomfort in the R proximal gastroc today with ecc TG SL Squats.     Pt with no increased pain following activities today with improved mobility noted by pt following session.       See Exercise, Manual, and Modality Logs for complete treatment.     Assessment/Plan  Pt continues to do well with activities in the clinic and will continue to work on mobility, function, and stability to improve pain free activity tolerance of the R ankle and foot.       Progress per Plan of Care      Visit Diagnosis:    ICD-10-CM ICD-9-CM   1. Achilles tendinitis of right lower extremity  M76.61 726.71              Timed Treatment:  Manual Therapy:    33     mins  07523;  Therapeutic Exercise:         mins  76157;     Neuromuscular Kathya:    10    mins  73306;    Therapeutic Activity:          mins  33007;     Gait Training:           mins  88872;     Ultrasound:          mins  53567;    Electrical Stimulation:         mins  18743 ( );  Dry Needling          mins self-pay  Iontophoresis          mins 80538    Untimed Treatments:  Electrical Stimulation:         mins  06447 (MC );  Dry Needling:                     mins  Ultrasound:                         mins  42406;        Timed Treatment:   43   mins   Total Treatment:     60   mins    Jose James, PT  Physical Therapist

## 2024-12-23 ENCOUNTER — TREATMENT (OUTPATIENT)
Dept: PHYSICAL THERAPY | Facility: CLINIC | Age: 34
End: 2024-12-23
Payer: OTHER GOVERNMENT

## 2024-12-23 DIAGNOSIS — M76.61 ACHILLES TENDINITIS OF RIGHT LOWER EXTREMITY: Primary | ICD-10-CM

## 2024-12-23 PROCEDURE — 97112 NEUROMUSCULAR REEDUCATION: CPT | Performed by: PHYSICAL THERAPIST

## 2024-12-23 PROCEDURE — 97140 MANUAL THERAPY 1/> REGIONS: CPT | Performed by: PHYSICAL THERAPIST

## 2024-12-23 NOTE — PROGRESS NOTES
Physical Therapy Daily Treatment Note      Visit #: 7    Colin Slater reports 6/10 pain today at rest.  Pt reports that he is having pain at the direct insertion of the R achilles at the foot. Pt reports that his calf soreness and pain have resolved. Pt reports that his achilles is very irritated today.         Objective Pt present to PT today with no distress at rest.     Pt with relief following activities in the clinic today.       See Exercise, Manual, and Modality Logs for complete treatment.     Assessment/Plan  Pt continues to get relief from activities performed in the clinic although his pain continues to return through the week. Pt to with PT next week as tolerated.       Progress per Plan of Care      Visit Diagnosis:    ICD-10-CM ICD-9-CM   1. Achilles tendinitis of right lower extremity  M76.61 726.71              Timed Treatment:  Manual Therapy:    30     mins  80232;  Therapeutic Exercise:    3     mins  97907;     Neuromuscular Kathya:   11     mins  90438;    Therapeutic Activity:          mins  77393;     Gait Training:           mins  27442;     Ultrasound:          mins  21649;    Electrical Stimulation:         mins  15773 (MC );  Dry Needling          mins self-pay  Iontophoresis          mins 18431    Untimed Treatments:  Electrical Stimulation:         mins  14351 (MC );  Dry Needling:                     mins  Ultrasound:                         mins  00870;        Timed Treatment:   44   mins   Total Treatment:     44   mins    Jose James, PT  Physical Therapist

## 2024-12-27 ENCOUNTER — APPOINTMENT (OUTPATIENT)
Facility: HOSPITAL | Age: 34
End: 2024-12-27
Payer: OTHER GOVERNMENT

## 2024-12-27 ENCOUNTER — HOSPITAL ENCOUNTER (EMERGENCY)
Facility: HOSPITAL | Age: 34
Discharge: HOME | End: 2024-12-27
Attending: EMERGENCY MEDICINE
Payer: OTHER GOVERNMENT

## 2024-12-27 VITALS
SYSTOLIC BLOOD PRESSURE: 152 MMHG | TEMPERATURE: 98.1 F | OXYGEN SATURATION: 97 % | HEART RATE: 78 BPM | RESPIRATION RATE: 18 BRPM | HEIGHT: 66 IN | DIASTOLIC BLOOD PRESSURE: 93 MMHG | BODY MASS INDEX: 30.86 KG/M2 | WEIGHT: 192 LBS

## 2024-12-27 DIAGNOSIS — R42 DIZZINESS: Primary | ICD-10-CM

## 2024-12-27 LAB
ALBUMIN SERPL-MCNC: 4.9 G/DL (ref 3.5–5.2)
ALBUMIN/GLOB SERPL: 1.6 G/DL
ALP SERPL-CCNC: 111 U/L (ref 39–117)
ALT SERPL W P-5'-P-CCNC: 38 U/L (ref 1–41)
AMPHET+METHAMPHET UR QL: POSITIVE
AMPHETAMINES UR QL: NEGATIVE
ANION GAP SERPL CALCULATED.3IONS-SCNC: 11.3 MMOL/L (ref 5–15)
AST SERPL-CCNC: 29 U/L (ref 1–40)
BARBITURATES UR QL SCN: NEGATIVE
BASOPHILS # BLD AUTO: 0.01 10*3/MM3 (ref 0–0.2)
BASOPHILS NFR BLD AUTO: 0.2 % (ref 0–1.5)
BENZODIAZ UR QL SCN: NEGATIVE
BILIRUB SERPL-MCNC: 0.4 MG/DL (ref 0–1.2)
BILIRUB UR QL STRIP: NEGATIVE
BUN SERPL-MCNC: 17 MG/DL (ref 6–20)
BUN/CREAT SERPL: 16.3 (ref 7–25)
BUPRENORPHINE SERPL-MCNC: NEGATIVE NG/ML
CALCIUM SPEC-SCNC: 9.3 MG/DL (ref 8.6–10.5)
CANNABINOIDS SERPL QL: NEGATIVE
CHLORIDE SERPL-SCNC: 104 MMOL/L (ref 98–107)
CLARITY UR: CLEAR
CO2 SERPL-SCNC: 23.7 MMOL/L (ref 22–29)
COCAINE UR QL: NEGATIVE
COLOR UR: YELLOW
CREAT SERPL-MCNC: 1.04 MG/DL (ref 0.76–1.27)
DEPRECATED RDW RBC AUTO: 36.9 FL (ref 37–54)
EGFRCR SERPLBLD CKD-EPI 2021: 97.8 ML/MIN/1.73
EOSINOPHIL # BLD AUTO: 0.14 10*3/MM3 (ref 0–0.4)
EOSINOPHIL NFR BLD AUTO: 2.3 % (ref 0.3–6.2)
ERYTHROCYTE [DISTWIDTH] IN BLOOD BY AUTOMATED COUNT: 11.7 % (ref 12.3–15.4)
ETHANOL BLD-MCNC: <10 MG/DL (ref 0–10)
FENTANYL UR-MCNC: NEGATIVE NG/ML
GLOBULIN UR ELPH-MCNC: 3 GM/DL
GLUCOSE BLDC GLUCOMTR-MCNC: 96 MG/DL (ref 70–130)
GLUCOSE SERPL-MCNC: 98 MG/DL (ref 65–99)
GLUCOSE UR STRIP-MCNC: NEGATIVE MG/DL
HCT VFR BLD AUTO: 49.1 % (ref 37.5–51)
HGB BLD-MCNC: 17.4 G/DL (ref 13–17.7)
HGB UR QL STRIP.AUTO: NEGATIVE
HOLD SPECIMEN: NORMAL
IMM GRANULOCYTES # BLD AUTO: 0.01 10*3/MM3 (ref 0–0.05)
IMM GRANULOCYTES NFR BLD AUTO: 0.2 % (ref 0–0.5)
KETONES UR QL STRIP: NEGATIVE
LEUKOCYTE ESTERASE UR QL STRIP.AUTO: NEGATIVE
LYMPHOCYTES # BLD AUTO: 2.01 10*3/MM3 (ref 0.7–3.1)
LYMPHOCYTES NFR BLD AUTO: 33.5 % (ref 19.6–45.3)
MCH RBC QN AUTO: 30.1 PG (ref 26.6–33)
MCHC RBC AUTO-ENTMCNC: 35.4 G/DL (ref 31.5–35.7)
MCV RBC AUTO: 84.9 FL (ref 79–97)
METHADONE UR QL SCN: NEGATIVE
MONOCYTES # BLD AUTO: 0.68 10*3/MM3 (ref 0.1–0.9)
MONOCYTES NFR BLD AUTO: 11.3 % (ref 5–12)
NEUTROPHILS NFR BLD AUTO: 3.15 10*3/MM3 (ref 1.7–7)
NEUTROPHILS NFR BLD AUTO: 52.5 % (ref 42.7–76)
NITRITE UR QL STRIP: NEGATIVE
OPIATES UR QL: NEGATIVE
OXYCODONE UR QL SCN: NEGATIVE
PCP UR QL SCN: NEGATIVE
PH UR STRIP.AUTO: 6 [PH] (ref 5–8)
PLATELET # BLD AUTO: 242 10*3/MM3 (ref 140–450)
PMV BLD AUTO: 10 FL (ref 6–12)
POTASSIUM SERPL-SCNC: 4.1 MMOL/L (ref 3.5–5.2)
PROT SERPL-MCNC: 7.9 G/DL (ref 6–8.5)
PROT UR QL STRIP: NEGATIVE
QT INTERVAL: 368 MS
QTC INTERVAL: 432 MS
RBC # BLD AUTO: 5.78 10*6/MM3 (ref 4.14–5.8)
SODIUM SERPL-SCNC: 139 MMOL/L (ref 136–145)
SP GR UR STRIP: 1.02 (ref 1–1.03)
TRICYCLICS UR QL SCN: NEGATIVE
TROPONIN T SERPL HS-MCNC: <6 NG/L
UROBILINOGEN UR QL STRIP: NORMAL
WBC NRBC COR # BLD AUTO: 6 10*3/MM3 (ref 3.4–10.8)
WHOLE BLOOD HOLD COAG: NORMAL
WHOLE BLOOD HOLD SPECIMEN: NORMAL

## 2024-12-27 PROCEDURE — 84484 ASSAY OF TROPONIN QUANT: CPT | Performed by: EMERGENCY MEDICINE

## 2024-12-27 PROCEDURE — 71045 X-RAY EXAM CHEST 1 VIEW: CPT

## 2024-12-27 PROCEDURE — 70450 CT HEAD/BRAIN W/O DYE: CPT

## 2024-12-27 PROCEDURE — 99284 EMERGENCY DEPT VISIT MOD MDM: CPT

## 2024-12-27 PROCEDURE — 82948 REAGENT STRIP/BLOOD GLUCOSE: CPT

## 2024-12-27 PROCEDURE — 80307 DRUG TEST PRSMV CHEM ANLYZR: CPT | Performed by: EMERGENCY MEDICINE

## 2024-12-27 PROCEDURE — 87086 URINE CULTURE/COLONY COUNT: CPT | Performed by: EMERGENCY MEDICINE

## 2024-12-27 PROCEDURE — 81003 URINALYSIS AUTO W/O SCOPE: CPT | Performed by: EMERGENCY MEDICINE

## 2024-12-27 PROCEDURE — 85025 COMPLETE CBC W/AUTO DIFF WBC: CPT | Performed by: EMERGENCY MEDICINE

## 2024-12-27 PROCEDURE — 93005 ELECTROCARDIOGRAM TRACING: CPT | Performed by: EMERGENCY MEDICINE

## 2024-12-27 PROCEDURE — 82077 ASSAY SPEC XCP UR&BREATH IA: CPT | Performed by: EMERGENCY MEDICINE

## 2024-12-27 PROCEDURE — 80053 COMPREHEN METABOLIC PANEL: CPT | Performed by: EMERGENCY MEDICINE

## 2024-12-27 RX ORDER — SODIUM CHLORIDE 0.9 % (FLUSH) 0.9 %
10 SYRINGE (ML) INJECTION AS NEEDED
Status: DISCONTINUED | OUTPATIENT
Start: 2024-12-27 | End: 2024-12-27 | Stop reason: HOSPADM

## 2024-12-27 NOTE — FSED PROVIDER NOTE
"Subjective   History of Present Illness  Patient presents to the emergency department for anxiety and confusion.  Says that he was driving around 730 this morning began to feel very nauseous and felt lightheaded.  Says he did not vomit.  Denies any chest pain or palpitations.  Now says he feels very jumpy knows that no one wants to hurt him but is incredibly anxious.  No identifiable alleviating or exacerbating factors    History provided by:  Patient   used: No        Review of Systems   Neurological:  Positive for dizziness.   Psychiatric/Behavioral:  The patient is nervous/anxious.    All other systems reviewed and are negative.      Past Medical History:   Diagnosis Date    Acute appendicitis 8/24/2020    ADD (attention deficit disorder)     ADHD (attention deficit hyperactivity disorder)     Asthma     Depression     Moderate single current episode of major depressive disorder 4/24/2017       Allergies   Allergen Reactions    Hydrocodone Hallucinations     Patient states that he had \"mild hallucinations\" with this medication    Apple Juice Other (See Comments)     Diarrhea/constipation    Banana Other (See Comments)     Diarrhea/constipation       Past Surgical History:   Procedure Laterality Date    APPENDECTOMY N/A 08/24/2020    Procedure: APPENDECTOMY LAPAROSCOPIC;  Surgeon: Indy Mcdaniel MD;  Location: [facility];  Service: General;  Laterality: N/A;    EAR TUBES      MYRINGOTOMY W/ TUBES      NASAL SEPTUM SURGERY      NOSE SURGERY  2023    SEPTOPLASTY      WISDOM TOOTH EXTRACTION      WISDOM TOOTH EXTRACTION         Family History   Problem Relation Age of Onset    Diabetes Mother     Diabetes Father     Heart disease Father     Hyperlipidemia Father     Hypertension Father     Thyroid disease Sister     Early death Maternal Grandfather     Lung cancer Paternal Grandmother     Cancer Paternal Grandmother     Heart disease Paternal Grandmother     Hyperlipidemia Paternal Grandmother  "    No Known Problems Paternal Grandfather        Social History     Socioeconomic History    Marital status: Single   Tobacco Use    Smoking status: Some Days     Types: Cigars     Passive exposure: Current    Smokeless tobacco: Never    Tobacco comments:     very rarely   Vaping Use    Vaping status: Never Used   Substance and Sexual Activity    Alcohol use: Yes     Alcohol/week: 12.0 standard drinks of alcohol     Types: 6 Cans of beer, 6 Shots of liquor per week     Comment: weekly    Drug use: No    Sexual activity: Yes     Partners: Female           Objective   Physical Exam  Vitals and nursing note reviewed.   Constitutional:       Appearance: Normal appearance. He is not toxic-appearing or diaphoretic.   HENT:      Head: Normocephalic and atraumatic.      Nose: Nose normal.      Mouth/Throat:      Mouth: Mucous membranes are moist.      Pharynx: Oropharynx is clear.   Eyes:      Extraocular Movements: Extraocular movements intact.      Pupils: Pupils are equal, round, and reactive to light.   Cardiovascular:      Rate and Rhythm: Normal rate and regular rhythm.      Pulses: Normal pulses.           Radial pulses are 2+ on the right side and 2+ on the left side.      Heart sounds: Normal heart sounds.   Pulmonary:      Effort: Pulmonary effort is normal. No respiratory distress.      Breath sounds: Normal breath sounds. No stridor. No wheezing, rhonchi or rales.   Chest:      Chest wall: No tenderness.   Abdominal:      General: Abdomen is flat. There is no distension.      Tenderness: There is no abdominal tenderness. There is no guarding or rebound.   Musculoskeletal:         General: No swelling, tenderness, deformity or signs of injury. Normal range of motion.      Cervical back: Normal range of motion and neck supple. No rigidity.      Right lower leg: No edema.      Left lower leg: No edema.   Lymphadenopathy:      Cervical: No cervical adenopathy.   Skin:     General: Skin is warm and dry.       Capillary Refill: Capillary refill takes less than 2 seconds.      Coloration: Skin is not jaundiced.      Findings: No bruising.   Neurological:      General: No focal deficit present.      Mental Status: He is alert and oriented to person, place, and time.      Cranial Nerves: No cranial nerve deficit.      Sensory: No sensory deficit.      Motor: No weakness.   Psychiatric:         Attention and Perception: Attention normal. He does not perceive auditory or visual hallucinations.         Mood and Affect: Mood is anxious.         Speech: Speech normal.         Behavior: Behavior is agitated. Behavior is not aggressive, withdrawn or combative. Behavior is cooperative.         Thought Content: Thought content normal.         Cognition and Memory: Cognition and memory normal.         Judgment: Judgment normal.         ECG 12 Lead      Date/Time: 12/27/2024 9:10 AM    Performed by: Lyndon Le MD  Authorized by: Lyndon Le MD  Interpreted by ED physician  Rhythm: sinus rhythm  Rate: normal  BPM: 83  QRS axis: normal  Conduction: conduction normal  ST Segments: ST segments normal  T Waves: T waves normal  Other: no other findings  Clinical impression: normal ECG               ED Course                                           Medical Decision Making  Hemodynamically stable and afebrile.  Patient is neurovascular intact.  CT scan of the head is negative.  EKG is normal initial troponin is negative.  Repeat troponin was hemolyzed.  Patient declines to have a second 1 drawn.  Says that he feels otherwise well and does not want to stay.  Understands the risk.  Discharge plan given return precautions    Problems Addressed:  Dizziness: complicated acute illness or injury    Amount and/or Complexity of Data Reviewed  Labs: ordered. Decision-making details documented in ED Course.  Radiology: ordered. Decision-making details documented in ED Course.  ECG/medicine tests: ordered and independent interpretation  performed. Decision-making details documented in ED Course.    Risk  Prescription drug management.        Final diagnoses:   Dizziness       ED Disposition  ED Disposition       ED Disposition   Discharge    Condition   Stable    Comment   --               Flor Lindo, APRN  [address] [address] [phone]    Schedule an appointment as soon as possible for a visit   As needed    [facility] EMERGENCY DEPARTMENT [place]  [address]  [facility] [address] [phone]  Go to   As needed         Medication List      No changes were made to your prescriptions during this visit.

## 2024-12-28 LAB — BACTERIA SPEC AEROBE CULT: NO GROWTH

## 2025-01-07 ENCOUNTER — TREATMENT (OUTPATIENT)
Dept: PHYSICAL THERAPY | Facility: CLINIC | Age: 35
End: 2025-01-07
Payer: OTHER GOVERNMENT

## 2025-01-07 DIAGNOSIS — M76.61 ACHILLES TENDINITIS OF RIGHT LOWER EXTREMITY: Primary | ICD-10-CM

## 2025-01-07 PROCEDURE — 97112 NEUROMUSCULAR REEDUCATION: CPT | Performed by: PHYSICAL THERAPIST

## 2025-01-07 PROCEDURE — 97110 THERAPEUTIC EXERCISES: CPT | Performed by: PHYSICAL THERAPIST

## 2025-01-07 PROCEDURE — 97140 MANUAL THERAPY 1/> REGIONS: CPT | Performed by: PHYSICAL THERAPIST

## 2025-01-10 NOTE — PROGRESS NOTES
Physical Therapy Daily Treatment Note      Visit #: 8    Colin Slater reports that his achilles and ankle continue to feel better after PT sessions although the pain returns. Pt states that he has figured out how to perform some distraction on the R ankle that relieves a lot of pain that allows him to sleep better.         Objective Pt present to PT today with no distress at rest.     Pt did well with activities in the clinic. Pt with some pain in the R calf with full stretching of the R calf while performing ecc lowering on the R ankle. Pt instructed to avoid full ROM to avoid pain.       See Exercise, Manual, and Modality Logs for complete treatment.     Assessment/Plan  Pt continues to respond well to activities to improve mobility of the R ankle and will progress stabilization as tolerated. Pt to continue with PT next session as tolerated. Pt is having neck pain and may try to get a referral for that.       Progress per Plan of Care      Visit Diagnosis:    ICD-10-CM ICD-9-CM   1. Achilles tendinitis of right lower extremity  M76.61 726.71              Timed Treatment:  Manual Therapy:    30     mins  06648;  Therapeutic Exercise:    20     mins  59969;     Neuromuscular Kathya:    10    mins  08799;    Therapeutic Activity:          mins  48588;     Gait Training:           mins  66060;     Ultrasound:          mins  01770;    Electrical Stimulation:         mins  39525 ( );  Dry Needling          mins self-pay  Iontophoresis          mins 90520    Untimed Treatments:  Electrical Stimulation:         mins  59904 ( );  Dry Needling:                     mins  Ultrasound:                         mins  05891;        Timed Treatment:   60   mins   Total Treatment:     60   mins    Jose James, PT  Physical Therapist

## 2025-01-21 ENCOUNTER — TREATMENT (OUTPATIENT)
Dept: PHYSICAL THERAPY | Facility: CLINIC | Age: 35
End: 2025-01-21
Payer: OTHER GOVERNMENT

## 2025-01-21 DIAGNOSIS — M76.61 ACHILLES TENDINITIS OF RIGHT LOWER EXTREMITY: Primary | ICD-10-CM

## 2025-01-21 PROCEDURE — 97140 MANUAL THERAPY 1/> REGIONS: CPT | Performed by: PHYSICAL THERAPIST

## 2025-01-21 PROCEDURE — 97110 THERAPEUTIC EXERCISES: CPT | Performed by: PHYSICAL THERAPIST

## 2025-01-23 NOTE — PROGRESS NOTES
Physical Therapy Daily Treatment Note      Visit #: 9    Colin Slater reports 0/10 pain today at rest.  Pt reports that he did a lot of testing on the achilles the last week. Pt reports that yesterday he ran sprints and did the stair climber for 115 floors. Pt was pleasantly surprised with lack of pain in the R achilles although he did have some throbbing and burning in the achilles at night that was bothersome.         Objective Pt present to PT today with no distress at rest.     PT held a lot of workout activities to rest the calf and achilles after pt did so much yesterday.     Pt did well with light mobility work and inversion work.       See Exercise, Manual, and Modality Logs for complete treatment.     Assessment/Plan  Pt seems to be responding well to activities in the clinic to improve achilles pain and activity tolerance for heavy workout and work activity. Pt to continue with PT to help improve R achilles strength, motor control, and pain free function.       Progress per Plan of Care      Visit Diagnosis:    ICD-10-CM ICD-9-CM   1. Achilles tendinitis of right lower extremity  M76.61 726.71              Timed Treatment:  Manual Therapy:    40     mins  65453;  Therapeutic Exercise:    10     mins  66987;     Neuromuscular Kathya:        mins  39433;    Therapeutic Activity:          mins  76256;     Gait Training:           mins  82761;     Ultrasound:          mins  19700;    Electrical Stimulation:         mins  96681 ( );  Dry Needling          mins self-pay  Iontophoresis          mins 64948    Untimed Treatments:  Electrical Stimulation:         mins  94859 (MC );  Dry Needling:                     mins  Ultrasound:                         mins  62178;        Timed Treatment:   40   mins   Total Treatment:     40   mins    Jose James, PT  Physical Therapist

## 2025-01-28 ENCOUNTER — TREATMENT (OUTPATIENT)
Dept: PHYSICAL THERAPY | Facility: CLINIC | Age: 35
End: 2025-01-28
Payer: OTHER GOVERNMENT

## 2025-01-28 DIAGNOSIS — M76.61 ACHILLES TENDINITIS OF RIGHT LOWER EXTREMITY: Primary | ICD-10-CM

## 2025-01-28 PROCEDURE — 97140 MANUAL THERAPY 1/> REGIONS: CPT | Performed by: PHYSICAL THERAPIST

## 2025-01-28 PROCEDURE — 97110 THERAPEUTIC EXERCISES: CPT | Performed by: PHYSICAL THERAPIST

## 2025-01-28 PROCEDURE — 97035 APP MDLTY 1+ULTRASOUND EA 15: CPT | Performed by: PHYSICAL THERAPIST

## 2025-01-30 NOTE — PROGRESS NOTES
Physical Therapy Daily Treatment Note      Visit #: 10    Colin Slater reports that everything is hurting today. Pt states that he got new shoes that he ran in and they seemed to help reduce the stress on the achilles although have made his knees hurt. Pt states that he took about 5 days off from doing anything then had  PT testing and a long day at work so he has been wiped and sore since then. Pt states that his achilles and calf fell pretty good today although the rest of his body is extremely sore.         Objective Pt present to PT today with no distress at rest.     Pt performed minimal strengthening activities today to avoid undue irritation of the achilles.     Pt responded well to US and mobilization of the ankle and calf.       See Exercise, Manual, and Modality Logs for complete treatment.     Assessment/Plan  Pt continues to have some achilles tightness and soreness. Pt to follow up next week to progress activities as tolerated.       Progress per Plan of Care      Visit Diagnosis:    ICD-10-CM ICD-9-CM   1. Achilles tendinitis of right lower extremity  M76.61 726.71              Timed Treatment:  Manual Therapy:    30     mins  58099;  Therapeutic Exercise:    14     mins  03435;     Neuromuscular Kathya:        mins  19170;    Therapeutic Activity:          mins  23484;     Gait Training:           mins  18765;     Ultrasound:      12    mins  14739;    Electrical Stimulation:         mins  49189 ( );  Dry Needling          mins self-pay  Iontophoresis          mins 87458    Untimed Treatments:  Electrical Stimulation:         mins  54364 ( );  Dry Needling:                     mins  Ultrasound:                         mins  85601;        Timed Treatment:   56   mins   Total Treatment:     56   mins    Jose James, PT  Physical Therapist

## 2025-02-04 ENCOUNTER — TREATMENT (OUTPATIENT)
Dept: PHYSICAL THERAPY | Facility: CLINIC | Age: 35
End: 2025-02-04
Payer: OTHER GOVERNMENT

## 2025-02-04 DIAGNOSIS — M76.61 ACHILLES TENDINITIS OF RIGHT LOWER EXTREMITY: Primary | ICD-10-CM

## 2025-02-04 PROCEDURE — 97140 MANUAL THERAPY 1/> REGIONS: CPT | Performed by: PHYSICAL THERAPIST

## 2025-02-04 PROCEDURE — 97110 THERAPEUTIC EXERCISES: CPT | Performed by: PHYSICAL THERAPIST

## 2025-02-06 NOTE — PROGRESS NOTES
Physical Therapy Daily Treatment Note      Visit #: 11    Colin Slater reports that he has been having more pain and symptoms in the medial ankle and arch of the R foot this week. Pt states that the achilles is feeling better than usual.         Objective Pt present to PT today with no distress at rest.     Pt with tenderness through the posterior tibialis tendon in the R foot and ankle.     Pt with no increased pain or symptoms with standing activities although inversion of the R ankle caused pain. Pain subsided after activity.       See Exercise, Manual, and Modality Logs for complete treatment.     Assessment/Plan  Pt continues to respond well to activities in the clinic to help improve ankle mobility and stability of supporting muscles. Pt to continue with PT to help improve pain with WB activities as tolerated.             Visit Diagnosis:    ICD-10-CM ICD-9-CM   1. Achilles tendinitis of right lower extremity  M76.61 726.71              Timed Treatment:  Manual Therapy:    30     mins  43786;  Therapeutic Exercise:    10     mins  81821;     Neuromuscular Kathya:        mins  71279;    Therapeutic Activity:          mins  77988;     Gait Training:           mins  28265;     Ultrasound:          mins  99003;    Electrical Stimulation:         mins  70444 ( );  Dry Needling          mins self-pay  Iontophoresis          mins 45730    Untimed Treatments:  Electrical Stimulation:         mins  94889 (MC );  Dry Needling:                     mins  Ultrasound:                         mins  13325;        Timed Treatment:   40   mins   Total Treatment:     55   mins    Jose James, PT  Physical Therapist

## 2025-02-07 ENCOUNTER — OFFICE VISIT (OUTPATIENT)
Dept: INTERNAL MEDICINE | Facility: CLINIC | Age: 35
End: 2025-02-07
Payer: OTHER GOVERNMENT

## 2025-02-07 VITALS
TEMPERATURE: 98.2 F | BODY MASS INDEX: 33.11 KG/M2 | RESPIRATION RATE: 18 BRPM | HEART RATE: 81 BPM | OXYGEN SATURATION: 97 % | DIASTOLIC BLOOD PRESSURE: 76 MMHG | SYSTOLIC BLOOD PRESSURE: 124 MMHG | WEIGHT: 206 LBS | HEIGHT: 66 IN

## 2025-02-07 DIAGNOSIS — M54.2 CHRONIC NECK PAIN: ICD-10-CM

## 2025-02-07 DIAGNOSIS — R42 POSTURAL DIZZINESS WITH PRESYNCOPE: Primary | ICD-10-CM

## 2025-02-07 DIAGNOSIS — M67.971 ACHILLES TENDON DISORDER, RIGHT: ICD-10-CM

## 2025-02-07 DIAGNOSIS — M54.2 NECK PAIN ON RIGHT SIDE: ICD-10-CM

## 2025-02-07 DIAGNOSIS — R55 POSTURAL DIZZINESS WITH PRESYNCOPE: Primary | ICD-10-CM

## 2025-02-07 DIAGNOSIS — L98.9 FACIAL SKIN LESION: ICD-10-CM

## 2025-02-07 DIAGNOSIS — G89.29 CHRONIC NECK PAIN: ICD-10-CM

## 2025-02-07 PROCEDURE — 99214 OFFICE O/P EST MOD 30 MIN: CPT | Performed by: PHYSICIAN ASSISTANT

## 2025-02-07 NOTE — PROGRESS NOTES
Office Visit      Patient Name: Colin Slater  : 1990   MRN: [id number]     Chief Complaint:    Chief Complaint   Patient presents with   • Skin Problem     Needs dermatology referral    • Syncope     Was seen at [facility] ED, needs Cardiology referral    • Neck Pain     Requests PT referral for neck pain        History of Present Illness: Colin Slater is a 34 y.o. male who is here today to discuss syncope, a skin lesion and neck pain.    He was seen at [facility] ED on 2024 due to nausea, lightheadedness, anxiety and confusion.  CT head was negative.  EKG and troponin negative.  Urinalysis with culture negative.  He is not sure if he truly lost consciousness but feels he could have for a few seconds. His chest felt tight and achy when the episode occurred. No further episodes of syncope but he has been extremely tired to the point of being fearful he will fall asleep unknowingly, which makes him feel as though he is presyncopal. He believes he is sleeping well at night. Utilizing CPAP as he is supposed to.      He has had some increased frequency of trouble with his neck and headaches. He frequently has some tension headache in the occipital area and into the top of the head.  The right side of the neck is bothersome often with soreness and stiffness.  He has been to a chiropractor which seems to help the headaches but not he neck much.     Skin lesion below the left eye for the last 1-2 years. The lesion grows until he picks at it and then it starts over. No pain or itching.     Trouble with the right achilles tendon for a couple of years.  He saw an orthopedic surgeon who recommended he wear a boot at night but he was unable to tolerate it as it caused pain in a different area of his foot.  Physical therapy helps symptoms for about 1 day then pain returns.         Subjective      I have reviewed and the following portions of the patient's history were updated as  "appropriate: past family history, past medical history, past social history, past surgical history and problem list.      Current Outpatient Medications:   •  fexofenadine (Allegra Allergy) 60 MG tablet, Take 1 tablet by mouth Daily., Disp: 90 tablet, Rfl: 3  •  Vyvanse 20 MG capsule, , Disp: , Rfl:   •  fluticasone (FLONASE) 50 MCG/ACT nasal spray, 2 sprays into the nostril(s) as directed by provider Daily., Disp: 16 g, Rfl: 5  •  Scopolamine 1 MG/3DAYS patch, Place 1 patch on the skin as directed by provider Every 72 (Seventy-Two) Hours., Disp: 4 patch, Rfl: 0    Allergies   Allergen Reactions   • Hydrocodone Hallucinations     Patient states that he had \"mild hallucinations\" with this medication   • Apple Juice Other (See Comments)     Diarrhea/constipation   • Banana Other (See Comments)     Diarrhea/constipation       Objective     Vital Signs:   Vitals:    02/07/25 1658   BP: 124/76   Pulse: 81   Resp: 18   Temp: 98.2 °F (36.8 °C)   SpO2: 97%   Weight: 93.4 kg (206 lb)   Height: 167.6 cm (66\")     Body mass index is 33.25 kg/m².    Physical Exam  Vitals and nursing note reviewed.   Constitutional:       General: He is not in acute distress.     Appearance: He is well-developed. He is not ill-appearing, toxic-appearing or diaphoretic.   HENT:      Head: Normocephalic and atraumatic.      Right Ear: Tympanic membrane, ear canal and external ear normal. There is no impacted cerumen.      Left Ear: Tympanic membrane, ear canal and external ear normal. There is no impacted cerumen.      Mouth/Throat:      Mouth: Mucous membranes are moist.      Pharynx: No posterior oropharyngeal erythema.   Eyes:      General: No scleral icterus.     Extraocular Movements: Extraocular movements intact.      Conjunctiva/sclera: Conjunctivae normal.      Pupils: Pupils are equal, round, and reactive to light.   Cardiovascular:      Rate and Rhythm: Normal rate and regular rhythm.      Heart sounds: Normal heart sounds. No murmur " heard.     No friction rub. No gallop.   Pulmonary:      Effort: Pulmonary effort is normal. No respiratory distress.      Breath sounds: Normal breath sounds. No stridor. No wheezing, rhonchi or rales.   Chest:      Chest wall: No tenderness.   Musculoskeletal:         General: No tenderness or deformity. Normal range of motion.      Cervical back: Normal range of motion and neck supple. Tenderness, bony tenderness and crepitus present. No swelling, deformity, rigidity or spasms. Pain with movement present. Normal range of motion.        Back:       Right lower leg: No edema.      Left lower leg: No edema.      Right ankle:      Right Achilles Tendon: Tenderness and defect (Deformity noted) present.   Lymphadenopathy:      Cervical: No cervical adenopathy.   Skin:     General: Skin is warm and dry.      Capillary Refill: Capillary refill takes less than 2 seconds.      Coloration: Skin is not jaundiced or pale.      Findings: Lesion (single lesion below left eye, crusty) present. No erythema or rash.          Neurological:      General: No focal deficit present.      Mental Status: He is alert and oriented to person, place, and time.      Cranial Nerves: No cranial nerve deficit.      Sensory: No sensory deficit.      Motor: No weakness or abnormal muscle tone.      Coordination: Coordination normal.      Gait: Gait normal.      Deep Tendon Reflexes: Reflexes normal.   Psychiatric:         Mood and Affect: Mood normal.         Behavior: Behavior normal.         Thought Content: Thought content normal.         Judgment: Judgment normal.         Common labs          12/27/2024    09:18   Common Labs   Glucose 98    BUN 17    Creatinine 1.04    Sodium 139    Potassium 4.1    Chloride 104    Calcium 9.3    Albumin 4.9    Total Bilirubin 0.4    Alkaline Phosphatase 111    AST (SGOT) 29    ALT (SGPT) 38    WBC 6.00    Hemoglobin 17.4    Hematocrit 49.1    Platelets 242          Assessment / Plan      Assessment/Plan:    Diagnoses and all orders for this visit:    1. Postural dizziness with presyncope (Primary)  -     Ambulatory Referral to Cardiology    2. Facial skin lesion  -     Ambulatory Referral to Dermatology    3. Neck pain on right side  -     Ambulatory Referral to Physical Therapy for Evaluation & Treatment    4. Chronic neck pain  -     Ambulatory Referral to Physical Therapy for Evaluation & Treatment    5. Achilles tendon disorder, right  -     Ambulatory Referral to Orthopedic Surgery       Orthopedic surgery referral ordered for second opinion regarding Achilles tendon issue.    Physical therapy ordered for chronic neck pain.    Dermatology referral ordered for lesion below left eye.    Cardiology referral ordered due to syncopal episode and subsequent episodes of presyncopal feeling.  Also recommended he schedule a follow-up visit with sleep medicine to ensure he is receiving full benefit of CPAP therapy.    ED record reviewed.    I spent 35 minutes caring for Colin on this date of service. This time includes time spent by me in the following activities:preparing for the visit, reviewing tests, obtaining and/or reviewing a separately obtained history, performing a medically appropriate examination and/or evaluation , counseling and educating the patient/family/caregiver, referring and communicating with other health care professionals , and documenting information in the medical record    Follow Up:   No follow-ups on file.    Patient was given instructions and counseling regarding his condition or for health maintenance advice. Please see specific information pulled into the AVS if appropriate.     Mirna Santo PA-C  [facility]     Please note that portions of this note may have been completed with a voice recognition program.

## 2025-02-18 ENCOUNTER — TREATMENT (OUTPATIENT)
Dept: PHYSICAL THERAPY | Facility: CLINIC | Age: 35
End: 2025-02-18
Payer: OTHER GOVERNMENT

## 2025-02-18 DIAGNOSIS — M76.61 ACHILLES TENDINITIS OF RIGHT LOWER EXTREMITY: Primary | ICD-10-CM

## 2025-02-18 PROCEDURE — 97112 NEUROMUSCULAR REEDUCATION: CPT | Performed by: PHYSICAL THERAPIST

## 2025-02-18 PROCEDURE — 97110 THERAPEUTIC EXERCISES: CPT | Performed by: PHYSICAL THERAPIST

## 2025-02-18 PROCEDURE — 97140 MANUAL THERAPY 1/> REGIONS: CPT | Performed by: PHYSICAL THERAPIST

## 2025-02-18 NOTE — PROGRESS NOTES
Physical Therapy Daily Treatment Note      Visit #: 12    Colin Slater reports that he is hurting all over. Pt reports that he got a referral to  to do PRP injections. Pt reports that he has had improvement in PT although feels like he was behind the curve when he started. Pt reports that he is going to go get some blood work done to check why he feels so bad all the time.         Objective Pt present to PT today with no distress at rest.     Pt with relief with added plantar fascia and posterior tibialis tendon scraping.     Pt with pain with light plymometric loading of the R ankle and achilles.       See Exercise, Manual, and Modality Logs for complete treatment.     Assessment/Plan  Pt continues to have limited function with work and high level exercise due to R ankle and achilles pain. Pt to continue with PT next week and assess reaction to activities this session.       Progress per Plan of Care      Visit Diagnosis:    ICD-10-CM ICD-9-CM   1. Achilles tendinitis of right lower extremity  M76.61 726.71              Timed Treatment:  Manual Therapy:    30     mins  03403;  Therapeutic Exercise:    20     mins  14948;     Neuromuscular Kathya:    10    mins  43644;    Therapeutic Activity:          mins  93903;     Gait Training:           mins  48010;     Ultrasound:          mins  79663;    Electrical Stimulation:         mins  65635 ( );  Dry Needling          mins self-pay  Iontophoresis          mins 89060    Untimed Treatments:  Electrical Stimulation:         mins  52522 ( );  Dry Needling:                     mins  Ultrasound:                         mins  94030;        Timed Treatment:   60   mins   Total Treatment:     60   mins    Jose James, PT  Physical Therapist

## 2025-02-25 ENCOUNTER — TREATMENT (OUTPATIENT)
Dept: PHYSICAL THERAPY | Facility: CLINIC | Age: 35
End: 2025-02-25
Payer: OTHER GOVERNMENT

## 2025-02-25 DIAGNOSIS — M54.2 PAIN, NECK: Primary | ICD-10-CM

## 2025-02-25 PROCEDURE — 97161 PT EVAL LOW COMPLEX 20 MIN: CPT | Performed by: PHYSICAL THERAPIST
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
## 2025-03-04 ENCOUNTER — TELEPHONE (OUTPATIENT)
Dept: PHYSICAL THERAPY | Facility: CLINIC | Age: 35
End: 2025-03-04

## 2025-03-04 NOTE — TELEPHONE ENCOUNTER
Caller: Colin Slater    Relationship: Self       What was the call regarding: SOMETHING CAME UP

## 2025-03-11 ENCOUNTER — TREATMENT (OUTPATIENT)
Dept: PHYSICAL THERAPY | Facility: CLINIC | Age: 35
End: 2025-03-11
Payer: OTHER GOVERNMENT

## 2025-03-11 DIAGNOSIS — M76.61 ACHILLES TENDINITIS OF RIGHT LOWER EXTREMITY: ICD-10-CM

## 2025-03-11 DIAGNOSIS — M54.2 PAIN, NECK: Primary | ICD-10-CM

## 2025-03-11 PROCEDURE — 97110 THERAPEUTIC EXERCISES: CPT | Performed by: PHYSICAL THERAPIST

## 2025-03-11 PROCEDURE — 97140 MANUAL THERAPY 1/> REGIONS: CPT | Performed by: PHYSICAL THERAPIST

## 2025-03-11 PROCEDURE — 97112 NEUROMUSCULAR REEDUCATION: CPT | Performed by: PHYSICAL THERAPIST

## 2025-03-11 NOTE — PROGRESS NOTES
Physical Therapy Daily Treatment Note      Visit #: 3    Colin Slater reports 5/10 pain today at rest.  Pt reports that his neck constantly feels tight and bothers him the most when trying to go to sleep. Pt reports that         Objective Pt present to PT today with no distress at rest.     Pt with pulling in the R UT with activities today.     Pt did well with postural correction activities today.     Pt with no increased pain in the R UT throughout session while performing activities to strengthen lower traps and other periscapular activities.       See Exercise, Manual, and Modality Logs for complete treatment.     Assessment/Plan  Pt continues to have rounded shoulders with pain in the scalenes and UT insertions at the base of the skull. Pt to continue with HEP and follow up next week as tolerated.       Progress per Plan of Care      Visit Diagnosis:    ICD-10-CM ICD-9-CM   1. Pain, neck  M54.2 723.1   2. Achilles tendinitis of right lower extremity  M76.61 726.71              Timed Treatment:  Manual Therapy:    15     mins  62367;  Therapeutic Exercise:    13     mins  68761;     Neuromuscular Kathya:    13    mins  88690;    Therapeutic Activity:          mins  87947;     Gait Training:           mins  01534;     Ultrasound:          mins  69160;    Electrical Stimulation:         mins  84020 ( );  Dry Needling          mins self-pay  Iontophoresis          mins 44698    Untimed Treatments:  Electrical Stimulation:         mins  82014 (MC );  Dry Needling:                     mins  Ultrasound:                         mins  24414;        Timed Treatment:   41   mins   Total Treatment:     41   mins    Jose James, PT  Physical Therapist

## 2025-03-25 ENCOUNTER — TELEPHONE (OUTPATIENT)
Dept: PHYSICAL THERAPY | Facility: CLINIC | Age: 35
End: 2025-03-25

## 2025-03-25 NOTE — TELEPHONE ENCOUNTER
Caller: Colin Slater    Relationship: Self       What was the call regarding: WORK SCHEDULE CHANGED

## 2025-04-01 ENCOUNTER — TREATMENT (OUTPATIENT)
Dept: PHYSICAL THERAPY | Facility: CLINIC | Age: 35
End: 2025-04-01
Payer: OTHER GOVERNMENT

## 2025-04-01 DIAGNOSIS — M76.61 ACHILLES TENDINITIS OF RIGHT LOWER EXTREMITY: Primary | ICD-10-CM

## 2025-04-01 PROCEDURE — 97140 MANUAL THERAPY 1/> REGIONS: CPT | Performed by: PHYSICAL THERAPIST

## 2025-04-01 PROCEDURE — 97110 THERAPEUTIC EXERCISES: CPT | Performed by: PHYSICAL THERAPIST

## 2025-04-01 PROCEDURE — 97112 NEUROMUSCULAR REEDUCATION: CPT | Performed by: PHYSICAL THERAPIST

## 2025-04-04 NOTE — PROGRESS NOTES
Physical Therapy Daily Treatment Note      Visit #: 13    Colin Slater reports 5-6/10 pain today at rest.  Pt reports that his body feels pretty run down and his achilles and ankle are bothering him a lot today. Pt states that he has been doing some new interventions with a medical group which he hopes will help his body recover and feel better.         Objective Pt present to PT today with no distress at rest.     Pt with less tightness noticeable in the R ankle with mobilization by PT today.     Pt with 0/10 pain in the R ankle and foot following activities today.       See Exercise, Manual, and Modality Logs for complete treatment.     Assessment/Plan  Pt continues to respond well to PT activities in the clinic to mobilize and strengthen the R ankle. Pt to continue with PT as tolerated.       Progress per Plan of Care      Visit Diagnosis:    ICD-10-CM ICD-9-CM   1. Achilles tendinitis of right lower extremity  M76.61 726.71              Timed Treatment:  Manual Therapy:    25     mins  32805;  Therapeutic Exercise:    15     mins  90677;     Neuromuscular Kathya:    15    mins  81115;    Therapeutic Activity:          mins  57410;     Gait Training:           mins  08861;     Ultrasound:          mins  04238;    Electrical Stimulation:         mins  26990 ( );  Dry Needling          mins self-pay  Iontophoresis          mins 72953    Untimed Treatments:  Electrical Stimulation:         mins  80030 ( );  Dry Needling:                     mins  Ultrasound:                         mins  66048;        Timed Treatment:   55   mins   Total Treatment:     55   mins    Jose James, PT  Physical Therapist

## 2025-04-10 ENCOUNTER — TELEPHONE (OUTPATIENT)
Dept: PHYSICAL THERAPY | Facility: OTHER | Age: 35
End: 2025-04-10
Payer: OTHER GOVERNMENT

## 2025-04-10 NOTE — TELEPHONE ENCOUNTER
Caller: Colin Slater    Relationship: Self    What was the call regarding: PATIENT CALLED TO LET US KNOW HE MISSED HIS APPT BECAUSE HE PUT IT IN HIS PHONE WRONG. HE IS VERY UPSET

## 2025-04-24 ENCOUNTER — TREATMENT (OUTPATIENT)
Dept: PHYSICAL THERAPY | Facility: CLINIC | Age: 35
End: 2025-04-24
Payer: OTHER GOVERNMENT

## 2025-04-24 ENCOUNTER — TRANSCRIBE ORDERS (OUTPATIENT)
Dept: PHYSICAL THERAPY | Facility: CLINIC | Age: 35
End: 2025-04-24

## 2025-04-24 DIAGNOSIS — M67.971 ACHILLES TENDON DISORDER, RIGHT: Primary | ICD-10-CM

## 2025-04-24 DIAGNOSIS — R25.2 EXERCISE-INDUCED CRAMPS OF LOWER EXTREMITY: ICD-10-CM

## 2025-04-24 DIAGNOSIS — M76.61 ACHILLES TENDINITIS OF RIGHT LOWER EXTREMITY: Primary | ICD-10-CM

## 2025-04-24 DIAGNOSIS — G57.51 TARSAL TUNNEL SYNDROME, RIGHT: ICD-10-CM

## 2025-04-24 PROCEDURE — 97140 MANUAL THERAPY 1/> REGIONS: CPT | Performed by: PHYSICAL THERAPIST

## 2025-04-24 PROCEDURE — 97112 NEUROMUSCULAR REEDUCATION: CPT | Performed by: PHYSICAL THERAPIST

## 2025-04-24 NOTE — PROGRESS NOTES
Physical Therapy Daily Treatment Note      Visit #: 14    Colin Slater reports that his R ankle and foot continue to have pain. Pt states that he has been to see his doctor again who suggests that he may have a nerve issue in the R foot.         Objective Pt present to PT today with no distress at rest.     Pt with tenderness through the tarsal tunnel on the R foot with tenderness extending above and below.     Pt with no increased pain with exercises today.     Pt with tenderness and discomfort with scraping through the R medial foot and ankle.       See Exercise, Manual, and Modality Logs for complete treatment.     Assessment/Plan  Pt continues to do well with activities to improve R ankle strength and stability. Pt to follow up next week and progress treatment as tolerated.       Progress per Plan of Care      Visit Diagnosis:    ICD-10-CM ICD-9-CM   1. Achilles tendinitis of right lower extremity  M76.61 726.71              Timed Treatment:  Manual Therapy:    15     mins  54313;  Therapeutic Exercise:         mins  39922;     Neuromuscular Kathya:    8    mins  36935;    Therapeutic Activity:          mins  92145;     Gait Training:           mins  73423;     Ultrasound:          mins  74303;    Electrical Stimulation:         mins  23324 ( );  Dry Needling          mins self-pay  Iontophoresis          mins 37577    Untimed Treatments:  Electrical Stimulation:         mins  98183 ( );  Dry Needling:                     mins  Ultrasound:                         mins  61024;        Timed Treatment:   23   mins   Total Treatment:     54   mins    Jose James, PT  Physical Therapist

## 2025-05-01 ENCOUNTER — TREATMENT (OUTPATIENT)
Dept: PHYSICAL THERAPY | Facility: CLINIC | Age: 35
End: 2025-05-01
Payer: OTHER GOVERNMENT

## 2025-05-01 DIAGNOSIS — M76.61 ACHILLES TENDINITIS OF RIGHT LOWER EXTREMITY: Primary | ICD-10-CM

## 2025-05-01 PROCEDURE — 97140 MANUAL THERAPY 1/> REGIONS: CPT | Performed by: PHYSICAL THERAPIST

## 2025-05-01 PROCEDURE — 97112 NEUROMUSCULAR REEDUCATION: CPT | Performed by: PHYSICAL THERAPIST

## 2025-05-01 NOTE — PROGRESS NOTES
Physical Therapy Daily Treatment Note      Visit #: 15    Colin Slater reports 2-3/10 pain today at rest.  Pt reports that he had nearly 4 days of relief following last session. Pt reports that the scraping of the tibialis posterior tendon seemed to help the tightness in the back of the leg as well.         Objective Pt present to PT today with no distress at rest.     Pt with some discomfort with R post tib scraping although relief after session.     Pt shown toe yoga to improve arch support in the foot.       See Exercise, Manual, and Modality Logs for complete treatment.     Assessment/Plan  Pt continues to have achilles and posterior tib pain with WB activities. Pt has improved since not working on his feet all the time and with added treatment with PT. Pt to continue with PT and activities at home to help improve R foot stability, activity tolerance, and strength.       Progress per Plan of Care      Visit Diagnosis:    ICD-10-CM ICD-9-CM   1. Achilles tendinitis of right lower extremity  M76.61 726.71              Timed Treatment:  Manual Therapy:    24     mins  00555;  Therapeutic Exercise:         mins  86280;     Neuromuscular Kathya:    14    mins  87666;    Therapeutic Activity:          mins  01571;     Gait Training:           mins  85836;     Ultrasound:          mins  77433;    Electrical Stimulation:         mins  82568 ( );  Dry Needling          mins self-pay  Iontophoresis          mins 62644    Untimed Treatments:  Electrical Stimulation:         mins  20581 (MC );  Dry Needling:                     mins  Ultrasound:                         mins  79926;        Timed Treatment:   38   mins   Total Treatment:     55   mins    Jose James, PT  Physical Therapist

## 2025-05-15 ENCOUNTER — TREATMENT (OUTPATIENT)
Dept: PHYSICAL THERAPY | Facility: CLINIC | Age: 35
End: 2025-05-15
Payer: OTHER GOVERNMENT

## 2025-05-15 DIAGNOSIS — M76.61 ACHILLES TENDINITIS OF RIGHT LOWER EXTREMITY: Primary | ICD-10-CM

## 2025-05-15 PROCEDURE — 97140 MANUAL THERAPY 1/> REGIONS: CPT | Performed by: PHYSICAL THERAPIST

## 2025-05-15 PROCEDURE — 97110 THERAPEUTIC EXERCISES: CPT | Performed by: PHYSICAL THERAPIST

## 2025-05-15 NOTE — PROGRESS NOTES
Physical Therapy Daily Treatment Note      Visit #: 16    Colin Slater reports 0/10 pain today at rest.  Pt reports that his R ankle has been much better with relief following activities last session.         Objective Pt present to PT today with no distress at rest.     Pt reports pain beneath the sesamoid bone in the R foot when doing toe yoga.     Pt reports grinding pain in the R foot during this activity which is different then his normal symptoms he has felt.     Pt pain improved following scraping over the tarsal tunnel and through the arch of the foot.       See Exercise, Manual, and Modality Logs for complete treatment.     Assessment/Plan  Pt continues to respond well to activities in the clinic and will continue with strengthening and stabilization of the R ankle and foot to improve pain free WB activities.       Progress per Plan of Care      Visit Diagnosis:    ICD-10-CM ICD-9-CM   1. Achilles tendinitis of right lower extremity  M76.61 726.71              Timed Treatment:  Manual Therapy:    30     mins  77795;  Therapeutic Exercise:     8    mins  22961;     Neuromuscular Kathya:        mins  93332;    Therapeutic Activity:          mins  60346;     Gait Training:           mins  50414;     Ultrasound:          mins  88135;    Electrical Stimulation:         mins  73297 ( );  Dry Needling          mins self-pay  Iontophoresis          mins 54266    Untimed Treatments:  Electrical Stimulation:         mins  51833 (MC );  Dry Needling:                     mins  Ultrasound:                         mins  50864;        Timed Treatment:   38   mins   Total Treatment:     60   mins    Jose James, PT  Physical Therapist

## 2025-05-30 ENCOUNTER — TREATMENT (OUTPATIENT)
Dept: PHYSICAL THERAPY | Facility: CLINIC | Age: 35
End: 2025-05-30
Payer: OTHER GOVERNMENT

## 2025-05-30 DIAGNOSIS — M76.61 ACHILLES TENDINITIS OF RIGHT LOWER EXTREMITY: Primary | ICD-10-CM

## 2025-05-30 PROCEDURE — 97112 NEUROMUSCULAR REEDUCATION: CPT | Performed by: PHYSICAL THERAPIST

## 2025-05-30 PROCEDURE — 97140 MANUAL THERAPY 1/> REGIONS: CPT | Performed by: PHYSICAL THERAPIST

## 2025-05-30 NOTE — PROGRESS NOTES
Physical Therapy Daily Treatment Note      Visit #: 17    Colin Slater reports 0/10 pain today at rest.  Pt reports that he has been on prednisone for some sickness and it has helped the ankle a lot. Pt reports that he has been doing his foot isometrics prior to sleeping which has seemed to help the pain ease at night.         Objective Pt present to PT today with no distress at rest.     Pt with no increased pain with exercises today.     Pt with tenderness in the L posterior tibialis tendon with scraping today.       See Exercise, Manual, and Modality Logs for complete treatment.     Assessment/Plan  Pt continues to present with posterior tibialis tendonitis causing pain, decreased function, and decreased activity tolerance. Pt to continue with activities to improve ankle and foot stability with WB activities as tolerated.       Progress per Plan of Care      Visit Diagnosis:    ICD-10-CM ICD-9-CM   1. Achilles tendinitis of right lower extremity  M76.61 726.71              Timed Treatment:  Manual Therapy:    14     mins  72151;  Therapeutic Exercise:         mins  63740;     Neuromuscular Kathya:    9    mins  76090;    Therapeutic Activity:          mins  96061;     Gait Training:           mins  84371;     Ultrasound:          mins  21531;    Electrical Stimulation:         mins  62931 ( );  Dry Needling          mins self-pay  Iontophoresis          mins 35623    Untimed Treatments:  Electrical Stimulation:         mins  78986 (MC );  Dry Needling:                     mins  Ultrasound:                         mins  37388;        Timed Treatment:   23   mins   Total Treatment:     57   mins    Jose James, PT  Physical Therapist

## 2025-06-12 ENCOUNTER — TELEPHONE (OUTPATIENT)
Dept: PHYSICAL THERAPY | Facility: CLINIC | Age: 35
End: 2025-06-12

## 2025-06-12 NOTE — TELEPHONE ENCOUNTER
Caller: Colin Slater    Relationship: Self       What was the call regarding: NS DID NOT REALIZE HE HAD APPT, COMING IN TOMORROW

## 2025-06-13 ENCOUNTER — TREATMENT (OUTPATIENT)
Dept: PHYSICAL THERAPY | Facility: CLINIC | Age: 35
End: 2025-06-13
Payer: OTHER GOVERNMENT

## 2025-06-13 DIAGNOSIS — M76.61 ACHILLES TENDINITIS OF RIGHT LOWER EXTREMITY: Primary | ICD-10-CM

## 2025-06-13 PROCEDURE — 97112 NEUROMUSCULAR REEDUCATION: CPT | Performed by: PHYSICAL THERAPIST

## 2025-06-13 PROCEDURE — 97140 MANUAL THERAPY 1/> REGIONS: CPT | Performed by: PHYSICAL THERAPIST

## 2025-06-13 NOTE — PROGRESS NOTES
Physical Therapy Daily Treatment Note      Visit #: 18    Colin Slater reports 0/10 pain today at rest.  Pt reports that his ankle seems to benefit from some training he has been doing with cristino. Pt states that he felt good following last session.         Objective Pt present to PT today with no distress at rest.     Pt with no increased pain in the R ankle or foot with activities today.     Pt with no discomfort with ecc heel raises today for the first time.       See Exercise, Manual, and Modality Logs for complete treatment.     Assessment/Plan  Pt continues to respond well to activities in the clinic to improve R ankle and foot pain. Pt to continue with stretching and stabilization activities for R ankle and foot to improve support with WB activities such as running.      Progress per Plan of Care      Visit Diagnosis:    ICD-10-CM ICD-9-CM   1. Achilles tendinitis of right lower extremity  M76.61 726.71              Timed Treatment:  Manual Therapy:    15     mins  43167;  Therapeutic Exercise:         mins  36035;     Neuromuscular Kathya:    8    mins  02483;    Therapeutic Activity:          mins  87609;     Gait Training:           mins  68730;     Ultrasound:          mins  06439;    Electrical Stimulation:         mins  10757 ( );  Dry Needling          mins self-pay  Iontophoresis          mins 55052    Untimed Treatments:  Electrical Stimulation:         mins  00418 ( );  Dry Needling:                     mins  Ultrasound:                         mins  12391;        Timed Treatment:   23   mins   Total Treatment:     50   mins    Jose James, PT  Physical Therapist

## 2025-06-26 ENCOUNTER — TREATMENT (OUTPATIENT)
Dept: PHYSICAL THERAPY | Facility: CLINIC | Age: 35
End: 2025-06-26
Payer: OTHER GOVERNMENT

## 2025-06-26 DIAGNOSIS — M76.61 ACHILLES TENDINITIS OF RIGHT LOWER EXTREMITY: Primary | ICD-10-CM

## 2025-06-26 PROCEDURE — 97140 MANUAL THERAPY 1/> REGIONS: CPT | Performed by: PHYSICAL THERAPIST

## 2025-06-26 PROCEDURE — 97112 NEUROMUSCULAR REEDUCATION: CPT | Performed by: PHYSICAL THERAPIST

## 2025-06-26 NOTE — PROGRESS NOTES
Physical Therapy Daily Treatment Note      Visit #: 19    Colin Slater reports no new issues or flare ups in the R ankle or foot.         Objective Pt present to PT today with no distress at rest.     Pt with tenderness through the medial ankle into the arch of the R foot and heel as well.     Pt with no increased pain following exercises for ankle stabilization as well as arch support.       See Exercise, Manual, and Modality Logs for complete treatment.     Assessment/Plan  Pt continues to have symptoms that increased in the R foot and ankle with WB and pounding activities. Pt to continue with PT next available appointment.       Progress per Plan of Care      Visit Diagnosis:    ICD-10-CM ICD-9-CM   1. Achilles tendinitis of right lower extremity  M76.61 726.71              Timed Treatment:  Manual Therapy:    25     mins  11123;  Therapeutic Exercise:         mins  53270;     Neuromuscular Kathya:    13    mins  61390;    Therapeutic Activity:          mins  34681;     Gait Training:           mins  90464;     Ultrasound:          mins  72306;    Electrical Stimulation:         mins  62840 (MC );  Dry Needling          mins self-pay  Iontophoresis          mins 81221    Untimed Treatments:  Electrical Stimulation:         mins  66556 (MC );  Dry Needling:                     mins  Ultrasound:                         mins  66603;        Timed Treatment:   38   mins   Total Treatment:     55   mins    Jose James, PT  Physical Therapist

## 2025-07-09 NOTE — PROGRESS NOTES
Chief Complaint / Reason:      Chief Complaint   Patient presents with   • Generalized Body Aches     everywhere x 1 day   • Fatigue   • Sore Throat       Subjective     HPI  Patient presents today with complaints of generalized body aches and he states symptoms have been going on for at least a day and he has been more fatigued than normal and does have sore throat.  He also has been having fever and chills but denies chest pain, shortness of breath or heart palpitations.  He previously had appendectomy but denies any complaints or any abnormal drainage or odor or abdominal pain with nausea.  Patient is concerned regarding work as he does not want to lose his job because he will lose everything but he cannot work feeling this way.  History taken from: patient    PMH/FH/Social History were reviewed and updated appropriately in the electronic medical record.   Past Medical History:   Diagnosis Date   • Acute appendicitis 8/24/2020   • ADD (attention deficit disorder)    • ADHD (attention deficit hyperactivity disorder)    • Asthma    • Depression    • Moderate single current episode of major depressive disorder (CMS/[facility]) 4/24/2017     Past Surgical History:   Procedure Laterality Date   • APPENDECTOMY N/A 8/24/2020    Procedure: APPENDECTOMY LAPAROSCOPIC;  Surgeon: Indy Mcdaniel MD;  Location: [facility];  Service: General;  Laterality: N/A;   • EAR TUBES     • MYRINGOTOMY W/ TUBES     • NASAL SEPTUM SURGERY     • SEPTOPLASTY     • WISDOM TOOTH EXTRACTION     • WISDOM TOOTH EXTRACTION       Social History     Socioeconomic History   • Marital status: Single     Spouse name: Not on file   • Number of children: Not on file   • Years of education: Not on file   • Highest education level: Not on file   Tobacco Use   • Smoking status: Never Smoker   • Smokeless tobacco: Never Used   Substance and Sexual Activity   • Alcohol use: Yes     Alcohol/week: 12.0 standard drinks     Types: 6 Cans of beer, 6 Shots of liquor  Referral to pain management sent today.  Flexeril sent to pharmacy.  Continue seeing Dr. Beasley for hand.   Follow up in 12 weeks.    per week     Comment: rarely   • Drug use: No   • Sexual activity: Yes     Partners: Female     Family History   Problem Relation Age of Onset   • Diabetes Mother    • Diabetes Father    • Heart disease Father    • Hyperlipidemia Father    • Hypertension Father    • Thyroid disease Sister    • Early death Maternal Grandfather    • Lung cancer Paternal Grandmother    • Cancer Paternal Grandmother    • Heart disease Paternal Grandmother    • Hyperlipidemia Paternal Grandmother    • No Known Problems Paternal Grandfather        Review of Systems:   Review of Systems   Constitutional: Positive for chills, fatigue and fever.   HENT: Positive for sore throat.    Respiratory: Negative.    Cardiovascular: Negative.    Allergic/Immunologic: Positive for environmental allergies.   Neurological: Negative.    Psychiatric/Behavioral: Positive for sleep disturbance and stress. The patient is nervous/anxious.          All other systems were reviewed and are negative.  Exceptions are noted in the subjective or above.      Objective     Vital Signs  Vitals:    10/01/20 1639   BP: 158/55   Pulse: 116   Resp: 15   Temp: (!) 101.6 °F (38.7 °C)   SpO2: 100%       Body mass index is 29.91 kg/m².    Physical Exam  Vitals signs and nursing note reviewed.   Constitutional:       General: He is not in acute distress.     Appearance: He is well-developed.   HENT:      Head: Normocephalic and atraumatic.      Right Ear: Ear canal and external ear normal. Tympanic membrane is erythematous and bulging.      Left Ear: Ear canal and external ear normal. Tympanic membrane is erythematous and bulging.      Nose: Mucosal edema and rhinorrhea (clear nasal secretions) present.      Right Sinus: No maxillary sinus tenderness or frontal sinus tenderness.      Left Sinus: No maxillary sinus tenderness or frontal sinus tenderness.      Mouth/Throat:      Mouth: Mucous membranes are dry.      Pharynx: Posterior oropharyngeal erythema present.   Eyes:       Conjunctiva/sclera: Conjunctivae normal.   Cardiovascular:      Rate and Rhythm: Regular rhythm. Tachycardia present.      Heart sounds: Normal heart sounds.   Pulmonary:      Effort: Pulmonary effort is normal. No respiratory distress.      Breath sounds: Normal breath sounds.   Lymphadenopathy:      Head:      Right side of head: Tonsillar adenopathy present. No submental or submandibular adenopathy.      Left side of head: Tonsillar adenopathy present. No submental or submandibular adenopathy.      Cervical: No cervical adenopathy.   Skin:     General: Skin is warm and dry.      Capillary Refill: Capillary refill takes less than 2 seconds.      Findings: No rash.   Neurological:      Mental Status: He is alert and oriented to person, place, and time.   Psychiatric:         Mood and Affect: Mood is anxious.         Behavior: Behavior normal.         Thought Content: Thought content normal.         Judgment: Judgment normal.              Results Review:    I reviewed the patient's new clinical results.     Office Visit on 10/01/2020   Component Date Value Ref Range Status   • Monospot 10/01/2020 Negative  Negative Final   • Internal Control 10/01/2020 Passed  Passed Final   • Lot Number 10/01/2020 [id number]   Final   • Expiration Date 10/01/2020 02/28/2021   Final       Medication Review:     Current Outpatient Medications:   •  lisdexamfetamine (Vyvanse) 20 MG capsule, Take 20 mg by mouth Every Morning, Disp: , Rfl:     Assessment/Plan   Colin was seen today for generalized body aches, fatigue and sore throat.    Diagnoses and all orders for this visit:    Acute pharyngitis, unspecified etiology  -     POCT Infectious mononucleosis antibody  -     amoxicillin-clavulanate (Augmentin) 875-125 MG per tablet; Take 1 tablet by mouth 2 (Two) Times a Day for 10 days.  Discussed nonpharmacological interventions with patient recommend good oral hygiene and handwashing advised patient to avoid biting nails.  Recommend  changing out toothbrush 3 days after starting antibiotic and again before antibiotics are finished.  Fever, unspecified fever cause  -     POCT Infectious mononucleosis antibody  May need labs if fever persist to assess WBC and advised patient to follow-up with surgeon if anything changes from appendectomy site  Elevated blood pressure reading  Recommend close monitor blood pressure at home and contact office if blood pressure remains elevated      Return if symptoms worsen or fail to improve.    Allyson Gamez, APRN  10/01/2020

## 2025-07-16 NOTE — PROGRESS NOTES
Sleep Clinic Follow Up Note    Chief Complaint  Sleeping Problem, Sleep Apnea, and Follow-up (Follow up)    Subjective     History of Present Illness (from previous encounter on 7/12/2024):  Colin Slater is a 34 y.o. male who returns for follow-up and compliance of PAP therapy.  The Pap report has been reviewed.  Overall usage is at 76% with compliance and 58%.  I have encouraged increase usage.  Patient averages 4 hours and 56 minutes of therapy.  Sleep apnea is controlled with an AHI of 4.9/H. He was out of town for 2 weeks and wasn't able to use the device. I will refill the patient's supplies, and I have asked them to return for follow-up and compliance in 1 year or sooner should they have further questions or concerns. I will increase the EPR to 3 as he is having difficulty with exhalation. He is remembering his dreams and is concerned about night terrors. He has PTSD and is followed by psychiatry. He had multiple head injuries and has had difficulty with sensory issues, referred pain, and memory loss. He has been unable to remember faces and names.He was to see  neurology but was canceled as the provider left. I will refer him to neurology for further evaluation.     (End copied text).    Interval History:  Colin Slater is a 35 y.o. male returns for follow up and compliance of PAP therapy. The patient was last seen on 7/12/2024 by me. Overall the patient feels poor with regard to therapy. He feels as though he cannot exhale. The device appears to be working appropriately. On average the patient sleeps 6-8 hours per night. The patient wakes 1-2 times per night. He has a history of asthma child and now has what he describes as exercise-induced asthma.    The patient reports the following changes to their medical and medication history since they were last seen:  None    Further details are as follows:      [place] Scale is (out of 24): Total score: 7     Weight:  Current Weight: 208 lb      The  "patient's relevant past medical, surgical, family, and social history reviewed and updated in Epic as appropriate.    PMH:    Past Medical History:   Diagnosis Date    Acute appendicitis 8/24/2020    ADD (attention deficit disorder)     ADHD (attention deficit hyperactivity disorder)     Asthma     Depression     Moderate single current episode of major depressive disorder 4/24/2017     Past Surgical History:   Procedure Laterality Date    APPENDECTOMY N/A 08/24/2020    Procedure: APPENDECTOMY LAPAROSCOPIC;  Surgeon: Indy Mcdaniel MD;  Location: [facility];  Service: General;  Laterality: N/A;    EAR TUBES      MYRINGOTOMY W/ TUBES      NASAL SEPTUM SURGERY      NOSE SURGERY  2023    SEPTOPLASTY      WISDOM TOOTH EXTRACTION      WISDOM TOOTH EXTRACTION         Allergies   Allergen Reactions    Hydrocodone Hallucinations     Patient states that he had \"mild hallucinations\" with this medication    Apple Juice Other (See Comments)     Diarrhea/constipation    Banana Other (See Comments)     Diarrhea/constipation       MEDS:  Prior to Admission medications    Medication Sig Start Date End Date Taking? Authorizing Provider   fexofenadine (Allegra Allergy) 60 MG tablet Take 1 tablet by mouth Daily. 5/3/24   Dayna Espinoza APRN   fluticasone (FLONASE) 50 MCG/ACT nasal spray 2 sprays into the nostril(s) as directed by provider Daily. 5/2/24   Flor Lindo APRN   Scopolamine 1 MG/3DAYS patch Place 1 patch on the skin as directed by provider Every 72 (Seventy-Two) Hours. 8/9/24   Mirna Santo PA-C   Vyvanse 20 MG capsule  5/24/22   Provider, MD Lety         FH:  Family History   Problem Relation Age of Onset    Diabetes Mother     Diabetes Father     Heart disease Father     Hyperlipidemia Father     Hypertension Father     Thyroid disease Sister     Early death Maternal Grandfather     Lung cancer Paternal Grandmother     Cancer Paternal Grandmother     Heart disease Paternal Grandmother     " "Hyperlipidemia Paternal Grandmother     No Known Problems Paternal Grandfather        Objective   Vital Signs:  /70 (BP Location: Left arm, Patient Position: Sitting, Cuff Size: Adult)   Pulse 78   Temp 97.7 °F (36.5 °C) (Temporal)   Ht 167.6 cm (65.98\")   Wt 94.7 kg (208 lb 12.8 oz)   SpO2 97%   BMI 33.72 kg/m²     Patient's (Body mass index is 33.72 kg/m².) indicates that they are obese (BMI >30)           Physical Exam  Vitals reviewed.   Constitutional:       Appearance: Normal appearance.   HENT:      Head: Normocephalic and atraumatic.      Nose: Nose normal.      Mouth/Throat:      Mouth: Mucous membranes are moist.   Cardiovascular:      Rate and Rhythm: Normal rate and regular rhythm.      Heart sounds: No murmur heard.     No friction rub. No gallop.   Pulmonary:      Effort: Pulmonary effort is normal. No respiratory distress.      Breath sounds: Normal breath sounds. No wheezing or rhonchi.   Neurological:      Mental Status: He is alert and oriented to person, place, and time.   Psychiatric:         Behavior: Behavior normal.               Result Review :           PAP Report:  AHI: 1.2/h  Days of Usage: 28/30 (93%)  Number of Days Greater than 4 hours: 50%  Average time (days used): 3 hours 57 minutes  95th Percentile Pressure: 10 cmH2O  95th percentile leaks: 7.3 L/min  Settings: Auto CPAP-8/15 cm H2O, EPR full-time, EPR level 3, response standard.       Assessment and Plan  Colin Slater is a 35 y.o. male who returns for follow-up and compliance of PAP therapy.  The Pap report has been reviewed.  Overall usage is at 93% compliance of 50%.  Patient averaged 3 hours and 57 minutes of therapy.  Sleep apnea is well-controlled in nature at 1.2/H. He had difficulty getting supplies which is reflective in his data.     I will refill the patient's supplies, and I have asked him to return for follow-up and recheck in approximately 4 months.  If the patient continues to have difficulty with " exhalation pressure then we may need to consider titration study for BiPAP.    Diagnoses and all orders for this visit:    1. Obstructive sleep apnea, adult (Primary)  -     PAP Therapy    2. Obesity (BMI 30.0-34.9)             The patient continues to use and benefit from PAP therapy.    1. The patient was counseled regarding multimodal approach with healthy nutrition, healthy sleep, regular physical activity, social activities, counseling, and medications. Encouraged to practice lateral sleep position. Avoid alcohol and sedatives close to bedtime.     2.  We will refill supplies x1 year.           Follow Up  Return in about 4 months (around 11/18/2025) for Recheck.  Patient was given instructions and counseling regarding his condition or for health maintenance advice. Please see specific information pulled into the AVS if appropriate.       DEUCE Cosme, ACNP-BC  Pulmonology, Critical Care, and Sleep Medicine

## 2025-07-18 ENCOUNTER — OFFICE VISIT (OUTPATIENT)
Dept: SLEEP MEDICINE | Age: 35
End: 2025-07-18
Payer: OTHER GOVERNMENT

## 2025-07-18 VITALS
WEIGHT: 208.8 LBS | SYSTOLIC BLOOD PRESSURE: 132 MMHG | HEIGHT: 66 IN | TEMPERATURE: 97.7 F | HEART RATE: 78 BPM | DIASTOLIC BLOOD PRESSURE: 70 MMHG | BODY MASS INDEX: 33.56 KG/M2 | OXYGEN SATURATION: 97 %

## 2025-07-18 DIAGNOSIS — E66.811 OBESITY (BMI 30.0-34.9): ICD-10-CM

## 2025-07-18 DIAGNOSIS — G47.33 OBSTRUCTIVE SLEEP APNEA, ADULT: Primary | ICD-10-CM

## 2025-07-31 ENCOUNTER — TELEPHONE (OUTPATIENT)
Dept: PHYSICAL THERAPY | Facility: OTHER | Age: 35
End: 2025-07-31

## 2025-08-03 ENCOUNTER — PATIENT MESSAGE (OUTPATIENT)
Dept: SLEEP MEDICINE | Age: 35
End: 2025-08-03
Payer: OTHER GOVERNMENT

## 2025-08-04 DIAGNOSIS — G47.33 OBSTRUCTIVE SLEEP APNEA, ADULT: Primary | ICD-10-CM

## 2025-08-15 DIAGNOSIS — G47.33 OBSTRUCTIVE SLEEP APNEA, ADULT: Primary | ICD-10-CM

## (undated) DEVICE — ENDOPATH XCEL BLADELESS TROCARS WITH STABILITY SLEEVES: Brand: ENDOPATH XCEL

## (undated) DEVICE — SPNG GZ STRL 2S 4X4 12PLY

## (undated) DEVICE — ENDOPATH ETS-FLEX45 ARTICULATING ENDOSCOPIC LINEAR CUTTER, NO RELOAD: Brand: ENDOPATH

## (undated) DEVICE — TOTAL TRAY, 16FR 10ML SIL FOLEY, URN: Brand: MEDLINE

## (undated) DEVICE — UNDYED BRAIDED (POLYGLACTIN 910), SYNTHETIC ABSORBABLE SUTURE: Brand: COATED VICRYL

## (undated) DEVICE — RICH GENERAL LAPAROSCOPY: Brand: MEDLINE INDUSTRIES, INC.

## (undated) DEVICE — BNDG ADHS PLSTC 1X3IN LF

## (undated) DEVICE — SOL NACL 0.9PCT 1000ML

## (undated) DEVICE — SUT PDS 0 CT2 27IN DYED Z334H

## (undated) DEVICE — ADHS LIQ MASTISOL 2/3ML

## (undated) DEVICE — ENDOPATH XCEL UNIVERSAL TROCAR STABLILITY SLEEVES: Brand: ENDOPATH XCEL

## (undated) DEVICE — ENDOPATH XCEL BLUNT TIP TROCARS WITH SMOOTH SLEEVES: Brand: ENDOPATH XCEL

## (undated) DEVICE — DRSNG SURESITE WNDW 4X4.5

## (undated) DEVICE — CUFF SCD HEMOFORCE SEQ CALF STD MD

## (undated) DEVICE — GLV SURG SENSICARE GREEN W/ALOE PF LF 6 STRL

## (undated) DEVICE — SYR LL TP 10ML STRL

## (undated) DEVICE — NDL HYPO ECLPS SFTY 22G 1 1/2IN

## (undated) DEVICE — SUT VIC 0/0 UR6 27IN DYED J603H

## (undated) DEVICE — GLV SURG ULTRATOUCH BIOGEL/COAT PF LF SZ6 STRL